# Patient Record
Sex: MALE | Race: ASIAN | NOT HISPANIC OR LATINO | Employment: FULL TIME | URBAN - METROPOLITAN AREA
[De-identification: names, ages, dates, MRNs, and addresses within clinical notes are randomized per-mention and may not be internally consistent; named-entity substitution may affect disease eponyms.]

---

## 2017-01-26 ENCOUNTER — TELEPHONE (OUTPATIENT)
Dept: FAMILY MEDICINE | Facility: CLINIC | Age: 27
End: 2017-01-26

## 2017-01-26 NOTE — TELEPHONE ENCOUNTER
Mother Lucila is calling, groin pain, seen at ED last October, and had phone follow up with Huey. Has symptoms again, can he get another prescription for an antibiotic?  Same area as last time. NKDA. Leaving town tomorrow, and is not able to come in.  Huddled with provider, he is recommending he make an appointment or go to urgent care if scheduling problem. Advised to take an  Anti-inflammatory to help with pain. Mom is having a hard time getting patient to agree to an appointment.   There are openings on schedule for tomorrow if he decides.   Mom will try and get him to urgent care this evening.   Jaymie Herrera, MELVI  Triage Nurse

## 2017-02-21 ENCOUNTER — OFFICE VISIT (OUTPATIENT)
Dept: FAMILY MEDICINE | Facility: CLINIC | Age: 27
End: 2017-02-21
Payer: COMMERCIAL

## 2017-02-21 VITALS
HEIGHT: 68 IN | HEART RATE: 95 BPM | WEIGHT: 292 LBS | TEMPERATURE: 97.9 F | BODY MASS INDEX: 44.25 KG/M2 | OXYGEN SATURATION: 96 % | SYSTOLIC BLOOD PRESSURE: 144 MMHG | DIASTOLIC BLOOD PRESSURE: 94 MMHG

## 2017-02-21 DIAGNOSIS — G47.33 OSA (OBSTRUCTIVE SLEEP APNEA): ICD-10-CM

## 2017-02-21 DIAGNOSIS — S83.206A TEAR OF MENISCUS OF RIGHT KNEE AS CURRENT INJURY, UNSPECIFIED MENISCUS, UNSPECIFIED TEAR TYPE, INITIAL ENCOUNTER: ICD-10-CM

## 2017-02-21 DIAGNOSIS — E66.01 MORBID OBESITY DUE TO EXCESS CALORIES (H): ICD-10-CM

## 2017-02-21 DIAGNOSIS — I10 ESSENTIAL HYPERTENSION WITH GOAL BLOOD PRESSURE LESS THAN 140/90: ICD-10-CM

## 2017-02-21 DIAGNOSIS — S89.91XA RIGHT KNEE INJURY, INITIAL ENCOUNTER: ICD-10-CM

## 2017-02-21 DIAGNOSIS — Z01.818 PREOP GENERAL PHYSICAL EXAM: Primary | ICD-10-CM

## 2017-02-21 LAB — HGB BLD-MCNC: 15.1 G/DL (ref 13.3–17.7)

## 2017-02-21 PROCEDURE — 99214 OFFICE O/P EST MOD 30 MIN: CPT | Performed by: PHYSICIAN ASSISTANT

## 2017-02-21 PROCEDURE — 80048 BASIC METABOLIC PNL TOTAL CA: CPT | Performed by: PHYSICIAN ASSISTANT

## 2017-02-21 PROCEDURE — 85018 HEMOGLOBIN: CPT | Performed by: PHYSICIAN ASSISTANT

## 2017-02-21 PROCEDURE — 36415 COLL VENOUS BLD VENIPUNCTURE: CPT | Performed by: PHYSICIAN ASSISTANT

## 2017-02-21 RX ORDER — LISINOPRIL AND HYDROCHLOROTHIAZIDE 20; 25 MG/1; MG/1
1 TABLET ORAL DAILY
Qty: 90 TABLET | Refills: 0 | Status: SHIPPED | OUTPATIENT
Start: 2017-02-21 | End: 2017-03-09

## 2017-02-21 NOTE — MR AVS SNAPSHOT
After Visit Summary   2/21/2017    Cristiano Casanova    MRN: [id number]           Patient Information     Date Of Birth          1990        Visit Information        Provider Department      2/21/2017 6:00 PM Kiel Georges PA-C [place]        Today's Diagnoses     Preop general physical exam    -  1    Right knee injury, initial encounter        Tear of meniscus of right knee as current injury, unspecified meniscus, unspecified tear type, initial encounter        Essential hypertension with goal blood pressure less than 140/90        MERRICK (obstructive sleep apnea)          Care Instructions      Before Your Surgery      Call your surgeon if there is any change in your health. This includes signs of a cold or flu (such as a sore throat, runny nose, cough, rash or fever).    Do not smoke, drink alcohol or take over the counter medicine (unless your surgeon or primary care doctor tells you to) for the 24 hours before and after surgery.    If you take prescribed drugs: Follow your doctor s orders about which medicines to take and which to stop until after surgery.    Eating and drinking prior to surgery: follow the instructions from your surgeon    Take a shower or bath the night before surgery. Use the soap your surgeon gave you to gently clean your skin. If you do not have soap from your surgeon, use your regular soap. Do not shave or scrub the surgery site.  Wear clean pajamas and have clean sheets on your bed.         Follow-ups after your visit        Your next 10 appointments already scheduled     Mar 01, 2017   Procedure with Abdelrahman Cui MD   [facility] (--)    [address] [facility] [address] [phone]              Who to contact     If you have questions or need follow up information about today's clinic visit or your schedule please contact [facility] directly at [phone].  Normal or non-critical  "lab and imaging results will be communicated to you by [employer], letter or phone within 4 business days after the clinic has received the results. If you do not hear from us within 7 days, please contact the clinic through [employer] or phone. If you have a critical or abnormal lab result, we will notify you by phone as soon as possible.  Submit refill requests through [employer] or call your pharmacy and they will forward the refill request to us. Please allow 3 business days for your refill to be completed.          Additional Information About Your Visit        [employer] Information     [employer] gives you secure access to your electronic health record. If you see a primary care provider, you can also send messages to your care team and make appointments. If you have questions, please call your primary care clinic.  If you do not have a primary care provider, please call [phone] and they will assist you.        Care EveryWhere ID     This is your Care EveryWhere ID. This could be used by other organizations to access your [facility] medical records  [id number]        Your Vitals Were     Pulse Temperature Height Pulse Oximetry BMI (Body Mass Index)       95 97.9  F (36.6  C) (Oral) 5' 8\" (1.727 m) 96% 44.4 kg/m2        Blood Pressure from Last 3 Encounters:   02/21/17 (!) 144/94   12/15/16 134/82   10/15/16 125/69    Weight from Last 3 Encounters:   02/21/17 292 lb (132.5 kg)   12/15/16 284 lb (128.8 kg)   08/16/16 272 lb 11.2 oz (123.7 kg)              We Performed the Following     Basic metabolic panel     Hemoglobin          Today's Medication Changes          These changes are accurate as of: 2/21/17  6:21 PM.  If you have any questions, ask your nurse or doctor.               Start taking these medicines.        Dose/Directions    lisinopril-hydrochlorothiazide 20-25 MG per tablet   Commonly known as:  PRINZIDE/ZESTORETIC   Used for:  Essential hypertension with goal blood pressure less than 140/90, Preop general " physical exam   Replaces:  lisinopril-hydrochlorothiazide 20-12.5 MG per tablet   Started by:  Kiel Georges PA-C        Dose:  1 tablet   Take 1 tablet by mouth daily   Quantity:  90 tablet   Refills:  0         Stop taking these medicines if you haven't already. Please contact your care team if you have questions.     lisinopril-hydrochlorothiazide 20-12.5 MG per tablet   Commonly known as:  PRINZIDE/ZESTORETIC   Replaced by:  lisinopril-hydrochlorothiazide 20-25 MG per tablet   Stopped by:  Kiel Georges PA-C                Where to get your medicines      [facility] [address] [facility] [address] AT [place] [address] & [facility]  [address] [facility] [address]     Phone:  [phone]     lisinopril-hydrochlorothiazide 20-25 MG per tablet                Primary Care Provider Office Phone # Fax #    Kiel Georges PA-C [phone]       [facility] [address] [facility] [address]        Thank you!     Thank you for choosing [facility]  for your care. Our goal is always to provide you with excellent care. Hearing back from our patients is one way we can continue to improve our services. Please take a few minutes to complete the written survey that you may receive in the mail after your visit with us. Thank you!             Your Updated Medication List - Protect others around you: Learn how to safely use, store and throw away your medicines at www.disposemymeds.org.          This list is accurate as of: 2/21/17  6:21 PM.  Always use your most recent med list.                   Brand Name Dispense Instructions for use    lisinopril-hydrochlorothiazide 20-25 MG per tablet    PRINZIDE/ZESTORETIC    90 tablet    Take 1 tablet by mouth daily

## 2017-02-22 LAB
ANION GAP SERPL CALCULATED.3IONS-SCNC: 6 MMOL/L (ref 3–14)
BUN SERPL-MCNC: 11 MG/DL (ref 7–30)
CALCIUM SERPL-MCNC: 9.1 MG/DL (ref 8.5–10.1)
CHLORIDE SERPL-SCNC: 105 MMOL/L (ref 94–109)
CO2 SERPL-SCNC: 29 MMOL/L (ref 20–32)
CREAT SERPL-MCNC: 0.72 MG/DL (ref 0.66–1.25)
GFR SERPL CREATININE-BSD FRML MDRD: ABNORMAL ML/MIN/1.7M2
GLUCOSE SERPL-MCNC: 101 MG/DL (ref 70–99)
POTASSIUM SERPL-SCNC: 4.2 MMOL/L (ref 3.4–5.3)
SODIUM SERPL-SCNC: 140 MMOL/L (ref 133–144)

## 2017-02-22 NOTE — NURSING NOTE
"Chief Complaint   Patient presents with     Pre-Op Exam       Initial /88  Pulse 95  Temp 97.9  F (36.6  C) (Oral)  Ht 5' 8\" (1.727 m)  Wt 292 lb (132.5 kg)  SpO2 96%  BMI 44.4 kg/m2 Estimated body mass index is 44.4 kg/(m^2) as calculated from the following:    Height as of this encounter: 5' 8\" (1.727 m).    Weight as of this encounter: 292 lb (132.5 kg).  Medication Reconciliation: complete   Kd Burks CMA        "

## 2017-02-22 NOTE — PROGRESS NOTES
[facility]  [address] [facility] [address] [phone]  Dept: [phone]    PRE-OP EVALUATION:  Today's date: 2017    Cristiano Casanova (: 1990) presents for pre-operative evaluation assessment as requested by Dr. Cui.  He requires evaluation and anesthesia risk assessment prior to undergoing surgery/procedure for treatment of right knee .  Proposed procedure: Arthroscopy of Right Knee    Date of Surgery/ Procedure: 3/1/17  Time of Surgery/ Procedure: 8:55am  Hospital/Surgical Facility: [facility]    Primary Physician: Kiel Georges  Type of Anesthesia Anticipated: to be determined    Patient has a Health Care Directive or Living Will:  NO    1. NO - Do you have a history of heart attack, stroke, stent, bypass or surgery on an artery in the head, neck, heart or legs?  2. NO - Do you ever have any pain or discomfort in your chest?  3. NO - Do you have a history of  Heart Failure?  4. NO - Are you troubled by shortness of breath when: walking on the level, up a slight hill or at night?  5. NO - Do you currently have a cold, bronchitis or other respiratory infection?  6. NO - Do you have a cough, shortness of breath or wheezing?  7. NO - Do you sometimes get pains in the calves of your legs when you walk?  8. NO - Do you or anyone in your family have previous history of blood clots?  9. NO - Do you or does anyone in your family have a serious bleeding problem such as prolonged bleeding following surgeries or cuts?  10. NO - Have you ever had problems with anemia or been told to take iron pills?  11. NO - Have you had any abnormal blood loss such as black, tarry or bloody stools, or abnormal vaginal bleeding?  12. NO - Have you ever had a blood transfusion?  13. NO - Have you or any of your relatives ever had problems with anesthesia?  14. YES - DO YOU HAVE SLEEP APNEA, EXCESSIVE SNORING OR DAYTIME DROWSINESS? Has a dx of MERRICK, does not have cpap  15. NO -  Do you have any prosthetic heart valves?  16. NO - Do you have prosthetic joints?  17. NO - Is there any chance that you may be pregnant?    Unsure about family history - he was adopted.     HPI:                                                      Brief HPI related to upcoming procedure: Patient was playing soccer and noted increasing pain in the right knee; he had had prior steroid injections and these didn't seem to be helping any longer; more recent MRI showed meniscal tearing      HYPERTENSION - Patient has longstanding history of mod-severe HTN , currently denies any symptoms referable to elevated blood pressure. Specifically denies chest pain, palpitations, dyspnea, orthopnea, PND or peripheral edema. Blood pressure readings have been in normal range or close. Current medication regimen is as listed below. Patient denies any side effects of medication.                                                                                                           SLEEP PROBLEM - Patient has a longstanding history of snoring of moderate severity and a dx of MERRICK. He is not currently using his CPAP                                                                                                                                 MEDICAL HISTORY:                                                      Patient Active Problem List    Diagnosis Date Noted     MERRICK (obstructive sleep apnea) 02/21/2017     Priority: Medium     CARDIOVASCULAR SCREENING; LDL GOAL LESS THAN 160 01/20/2012     Priority: Medium     Hypertension goal BP (blood pressure) < 140/90 09/06/2011     Priority: Medium     Obesity 12/28/2010     Priority: Medium     Pes planus 06/12/2006     Priority: Medium     Problem list name updated by automated process. Provider to review       Plantar fascial fibromatosis 06/12/2006     Priority: Medium     Pain in limb 06/12/2006     Priority: Medium      Past Medical History   Diagnosis Date     Hypertension      Obesity   "    Past Surgical History   Procedure Laterality Date     Arthroscopic reconstruction anterior and posterior cruciate ligament, combined  2009     x2     Eye surgery       x3     Current Outpatient Prescriptions   Medication Sig Dispense Refill     lisinopril-hydrochlorothiazide (PRINZIDE,ZESTORETIC) 20-12.5 MG per tablet Take 1 tablet by mouth daily 90 tablet 1     OTC products: None, except as noted above    Allergies   Allergen Reactions     No Known Drug Allergies       Latex Allergy: NO    Social History   Substance Use Topics     Smoking status: Current Some Day Smoker     Packs/day: 1.00     Types: Cigarettes     Last attempt to quit: 12/21/2011     Smokeless tobacco: Never Used      Comment: now smoking only 1-2/day     Alcohol use 0.0 oz/week     0 Standard drinks or equivalent per week      Comment: 16 drinks/week     History   Drug Use No       REVIEW OF SYSTEMS:                                                    C: NEGATIVE for fever, chills, change in weight  I: NEGATIVE for worrisome rashes, moles or lesions  E: NEGATIVE for vision changes or irritation  E/M: NEGATIVE for ear, mouth and throat problems  R: NEGATIVE for significant cough or SOB  CV: NEGATIVE for chest pain, palpitations or peripheral edema  GI: NEGATIVE for nausea, abdominal pain, heartburn, or change in bowel habits  : NEGATIVE for frequency, dysuria, or hematuria  M: NEGATIVE for significant arthralgias or myalgia  N: NEGATIVE for weakness, dizziness or paresthesias  E: NEGATIVE for temperature intolerance, skin/hair changes  H: NEGATIVE for bleeding problems  P: NEGATIVE for changes in mood or affect    EXAM:                                                    BP (!) 144/94  Pulse 95  Temp 97.9  F (36.6  C) (Oral)  Ht 5' 8\" (1.727 m)  Wt 292 lb (132.5 kg)  SpO2 96%  BMI 44.4 kg/m2    GENERAL APPEARANCE: healthy, alert and no distress     EYES: EOMI, - PERRL     HENT: ear canals and TM's normal and nose and mouth without ulcers or " lesions     NECK: no adenopathy, no asymmetry, masses, or scars and thyroid normal to palpation     RESP: lungs clear to auscultation - no rales, rhonchi or wheezes     CV: regular rates and rhythm, normal S1 S2, no S3 or S4 and no murmur, click or rub -     ABDOMEN:  soft, nontender, no HSM or masses and bowel sounds normal     MS: there is positive meniscal irritation testing along the right knee. DP intact     SKIN: no suspicious lesions or rashes     PSYCH: mentation appears normal. and affect normal/bright    DIAGNOSTICS:                                                    Labs 2/21/17    Last Hemoglobin: 15.1  Last Basic Metabolic Panel:  Lab Results   Component Value Date     02/21/2017      Lab Results   Component Value Date    POTASSIUM 4.2 02/21/2017     Lab Results   Component Value Date    CHLORIDE 105 02/21/2017     Lab Results   Component Value Date    BRINA 9.1 02/21/2017     Lab Results   Component Value Date    CO2 29 02/21/2017     Lab Results   Component Value Date    BUN 11 02/21/2017     Lab Results   Component Value Date    CR 0.72 02/21/2017     Lab Results   Component Value Date     02/21/2017            IMPRESSION:                                                    Reason for surgery/procedure: Right knee athroscopy/clean up  Diagnosis/reason for consult: Pre-op evaluation    The proposed surgical procedure is considered INTERMEDIATE risk.    REVISED CARDIAC RISK INDEX  The patient has the following serious cardiovascular risks for perioperative complications such as (MI, PE, VFib and 3  AV Block):  No serious cardiac risks  INTERPRETATION: 0 risks: Class I (very low risk - 0.4% complication rate)    The patient has the following additional risks for perioperative complications:  No identified additional risks      ICD-10-CM    1. Preop general physical exam Z01.818 lisinopril-hydrochlorothiazide (PRINZIDE/ZESTORETIC) 20-25 MG per tablet     Basic metabolic panel     Hemoglobin    2. Right knee injury, initial encounter S89.91XA    3. Tear of meniscus of right knee as current injury, unspecified meniscus, unspecified tear type, initial encounter S83.206A    4. Essential hypertension with goal blood pressure less than 140/90 I10 lisinopril-hydrochlorothiazide (PRINZIDE/ZESTORETIC) 20-25 MG per tablet     Basic metabolic panel   5. MERRICK (obstructive sleep apnea) G47.33        RECOMMENDATIONS:                                                        Obstructive Sleep Apnea (or suspected sleep apnea)  Patient is clearly advised to use their home CPAP when released from surgery  Hospital staff are advised to monitor for sleep related oxygen desaturations due to dx of MERRICK      ACE Inhibitor or Angiotensin Receptor Blocker (ARB) Use  Ace inhibitor or Angiotensin Receptor Blocker (ARB) and should HOLD this medication for the 24 hours prior to surgery.      --Pt advised to avoid NSAIDS (Motrin, Ibuprofen, Aleve or Naprosyn);  If needed, Tylenol or Acetaminophen are fine to use.  --meds reviewed; may hold lisinopril-hctz on AM of surgery.        --Pain medications, time off from work and FMLA following surgery deferred to surgeon.        APPROVAL GIVEN to proceed with proposed procedure, without further diagnostic evaluation       Signed Electronically by: Kiel Georges PA-C    Copy of this evaluation report is provided to requesting physician.    [facility] Preop Guidelines

## 2017-02-28 NOTE — H&P (VIEW-ONLY)
[facility]  [address] [facility] [address] [phone]  Dept: [phone]    PRE-OP EVALUATION:  Today's date: 2017    Cristiano Casanova (: 1990) presents for pre-operative evaluation assessment as requested by Dr. Cui.  He requires evaluation and anesthesia risk assessment prior to undergoing surgery/procedure for treatment of right knee .  Proposed procedure: Arthroscopy of Right Knee    Date of Surgery/ Procedure: 3/1/17  Time of Surgery/ Procedure: 8:55am  Hospital/Surgical Facility: [facility]    Primary Physician: Kiel Georges  Type of Anesthesia Anticipated: to be determined    Patient has a Health Care Directive or Living Will:  NO    1. NO - Do you have a history of heart attack, stroke, stent, bypass or surgery on an artery in the head, neck, heart or legs?  2. NO - Do you ever have any pain or discomfort in your chest?  3. NO - Do you have a history of  Heart Failure?  4. NO - Are you troubled by shortness of breath when: walking on the level, up a slight hill or at night?  5. NO - Do you currently have a cold, bronchitis or other respiratory infection?  6. NO - Do you have a cough, shortness of breath or wheezing?  7. NO - Do you sometimes get pains in the calves of your legs when you walk?  8. NO - Do you or anyone in your family have previous history of blood clots?  9. NO - Do you or does anyone in your family have a serious bleeding problem such as prolonged bleeding following surgeries or cuts?  10. NO - Have you ever had problems with anemia or been told to take iron pills?  11. NO - Have you had any abnormal blood loss such as black, tarry or bloody stools, or abnormal vaginal bleeding?  12. NO - Have you ever had a blood transfusion?  13. NO - Have you or any of your relatives ever had problems with anesthesia?  14. YES - DO YOU HAVE SLEEP APNEA, EXCESSIVE SNORING OR DAYTIME DROWSINESS? Has a dx of MERRICK, does not have cpap  15. NO -  Do you have any prosthetic heart valves?  16. NO - Do you have prosthetic joints?  17. NO - Is there any chance that you may be pregnant?    Unsure about family history - he was adopted.     HPI:                                                      Brief HPI related to upcoming procedure: Patient was playing soccer and noted increasing pain in the right knee; he had had prior steroid injections and these didn't seem to be helping any longer; more recent MRI showed meniscal tearing      HYPERTENSION - Patient has longstanding history of mod-severe HTN , currently denies any symptoms referable to elevated blood pressure. Specifically denies chest pain, palpitations, dyspnea, orthopnea, PND or peripheral edema. Blood pressure readings have been in normal range or close. Current medication regimen is as listed below. Patient denies any side effects of medication.                                                                                                           SLEEP PROBLEM - Patient has a longstanding history of snoring of moderate severity and a dx of MERRICK. He is not currently using his CPAP                                                                                                                                 MEDICAL HISTORY:                                                      Patient Active Problem List    Diagnosis Date Noted     MERRICK (obstructive sleep apnea) 02/21/2017     Priority: Medium     CARDIOVASCULAR SCREENING; LDL GOAL LESS THAN 160 01/20/2012     Priority: Medium     Hypertension goal BP (blood pressure) < 140/90 09/06/2011     Priority: Medium     Obesity 12/28/2010     Priority: Medium     Pes planus 06/12/2006     Priority: Medium     Problem list name updated by automated process. Provider to review       Plantar fascial fibromatosis 06/12/2006     Priority: Medium     Pain in limb 06/12/2006     Priority: Medium      Past Medical History   Diagnosis Date     Hypertension      Obesity   "    Past Surgical History   Procedure Laterality Date     Arthroscopic reconstruction anterior and posterior cruciate ligament, combined  2009     x2     Eye surgery       x3     Current Outpatient Prescriptions   Medication Sig Dispense Refill     lisinopril-hydrochlorothiazide (PRINZIDE,ZESTORETIC) 20-12.5 MG per tablet Take 1 tablet by mouth daily 90 tablet 1     OTC products: None, except as noted above    Allergies   Allergen Reactions     No Known Drug Allergies       Latex Allergy: NO    Social History   Substance Use Topics     Smoking status: Current Some Day Smoker     Packs/day: 1.00     Types: Cigarettes     Last attempt to quit: 12/21/2011     Smokeless tobacco: Never Used      Comment: now smoking only 1-2/day     Alcohol use 0.0 oz/week     0 Standard drinks or equivalent per week      Comment: 16 drinks/week     History   Drug Use No       REVIEW OF SYSTEMS:                                                    C: NEGATIVE for fever, chills, change in weight  I: NEGATIVE for worrisome rashes, moles or lesions  E: NEGATIVE for vision changes or irritation  E/M: NEGATIVE for ear, mouth and throat problems  R: NEGATIVE for significant cough or SOB  CV: NEGATIVE for chest pain, palpitations or peripheral edema  GI: NEGATIVE for nausea, abdominal pain, heartburn, or change in bowel habits  : NEGATIVE for frequency, dysuria, or hematuria  M: NEGATIVE for significant arthralgias or myalgia  N: NEGATIVE for weakness, dizziness or paresthesias  E: NEGATIVE for temperature intolerance, skin/hair changes  H: NEGATIVE for bleeding problems  P: NEGATIVE for changes in mood or affect    EXAM:                                                    BP (!) 144/94  Pulse 95  Temp 97.9  F (36.6  C) (Oral)  Ht 5' 8\" (1.727 m)  Wt 292 lb (132.5 kg)  SpO2 96%  BMI 44.4 kg/m2    GENERAL APPEARANCE: healthy, alert and no distress     EYES: EOMI, - PERRL     HENT: ear canals and TM's normal and nose and mouth without ulcers or " lesions     NECK: no adenopathy, no asymmetry, masses, or scars and thyroid normal to palpation     RESP: lungs clear to auscultation - no rales, rhonchi or wheezes     CV: regular rates and rhythm, normal S1 S2, no S3 or S4 and no murmur, click or rub -     ABDOMEN:  soft, nontender, no HSM or masses and bowel sounds normal     MS: there is positive meniscal irritation testing along the right knee. DP intact     SKIN: no suspicious lesions or rashes     PSYCH: mentation appears normal. and affect normal/bright    DIAGNOSTICS:                                                    Labs 2/21/17    Last Hemoglobin: 15.1  Last Basic Metabolic Panel:  Lab Results   Component Value Date     02/21/2017      Lab Results   Component Value Date    POTASSIUM 4.2 02/21/2017     Lab Results   Component Value Date    CHLORIDE 105 02/21/2017     Lab Results   Component Value Date    BRINA 9.1 02/21/2017     Lab Results   Component Value Date    CO2 29 02/21/2017     Lab Results   Component Value Date    BUN 11 02/21/2017     Lab Results   Component Value Date    CR 0.72 02/21/2017     Lab Results   Component Value Date     02/21/2017            IMPRESSION:                                                    Reason for surgery/procedure: Right knee athroscopy/clean up  Diagnosis/reason for consult: Pre-op evaluation    The proposed surgical procedure is considered INTERMEDIATE risk.    REVISED CARDIAC RISK INDEX  The patient has the following serious cardiovascular risks for perioperative complications such as (MI, PE, VFib and 3  AV Block):  No serious cardiac risks  INTERPRETATION: 0 risks: Class I (very low risk - 0.4% complication rate)    The patient has the following additional risks for perioperative complications:  No identified additional risks      ICD-10-CM    1. Preop general physical exam Z01.818 lisinopril-hydrochlorothiazide (PRINZIDE/ZESTORETIC) 20-25 MG per tablet     Basic metabolic panel     Hemoglobin    2. Right knee injury, initial encounter S89.91XA    3. Tear of meniscus of right knee as current injury, unspecified meniscus, unspecified tear type, initial encounter S83.206A    4. Essential hypertension with goal blood pressure less than 140/90 I10 lisinopril-hydrochlorothiazide (PRINZIDE/ZESTORETIC) 20-25 MG per tablet     Basic metabolic panel   5. MERRICK (obstructive sleep apnea) G47.33        RECOMMENDATIONS:                                                        Obstructive Sleep Apnea (or suspected sleep apnea)  Patient is clearly advised to use their home CPAP when released from surgery  Hospital staff are advised to monitor for sleep related oxygen desaturations due to dx of MERRICK      ACE Inhibitor or Angiotensin Receptor Blocker (ARB) Use  Ace inhibitor or Angiotensin Receptor Blocker (ARB) and should HOLD this medication for the 24 hours prior to surgery.      --Pt advised to avoid NSAIDS (Motrin, Ibuprofen, Aleve or Naprosyn);  If needed, Tylenol or Acetaminophen are fine to use.  --meds reviewed; may hold lisinopril-hctz on AM of surgery.        --Pain medications, time off from work and FMLA following surgery deferred to surgeon.        APPROVAL GIVEN to proceed with proposed procedure, without further diagnostic evaluation       Signed Electronically by: Kiel Georges PA-C    Copy of this evaluation report is provided to requesting physician.    [facility] Preop Guidelines

## 2017-03-01 ENCOUNTER — HOSPITAL ENCOUNTER (OUTPATIENT)
Facility: CLINIC | Age: 27
Discharge: HOME | End: 2017-03-01
Attending: ORTHOPAEDIC SURGERY | Admitting: ORTHOPAEDIC SURGERY
Payer: COMMERCIAL

## 2017-03-01 ENCOUNTER — ANESTHESIA (OUTPATIENT)
Dept: SURGERY | Facility: CLINIC | Age: 27
End: 2017-03-01
Payer: COMMERCIAL

## 2017-03-01 ENCOUNTER — ANESTHESIA EVENT (OUTPATIENT)
Dept: SURGERY | Facility: CLINIC | Age: 27
End: 2017-03-01
Payer: COMMERCIAL

## 2017-03-01 VITALS
SYSTOLIC BLOOD PRESSURE: 138 MMHG | BODY MASS INDEX: 43.95 KG/M2 | RESPIRATION RATE: 16 BRPM | HEART RATE: 101 BPM | OXYGEN SATURATION: 95 % | DIASTOLIC BLOOD PRESSURE: 78 MMHG | TEMPERATURE: 98 F | WEIGHT: 290 LBS | HEIGHT: 68 IN

## 2017-03-01 DIAGNOSIS — S83.209A MENISCUS TEAR: Primary | ICD-10-CM

## 2017-03-01 LAB — INTERPRETATION ECG - MUSE: NORMAL

## 2017-03-01 PROCEDURE — 37000008 ZZH ANESTHESIA TECHNICAL FEE, 1ST 30 MIN: Performed by: ORTHOPAEDIC SURGERY

## 2017-03-01 PROCEDURE — 25000125 ZZHC RX 250: Performed by: ORTHOPAEDIC SURGERY

## 2017-03-01 PROCEDURE — 71000012 ZZH RECOVERY PHASE 1 LEVEL 1 FIRST HR: Performed by: ORTHOPAEDIC SURGERY

## 2017-03-01 PROCEDURE — 25000566 ZZH SEVOFLURANE, EA 15 MIN: Performed by: ORTHOPAEDIC SURGERY

## 2017-03-01 PROCEDURE — 25000128 H RX IP 250 OP 636: Performed by: NURSE ANESTHETIST, CERTIFIED REGISTERED

## 2017-03-01 PROCEDURE — 37000009 ZZH ANESTHESIA TECHNICAL FEE, EACH ADDTL 15 MIN: Performed by: ORTHOPAEDIC SURGERY

## 2017-03-01 PROCEDURE — 93010 ELECTROCARDIOGRAM REPORT: CPT | Performed by: INTERNAL MEDICINE

## 2017-03-01 PROCEDURE — 25000125 ZZHC RX 250: Performed by: NURSE ANESTHETIST, CERTIFIED REGISTERED

## 2017-03-01 PROCEDURE — 25800025 ZZH RX 258: Performed by: ORTHOPAEDIC SURGERY

## 2017-03-01 PROCEDURE — S0020 INJECTION, BUPIVICAINE HYDRO: HCPCS | Performed by: ORTHOPAEDIC SURGERY

## 2017-03-01 PROCEDURE — 40000306 ZZH STATISTIC PRE PROC ASSESS II: Performed by: ORTHOPAEDIC SURGERY

## 2017-03-01 PROCEDURE — 36000058 ZZH SURGERY LEVEL 3 EA 15 ADDTL MIN: Performed by: ORTHOPAEDIC SURGERY

## 2017-03-01 PROCEDURE — 25000132 ZZH RX MED GY IP 250 OP 250 PS 637: Performed by: PHYSICIAN ASSISTANT

## 2017-03-01 PROCEDURE — 25800025 ZZH RX 258: Performed by: NURSE ANESTHETIST, CERTIFIED REGISTERED

## 2017-03-01 PROCEDURE — 27210794 ZZH OR GENERAL SUPPLY STERILE: Performed by: ORTHOPAEDIC SURGERY

## 2017-03-01 PROCEDURE — 36000056 ZZH SURGERY LEVEL 3 1ST 30 MIN: Performed by: ORTHOPAEDIC SURGERY

## 2017-03-01 PROCEDURE — 25000132 ZZH RX MED GY IP 250 OP 250 PS 637: Performed by: ORTHOPAEDIC SURGERY

## 2017-03-01 PROCEDURE — 71000027 ZZH RECOVERY PHASE 2 EACH 15 MINS: Performed by: ORTHOPAEDIC SURGERY

## 2017-03-01 PROCEDURE — 25000128 H RX IP 250 OP 636: Performed by: PHYSICIAN ASSISTANT

## 2017-03-01 RX ORDER — SODIUM CHLORIDE, SODIUM LACTATE, POTASSIUM CHLORIDE, CALCIUM CHLORIDE 600; 310; 30; 20 MG/100ML; MG/100ML; MG/100ML; MG/100ML
INJECTION, SOLUTION INTRAVENOUS CONTINUOUS
Status: DISCONTINUED | OUTPATIENT
Start: 2017-03-01 | End: 2017-03-01 | Stop reason: HOSPADM

## 2017-03-01 RX ORDER — CEFAZOLIN SODIUM 1 G/3ML
1 INJECTION, POWDER, FOR SOLUTION INTRAMUSCULAR; INTRAVENOUS SEE ADMIN INSTRUCTIONS
Status: DISCONTINUED | OUTPATIENT
Start: 2017-03-01 | End: 2017-03-01 | Stop reason: HOSPADM

## 2017-03-01 RX ORDER — ASPIRIN 325 MG
325 TABLET ORAL 2 TIMES DAILY
Qty: 20 TABLET | Refills: 0 | Status: SHIPPED | OUTPATIENT
Start: 2017-03-01 | End: 2017-03-11

## 2017-03-01 RX ORDER — HYDROCODONE BITARTRATE AND ACETAMINOPHEN 5; 325 MG/1; MG/1
1-2 TABLET ORAL EVERY 4 HOURS PRN
Status: DISCONTINUED | OUTPATIENT
Start: 2017-03-01 | End: 2017-03-01 | Stop reason: HOSPADM

## 2017-03-01 RX ORDER — IBUPROFEN 600 MG/1
600 TABLET, FILM COATED ORAL
Status: DISCONTINUED | OUTPATIENT
Start: 2017-03-01 | End: 2017-03-01 | Stop reason: HOSPADM

## 2017-03-01 RX ORDER — FENTANYL CITRATE 50 UG/ML
INJECTION, SOLUTION INTRAMUSCULAR; INTRAVENOUS PRN
Status: DISCONTINUED | OUTPATIENT
Start: 2017-03-01 | End: 2017-03-01

## 2017-03-01 RX ORDER — CELECOXIB 200 MG/1
400 CAPSULE ORAL ONCE
Status: COMPLETED | OUTPATIENT
Start: 2017-03-01 | End: 2017-03-01

## 2017-03-01 RX ORDER — CEFAZOLIN SODIUM 1 G/50ML
3 SOLUTION INTRAVENOUS
Status: COMPLETED | OUTPATIENT
Start: 2017-03-01 | End: 2017-03-01

## 2017-03-01 RX ORDER — ONDANSETRON 2 MG/ML
4 INJECTION INTRAMUSCULAR; INTRAVENOUS EVERY 30 MIN PRN
Status: DISCONTINUED | OUTPATIENT
Start: 2017-03-01 | End: 2017-03-01 | Stop reason: HOSPADM

## 2017-03-01 RX ORDER — PROPOFOL 10 MG/ML
INJECTION, EMULSION INTRAVENOUS PRN
Status: DISCONTINUED | OUTPATIENT
Start: 2017-03-01 | End: 2017-03-01

## 2017-03-01 RX ORDER — HYDROCODONE BITARTRATE AND ACETAMINOPHEN 5; 325 MG/1; MG/1
1-2 TABLET ORAL
Status: COMPLETED | OUTPATIENT
Start: 2017-03-01 | End: 2017-03-01

## 2017-03-01 RX ORDER — HYDROCODONE BITARTRATE AND ACETAMINOPHEN 5; 325 MG/1; MG/1
1-2 TABLET ORAL EVERY 4 HOURS PRN
Qty: 30 TABLET | Refills: 0 | Status: SHIPPED | OUTPATIENT
Start: 2017-03-01 | End: 2017-03-09

## 2017-03-01 RX ORDER — NALOXONE HYDROCHLORIDE 0.4 MG/ML
.1-.4 INJECTION, SOLUTION INTRAMUSCULAR; INTRAVENOUS; SUBCUTANEOUS
Status: DISCONTINUED | OUTPATIENT
Start: 2017-03-01 | End: 2017-03-01 | Stop reason: HOSPADM

## 2017-03-01 RX ORDER — FENTANYL CITRATE 50 UG/ML
25-50 INJECTION, SOLUTION INTRAMUSCULAR; INTRAVENOUS
Status: DISCONTINUED | OUTPATIENT
Start: 2017-03-01 | End: 2017-03-01 | Stop reason: HOSPADM

## 2017-03-01 RX ORDER — SODIUM CHLORIDE, SODIUM LACTATE, POTASSIUM CHLORIDE, CALCIUM CHLORIDE 600; 310; 30; 20 MG/100ML; MG/100ML; MG/100ML; MG/100ML
INJECTION, SOLUTION INTRAVENOUS CONTINUOUS PRN
Status: DISCONTINUED | OUTPATIENT
Start: 2017-03-01 | End: 2017-03-01

## 2017-03-01 RX ORDER — DEXAMETHASONE SODIUM PHOSPHATE 4 MG/ML
INJECTION, SOLUTION INTRA-ARTICULAR; INTRALESIONAL; INTRAMUSCULAR; INTRAVENOUS; SOFT TISSUE PRN
Status: DISCONTINUED | OUTPATIENT
Start: 2017-03-01 | End: 2017-03-01

## 2017-03-01 RX ORDER — LIDOCAINE 40 MG/G
CREAM TOPICAL
Status: DISCONTINUED | OUTPATIENT
Start: 2017-03-01 | End: 2017-03-01 | Stop reason: HOSPADM

## 2017-03-01 RX ORDER — LIDOCAINE HYDROCHLORIDE 10 MG/ML
INJECTION, SOLUTION INFILTRATION; PERINEURAL PRN
Status: DISCONTINUED | OUTPATIENT
Start: 2017-03-01 | End: 2017-03-01

## 2017-03-01 RX ORDER — ONDANSETRON 2 MG/ML
INJECTION INTRAMUSCULAR; INTRAVENOUS PRN
Status: DISCONTINUED | OUTPATIENT
Start: 2017-03-01 | End: 2017-03-01

## 2017-03-01 RX ORDER — MEPERIDINE HYDROCHLORIDE 25 MG/ML
12.5 INJECTION INTRAMUSCULAR; INTRAVENOUS; SUBCUTANEOUS
Status: DISCONTINUED | OUTPATIENT
Start: 2017-03-01 | End: 2017-03-01 | Stop reason: HOSPADM

## 2017-03-01 RX ORDER — ONDANSETRON 4 MG/1
4 TABLET, ORALLY DISINTEGRATING ORAL EVERY 30 MIN PRN
Status: DISCONTINUED | OUTPATIENT
Start: 2017-03-01 | End: 2017-03-01 | Stop reason: HOSPADM

## 2017-03-01 RX ORDER — ACETAMINOPHEN 325 MG/1
975 TABLET ORAL ONCE
Status: COMPLETED | OUTPATIENT
Start: 2017-03-01 | End: 2017-03-01

## 2017-03-01 RX ORDER — GLYCOPYRROLATE 0.2 MG/ML
INJECTION, SOLUTION INTRAMUSCULAR; INTRAVENOUS PRN
Status: DISCONTINUED | OUTPATIENT
Start: 2017-03-01 | End: 2017-03-01

## 2017-03-01 RX ADMIN — SODIUM CHLORIDE, POTASSIUM CHLORIDE, SODIUM LACTATE AND CALCIUM CHLORIDE: 600; 310; 30; 20 INJECTION, SOLUTION INTRAVENOUS at 06:41

## 2017-03-01 RX ADMIN — HYDROCODONE BITARTRATE AND ACETAMINOPHEN 1 TABLET: 5; 325 TABLET ORAL at 08:51

## 2017-03-01 RX ADMIN — LIDOCAINE HYDROCHLORIDE 50 MG: 10 INJECTION, SOLUTION INFILTRATION; PERINEURAL at 07:32

## 2017-03-01 RX ADMIN — GLYCOPYRROLATE 0.2 MG: 0.2 INJECTION, SOLUTION INTRAMUSCULAR; INTRAVENOUS at 07:32

## 2017-03-01 RX ADMIN — MIDAZOLAM HYDROCHLORIDE 2 MG: 1 INJECTION, SOLUTION INTRAMUSCULAR; INTRAVENOUS at 07:26

## 2017-03-01 RX ADMIN — CELECOXIB 400 MG: 200 CAPSULE ORAL at 05:53

## 2017-03-01 RX ADMIN — HYDROMORPHONE HYDROCHLORIDE 0.5 MG: 1 INJECTION, SOLUTION INTRAMUSCULAR; INTRAVENOUS; SUBCUTANEOUS at 07:53

## 2017-03-01 RX ADMIN — FENTANYL CITRATE 150 MCG: 50 INJECTION, SOLUTION INTRAMUSCULAR; INTRAVENOUS at 07:32

## 2017-03-01 RX ADMIN — PROPOFOL 200 MG: 10 INJECTION, EMULSION INTRAVENOUS at 07:32

## 2017-03-01 RX ADMIN — SODIUM CHLORIDE, POTASSIUM CHLORIDE, SODIUM LACTATE AND CALCIUM CHLORIDE: 600; 310; 30; 20 INJECTION, SOLUTION INTRAVENOUS at 08:04

## 2017-03-01 RX ADMIN — ONDANSETRON 4 MG: 2 INJECTION INTRAMUSCULAR; INTRAVENOUS at 08:07

## 2017-03-01 RX ADMIN — HYDROCODONE BITARTRATE AND ACETAMINOPHEN 1 TABLET: 5; 325 TABLET ORAL at 09:46

## 2017-03-01 RX ADMIN — HYDROMORPHONE HYDROCHLORIDE 0.5 MG: 1 INJECTION, SOLUTION INTRAMUSCULAR; INTRAVENOUS; SUBCUTANEOUS at 07:46

## 2017-03-01 RX ADMIN — DEXAMETHASONE SODIUM PHOSPHATE 8 MG: 4 INJECTION, SOLUTION INTRAMUSCULAR; INTRAVENOUS at 07:32

## 2017-03-01 RX ADMIN — ACETAMINOPHEN 975 MG: 325 TABLET, FILM COATED ORAL at 05:53

## 2017-03-01 RX ADMIN — Medication 3 G: at 07:35

## 2017-03-01 ASSESSMENT — ENCOUNTER SYMPTOMS: DYSRHYTHMIAS: 0

## 2017-03-01 NOTE — DISCHARGE INSTRUCTIONS
KNEE ARTHROSCOPY DISCHARGE INSTRUCTIONS  [facility]THOP[facility]  ISHAN YOUNG, ISHAN ART & OPernell VIDES'LUIS  [phone]    ACTIVITY  KEEP YOUR LEG ELEVATED WITH A PILLOW UNDER YOUR CALF, NOT UNDER THE KNEE.  YOU SHOULD ATTEMPT TO KEEP YOUR KNEE ABOVE THE LEVEL OF YOUR HEART AND YOUR ANKLE ABOVE THE LEVEL OF YOUR KNEE FOR THE FIRST 2-3 DAYS.  THIS IS THE BEST POSITION TO REDUCE SWELLING.  USE YOUR CRUTCHES IF NECESSARY; GENERALLY YOU CAN PLACE AS MUCH WEIGHT ON YOUR LEG AS PAIN AND SWELLING PERMIT.  IF YOU HAVE INCREASING PAIN OR SWELLING, YOU SHOULD NOT BE USING THE LEG AS MUCH.  ONCE YOU WALK WITHOUT PAIN OR A LIMP; THEN CRUTCHES CAN BE GRADUALLY DISCONTINUED IF YOU ARE USING THEM.    DRESSING  YOU MAY REMOVE THE BANDAGE AND GAUZE FROM YOUR KNEE TWO DAYS AFTER SURGERY.  IF YOU HAVE STERI-STRIPS, LEAVE THEM ON AT THIS TIME.  APPLY BAND-AIDS TO THE WOUNDS.  THE BANDAGES YOU REMOVE MAY BE WET TO THE TOUCH.  THIS IS NORMAL AS THE KNEE IS FILLED WITH WATER DURING THE SURGERY AND IT LEAKS OUT 24-36 HOURS AFTER SURGERY.    KEEP YOUR BANDAGES AND WOUNDS DRY.    IIF THE WOUNDS DO GET WET, REMOVE THE BAND-AIDS, PAT DRY AND REAPPLY FRESH BAND-AIDS.  CHANGE YOUR BAND-AIDS DAILY.    YOU MAY SHOWER WITH THE WOUNDS EXPOSED TWO DAYS AFTER SURGERY.  STILL, HOWEVER, THE WOUNDS ARE NOT TO BE SOAKED (NO TUBS, HOT TUBS, JACUZZIS OR SWIMMING POOLS).  ADDITIONALLY, YOU SHOULD STILL AVOID USE OF A SAUNA OR HEATING PAD.  THIS WILL ONLY PROVOKE SWELLING.    ICE PACKS  YOUR E-Z WRAP WILL BE PLACED ON YOUR KNEE IN THE POST ANESTHESIA RECOVERY ROOM AFTER SURGERY.    WHEN YOU GET HOME, KEEP THE CUFF ON FOR THE FIRST 24 HOURS AND KEEP IT COLD.  ALTERNATIVELY, IF YOU HAVE NOT BEEN PROVIDED WITH AN E-Z WRAP, PLACE ICE PACKS ON YOUR KNEE FOR 20-30 MINUTES 4-5 TIMES A DAY.  USE ICE PACKS FOR 4-5 DAYS.    PAIN CONTROL  TAKE THE PAIN MEDICATION AND/OR ANTI-INFLAMMATORY MEDICATION AS PRESCRIBED.  DON'T LET YOUR PAIN BECOME SEVERE.    OFFICE  RETURN  PLEASE CALL THE OFFICE IN THE FIRST DAY OR TWO AFTER SURGERY TO   SCHEDULE A POST-OPERATIVE VISIT.  YOUR APPOINTMENT SHOULD BE 7-10 DAYS AFTER SURGERY.    IF AT ANY TIME THERE ARE SIGNS OF INFECTION:  INCREASED SWELLING, REDNESS, DRAINAGE FROM THE INCISIONS, WARMTH, FEVER, CHILLS OR SEVERE PAIN UNRELIEVED BY PRESCRIPTION MEDICATIONS OR IF YOU HAVE ANY QUESTIONS OR CONCERNS, CONTACT US AT THE OFFICE: [phone].  PLEASE USE THIS NUMBER FOR EMERGENCY CALLS AND DO NOT CALL THE HOSPITAL/SURGERY CENTER.  THERE IS AN ANSWERING SERVICE AVAILABLE TO ASSIST YOU AFTER HOURS.      GENERAL ANESTHESIA OR SEDATION ADULT DISCHARGE INSTRUCTIONS   SPECIAL PRECAUTIONS FOR 24 HOURS AFTER SURGERY    IT IS NOT UNUSUAL TO FEEL LIGHT-HEADED OR FAINT, UP TO 24 HOURS AFTER SURGERY OR WHILE TAKING PAIN MEDICATION.  IF YOU HAVE THESE SYMPTOMS; SIT FOR A FEW MINUTES BEFORE STANDING AND HAVE SOMEONE ASSIST YOU WHEN YOU GET UP TO WALK OR USE THE BATHROOM.    YOU SHOULD REST AND RELAX FOR THE NEXT 24 HOURS AND YOU MUST MAKE ARRANGEMENTS TO HAVE SOMEONE STAY WITH YOU FOR AT LEAST 24 HOURS AFTER YOUR DISCHARGE.  AVOID HAZARDOUS AND STRENUOUS ACTIVITIES.  DO NOT MAKE IMPORTANT DECISIONS FOR 24 HOURS.    DO NOT DRIVE ANY VEHICLE OR OPERATE MECHANICAL EQUIPMENT FOR 24 HOURS FOLLOWING THE END OF YOUR SURGERY.  EVEN THOUGH YOU MAY FEEL NORMAL, YOUR REACTIONS MAY BE AFFECTED BY THE MEDICATION YOU HAVE RECEIVED.    DO NOT DRINK ALCOHOLIC BEVERAGES FOR 24 HOURS FOLLOWING YOUR SURGERY.    DRINK CLEAR LIQUIDS (APPLE JUICE, GINGER ALE, 7-UP, BROTH, ETC.).  PROGRESS TO YOUR REGULAR DIET AS YOU FEEL ABLE.    YOU MAY HAVE A DRY MOUTH, A SORE THROAT, MUSCLES ACHES OR TROUBLE SLEEPING.  THESE SHOULD GO AWAY AFTER 24 HOURS.    CALL YOUR DOCTOR FOR ANY OF THE FOLLOWING:  SIGNS OF INFECTION (FEVER, GROWING TENDERNESS AT THE SURGERY SITE, A LARGE AMOUNT OF DRAINAGE OR BLEEDING, SEVERE PAIN, FOUL-SMELLING DRAINAGE, REDNESS OR SWELLING.    IT HAS BEEN OVER 8  TO 10 HOURS SINCE SURGERY AND YOU ARE STILL NOT ABLE TO URINATE (PASS WATER).     You received 1 Norco at 8:50 AM

## 2017-03-01 NOTE — ANESTHESIA POSTPROCEDURE EVALUATION
Patient: Cristiano Casanova    Procedure(s):  Right knee medial lateral meniscal debridement and patellar chondroplasty     - Wound Class: I-Clean    Diagnosis:Right knee meniscus tear  Diagnosis Additional Information: Meniscus Tear    Anesthesia Type:  General, LMA    Note:  Anesthesia Post Evaluation    Patient location during evaluation: Phase 2  Patient participation: Able to fully participate in evaluation  Level of consciousness: awake  Pain management: adequate  Airway patency: patent  Cardiovascular status: acceptable  Respiratory status: acceptable  Hydration status: euvolemic  PONV: controlled     Anesthetic complications: None          Last vitals:  Vitals:    03/01/17 0930 03/01/17 0946 03/01/17 1008   BP: 140/73  138/78   Pulse:      Resp: 16 16 16   Temp:      SpO2: 96% 96% 95%         Electronically Signed By: Jin Morgan MD  March 1, 2017  10:45 AM

## 2017-03-01 NOTE — IP AVS SNAPSHOT
[facility] PreOP/PostOP    [address]    [facility] [address]    Phone:  [phone]    Fax:  [phone]                                       After Visit Summary   3/1/2017    Cristiano Casanova    MRN: [id number]           After Visit Summary Signature Page     I have received my discharge instructions, and my questions have been answered. I have discussed any challenges I see with this plan with the nurse or doctor.    ..........................................................................................................................................  Patient/Patient Representative Signature      ..........................................................................................................................................  Patient Representative Print Name and Relationship to Patient    ..................................................               ................................................  Date                                            Time    ..........................................................................................................................................  Reviewed by Signature/Title    ...................................................              ..............................................  Date                                                            Time

## 2017-03-01 NOTE — ANESTHESIA CARE TRANSFER NOTE
Patient: Cristiano Casanova    Procedure(s):  Right knee medial lateral meniscal debridement and patellar chondroplasty     - Wound Class: I-Clean    Diagnosis: Right knee meniscus tear  Diagnosis Additional Information: No value filed.    Anesthesia Type:   General, LMA     Note:  Airway :LMA  Patient transferred to:PACU  Comments: Pt tx to PACU, LMA in place, spon respVSS, pt comfortable. Report given to  PACU RN.      Vitals: (Last set prior to Anesthesia Care Transfer)    CRNA VITALS  3/1/2017 0746 - 3/1/2017 0821      3/1/2017             NIBP: (!)  90/35    NIBP Mean: 44                Electronically Signed By: MOMO Goss CRNA  March 1, 2017  8:21 AM

## 2017-03-01 NOTE — OP NOTE
DATE OF SERVICE:  3/1/2017.      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      POSTOPERATIVE DIAGNOSIS:  Same with ACL deficiency.      PROCEDURE:  Right knee diagnostic arthroscopy, medial meniscal and lateral meniscal debridement, patellar chondroplasty.      SURGEON:  Abdelrahman Cui MD.      FIRST ASSISTANT:  Maria Elena Cazares PA-C.      INDICATIONS FOR SURGERY:  Mr. Cristiano Casanova is a very pleasant 26-year-old gentleman who has had 2 ACL reconstructions of his right knee.  He has had increasing pain lately with mechanical symptoms and we discussed treatment options.  He has really failed conservative management with oral analgesics, activity modifications and injections.  He understands he does have some underlying degenerative changes in the knee and that these would likely be minimally addressed at all during the arthroscopy.   He understands the risks, benefits and alternatives of surgery and wishes to proceed with surgery.      NARRATIVE EVENTS:  After thorough preoperative evaluation, proper identification of patient's extremity to be operated, Mr. Casanova was taken to the operating room where he underwent a general anesthetic, placed supine on the operating table and his right leg was prepped and draped in the usual sterile manner.  After appropriate surgical pause to confirm the patient's extremity to be operated on and 30 minutes after the patient received 2 grams of Ancef, his right knee was exsanguinated and tourniquet was raised to 300 mmHg on the right upper thigh.  We approached his right knee through a median infra-parapatellar portal, scope was inserted and a lateral infra-parapatellar portal was then made under direct vision.  We performed a diagnostic arthroscopy, looked at the intercondylar notch, the ACL was probed; here it was deficient at its ACL footprint into the tibia with stranding and some tearing and it was fairly mobile.  I do not  think it is at all competent at this period of time.  Given the marked degenerative changes we elected not to proceed with any further treatment of the ACL.  We looked at the PCL which was well preserved.  We looked at the patellofemoral articulation, this was well preserved on the femoral side with only some early grade I changes and softening but on particularly in the medial facet of the patella he had significant degenerative changes.  We performed a chondroplasty smoothing the loose and frayed cartilage fragments throughout.  We looked at the suprapatellar pouch, these showed no evidence of synovitis or loose body in the lateral compartment, the lateral compartment showed fairly well preserved cartilage surfaces with some chondral softening, particularly on the posterior aspect of his tibial plateau and then he had some degenerative tearing of the body and extended into the posterior horn of the lateral meniscus.  This was debrided using the shaver back to smooth and stable margins.  We looked at the medial compartment; here he had postoperative changes of his previous medial meniscal debridement, he had some tearing of the body of the medial meniscus.  This was debrided using jean marie and biters back to a smooth and stable margin.  We looked then at the medial compartment as far as the cartilage surfaces goes he has some more significant degenerative changes both on the distal aspect of the medial femoral condyle and on the posterior aspect of the tibial plateau, these were some grade II and grade III and early grade IV changes.  We then removed the scopes from the knee.  The incisions were closed with 3-0 nylon sutures and the knee was injected with bupivacaine.  He was placed in a well-padded postoperative dressing and taken to the recovery room in stable condition.  He tolerated the procedure without difficulty.         YULIANA ART MD             D: 03/01/2017 08:52   T: 03/01/2017 17:05   MT:  EM#[id number]      Name:     LISA GANT   MRN:      [id number]        Account:        [id number]   :      1990           Procedure Date: 2017      Document: [id number]

## 2017-03-01 NOTE — ANESTHESIA PREPROCEDURE EVALUATION
Anesthesia Evaluation     .        ROS/MED HX    ENT/Pulmonary:     (+)sleep apnea, , . .    Neurologic:      (-) TIA, Other neuro hx, Delerium and Dementia   Cardiovascular:     (+) Dyslipidemia, hypertension----. : . CHF . . :. .      (-) CAD, arrhythmias and valvular problems/murmurs   METS/Exercise Tolerance:     Hematologic:        (-) anemia   Musculoskeletal:   (+) arthritis, , , -       GI/Hepatic:        (-) GERD, hiatal hernia and hepatitis   Renal/Genitourinary:      (-) renal disease   Endo:     (+) Obesity, .   (-) Type I DM, Type II DM and thyroid disease   Psychiatric:        (-) psychiatric history   Infectious Disease:  - neg infectious disease ROS       Malignancy:         Other:    - neg other ROS           Physical Exam      Airway   Mallampati: II  TM distance: >3 FB  Neck ROM: full    Dental     Cardiovascular   Rhythm and rate: regular and normal  (-) no murmur    Pulmonary    breath sounds clear to auscultation    Other findings: Lab Test        02/21/17 09/06/11 02/24/09                       1822          1411                            WBC           --          6.0          7.0           HGB          15.1         14.1         15.4          MCV           --          81           84            PLT           --          205          206            Lab Test        02/21/17     08/16/16     08/04/15                       1822          1556          1437          NA           140          140          141           POTASSIUM    4.2          3.6          3.7           CHLORIDE     105          108          105           CO2          29           28           27            BUN          11           12           10            CR           0.72         0.76         0.72          ANIONGAP     6            4            9             BRINA          9.1          8.8          9.0           GLC          101*         89           81                          Anesthesia Plan      History & Physical  Review  History and physical reviewed and following examination; no interval change.    ASA Status:  3 .    NPO Status:  > 8 hours    Plan for General and LMA with Propofol induction.   PONV prophylaxis:  Ondansetron (or other 5HT-3) and Dexamethasone or Solumedrol       Postoperative Care  Postoperative pain management:  IV analgesics and Oral pain medications.      Consents  Anesthetic plan, risks, benefits and alternatives discussed with:  Patient..                          .

## 2017-03-01 NOTE — IP AVS SNAPSHOT
MRN:[id number]                      After Visit Summary   3/1/2017    Cristiano Casanova    MRN: [id number]           Thank you!     Thank you for choosing [facility] for your care. Our goal is always to provide you with excellent care. Hearing back from our patients is one way we can continue to improve our services. Please take a few minutes to complete the written survey that you may receive in the mail after you visit. If you would like to speak to someone directly about your visit please contact Patient Relations at [phone]. Thank you!          Patient Information     Date Of Birth          1990        About your hospital stay     You were admitted on:  March 1, 2017 You last received care in the:  [facility] PreOP/PostOP    You were discharged on:  March 1, 2017       Who to Call     For medical emergencies, please call 911.  For non-urgent questions about your medical care, please call your primary care provider or clinic, [phone]  For questions related to your surgery, please call your surgery clinic        Attending Provider     Provider Abdelrahman Mott MD Orthopaedic Surgery       Primary Care Provider Office Phone # Fax #    Kiel Georges PA-C [phone]       [facility] [address] [facility] [address]        After Care Instructions     Discharge Instructions       Review outpatient procedure discharge instructions with patient as directed by Provider            Remove dressing - at 48 hours           Return to clinic       Return to clinic in 2 weeks            Weight bearing - As tolerated                 Further instructions from your care team       KNEE ARTHROSCOPY DISCHARGE INSTRUCTIONS  [facility] ORTHOPEDICS  ISHAN YOUNG J. NEMANICH & OPernell BENNETT  [phone]    ACTIVITY  KEEP YOUR LEG ELEVATED WITH A PILLOW UNDER YOUR CALF, NOT UNDER THE KNEE.  YOU SHOULD ATTEMPT TO  KEEP YOUR KNEE ABOVE THE LEVEL OF YOUR HEART AND YOUR ANKLE ABOVE THE LEVEL OF YOUR KNEE FOR THE FIRST 2-3 DAYS.  THIS IS THE BEST POSITION TO REDUCE SWELLING.  USE YOUR CRUTCHES IF NECESSARY; GENERALLY YOU CAN PLACE AS MUCH WEIGHT ON YOUR LEG AS PAIN AND SWELLING PERMIT.  IF YOU HAVE INCREASING PAIN OR SWELLING, YOU SHOULD NOT BE USING THE LEG AS MUCH.  ONCE YOU WALK WITHOUT PAIN OR A LIMP; THEN CRUTCHES CAN BE GRADUALLY DISCONTINUED IF YOU ARE USING THEM.    DRESSING  YOU MAY REMOVE THE BANDAGE AND GAUZE FROM YOUR KNEE TWO DAYS AFTER SURGERY.  IF YOU HAVE STERI-STRIPS, LEAVE THEM ON AT THIS TIME.  APPLY BAND-AIDS TO THE WOUNDS.  THE BANDAGES YOU REMOVE MAY BE WET TO THE TOUCH.  THIS IS NORMAL AS THE KNEE IS FILLED WITH WATER DURING THE SURGERY AND IT LEAKS OUT 24-36 HOURS AFTER SURGERY.    KEEP YOUR BANDAGES AND WOUNDS DRY.    IIF THE WOUNDS DO GET WET, REMOVE THE BAND-AIDS, PAT DRY AND REAPPLY FRESH BAND-AIDS.  CHANGE YOUR BAND-AIDS DAILY.    YOU MAY SHOWER WITH THE WOUNDS EXPOSED TWO DAYS AFTER SURGERY.  STILL, HOWEVER, THE WOUNDS ARE NOT TO BE SOAKED (NO TUBS, HOT TUBS, JACUZZIS OR SWIMMING POOLS).  ADDITIONALLY, YOU SHOULD STILL AVOID USE OF A SAUNA OR HEATING PAD.  THIS WILL ONLY PROVOKE SWELLING.    ICE PACKS  YOUR E-Z WRAP WILL BE PLACED ON YOUR KNEE IN THE POST ANESTHESIA RECOVERY ROOM AFTER SURGERY.    WHEN YOU GET HOME, KEEP THE CUFF ON FOR THE FIRST 24 HOURS AND KEEP IT COLD.  ALTERNATIVELY, IF YOU HAVE NOT BEEN PROVIDED WITH AN E-Z WRAP, PLACE ICE PACKS ON YOUR KNEE FOR 20-30 MINUTES 4-5 TIMES A DAY.  USE ICE PACKS FOR 4-5 DAYS.    PAIN CONTROL  TAKE THE PAIN MEDICATION AND/OR ANTI-INFLAMMATORY MEDICATION AS PRESCRIBED.  DON'T LET YOUR PAIN BECOME SEVERE.    OFFICE RETURN  PLEASE CALL THE OFFICE IN THE FIRST DAY OR TWO AFTER SURGERY TO   SCHEDULE A POST-OPERATIVE VISIT.  YOUR APPOINTMENT SHOULD BE 7-10 DAYS AFTER SURGERY.    IF AT ANY TIME THERE ARE SIGNS OF INFECTION:  INCREASED SWELLING, REDNESS,  DRAINAGE FROM THE INCISIONS, WARMTH, FEVER, CHILLS OR SEVERE PAIN UNRELIEVED BY PRESCRIPTION MEDICATIONS OR IF YOU HAVE ANY QUESTIONS OR CONCERNS, CONTACT US AT THE OFFICE: [phone].  PLEASE USE THIS NUMBER FOR EMERGENCY CALLS AND DO NOT CALL THE HOSPITAL/SURGERY CENTER.  THERE IS AN ANSWERING SERVICE AVAILABLE TO ASSIST YOU AFTER HOURS.      GENERAL ANESTHESIA OR SEDATION ADULT DISCHARGE INSTRUCTIONS   SPECIAL PRECAUTIONS FOR 24 HOURS AFTER SURGERY    IT IS NOT UNUSUAL TO FEEL LIGHT-HEADED OR FAINT, UP TO 24 HOURS AFTER SURGERY OR WHILE TAKING PAIN MEDICATION.  IF YOU HAVE THESE SYMPTOMS; SIT FOR A FEW MINUTES BEFORE STANDING AND HAVE SOMEONE ASSIST YOU WHEN YOU GET UP TO WALK OR USE THE BATHROOM.    YOU SHOULD REST AND RELAX FOR THE NEXT 24 HOURS AND YOU MUST MAKE ARRANGEMENTS TO HAVE SOMEONE STAY WITH YOU FOR AT LEAST 24 HOURS AFTER YOUR DISCHARGE.  AVOID HAZARDOUS AND STRENUOUS ACTIVITIES.  DO NOT MAKE IMPORTANT DECISIONS FOR 24 HOURS.    DO NOT DRIVE ANY VEHICLE OR OPERATE MECHANICAL EQUIPMENT FOR 24 HOURS FOLLOWING THE END OF YOUR SURGERY.  EVEN THOUGH YOU MAY FEEL NORMAL, YOUR REACTIONS MAY BE AFFECTED BY THE MEDICATION YOU HAVE RECEIVED.    DO NOT DRINK ALCOHOLIC BEVERAGES FOR 24 HOURS FOLLOWING YOUR SURGERY.    DRINK CLEAR LIQUIDS (APPLE JUICE, GINGER ALE, 7-UP, BROTH, ETC.).  PROGRESS TO YOUR REGULAR DIET AS YOU FEEL ABLE.    YOU MAY HAVE A DRY MOUTH, A SORE THROAT, MUSCLES ACHES OR TROUBLE SLEEPING.  THESE SHOULD GO AWAY AFTER 24 HOURS.    CALL YOUR DOCTOR FOR ANY OF THE FOLLOWING:  SIGNS OF INFECTION (FEVER, GROWING TENDERNESS AT THE SURGERY SITE, A LARGE AMOUNT OF DRAINAGE OR BLEEDING, SEVERE PAIN, FOUL-SMELLING DRAINAGE, REDNESS OR SWELLING.    IT HAS BEEN OVER 8 TO 10 HOURS SINCE SURGERY AND YOU ARE STILL NOT ABLE TO URINATE (PASS WATER).     You received 1 Norco at 8:50 AM    Pending Results     No orders found from 2/27/2017 to 3/2/2017.            Admission Information     Date & Time Provider  "Department Dept. Phone    3/1/2017 Abdelrahman Cui MD [facility] PreOP/PostOP [phone]      Your Vitals Were     Blood Pressure Pulse Temperature Respirations Height Weight    155/58 101 98  F (36.7  C) 16 1.727 m (5' 8\") 131.5 kg (290 lb)    Pulse Oximetry BMI (Body Mass Index)                99% 44.09 kg/m2          [employer] Information     [employer] gives you secure access to your electronic health record. If you see a primary care provider, you can also send messages to your care team and make appointments. If you have questions, please call your primary care clinic.  If you do not have a primary care provider, please call [phone] and they will assist you.        Care EveryWhere ID     This is your Care EveryWhere ID. This could be used by other organizations to access your [place] medical records  [id number]           Review of your medicines      UNREVIEWED medicines. Ask your doctor about these medicines        Dose / Directions    lisinopril-hydrochlorothiazide 20-25 MG per tablet   Commonly known as:  PRINZIDE/ZESTORETIC   Used for:  Essential hypertension with goal blood pressure less than 140/90, Preop general physical exam        Dose:  1 tablet   Take 1 tablet by mouth daily   Quantity:  90 tablet   Refills:  0         START taking        Dose / Directions    aspirin 325 MG tablet   Used for:  Meniscus tear        Dose:  325 mg   Take 1 tablet (325 mg) by mouth 2 times daily for 10 days   Quantity:  20 tablet   Refills:  0       HYDROcodone-acetaminophen 5-325 MG per tablet   Commonly known as:  NORCO   Used for:  Meniscus tear        Dose:  1-2 tablet   Take 1-2 tablets by mouth every 4 hours as needed for other (Moderate to Severe Pain)   Quantity:  30 tablet   Refills:  0            Where to get your medicines      Some of these will need a paper prescription and others can be bought over the counter. Ask your nurse if you have questions.     Bring a paper prescription for " each of these medications     aspirin 325 MG tablet    HYDROcodone-acetaminophen 5-325 MG per tablet                Protect others around you: Learn how to safely use, store and throw away your medicines at www.disposemymeds.org.             Medication List: This is a list of all your medications and when to take them. Check marks below indicate your daily home schedule. Keep this list as a reference.      Medications           Morning Afternoon Evening Bedtime As Needed    aspirin 325 MG tablet   Take 1 tablet (325 mg) by mouth 2 times daily for 10 days                                HYDROcodone-acetaminophen 5-325 MG per tablet   Commonly known as:  NORCO   Take 1-2 tablets by mouth every 4 hours as needed for other (Moderate to Severe Pain)   Last time this was given:  1 tablet on 3/1/2017  8:51 AM                                lisinopril-hydrochlorothiazide 20-25 MG per tablet   Commonly known as:  PRINZIDE/ZESTORETIC   Take 1 tablet by mouth daily

## 2017-03-09 ENCOUNTER — OFFICE VISIT (OUTPATIENT)
Dept: FAMILY MEDICINE | Facility: CLINIC | Age: 27
End: 2017-03-09
Payer: COMMERCIAL

## 2017-03-09 VITALS
BODY MASS INDEX: 42.72 KG/M2 | RESPIRATION RATE: 20 BRPM | HEIGHT: 68 IN | WEIGHT: 281.9 LBS | TEMPERATURE: 98.5 F | HEART RATE: 80 BPM | SYSTOLIC BLOOD PRESSURE: 140 MMHG | DIASTOLIC BLOOD PRESSURE: 100 MMHG

## 2017-03-09 DIAGNOSIS — R10.30 GROIN PAIN: Primary | ICD-10-CM

## 2017-03-09 DIAGNOSIS — I10 ESSENTIAL HYPERTENSION WITH GOAL BLOOD PRESSURE LESS THAN 140/90: ICD-10-CM

## 2017-03-09 DIAGNOSIS — N50.812 TESTICULAR PAIN, LEFT: Primary | ICD-10-CM

## 2017-03-09 LAB
ALBUMIN UR-MCNC: NEGATIVE MG/DL
APPEARANCE UR: CLEAR
BILIRUB UR QL STRIP: NEGATIVE
COLOR UR AUTO: YELLOW
GLUCOSE UR STRIP-MCNC: NEGATIVE MG/DL
HGB UR QL STRIP: NEGATIVE
KETONES UR STRIP-MCNC: NEGATIVE MG/DL
LEUKOCYTE ESTERASE UR QL STRIP: NEGATIVE
NITRATE UR QL: NEGATIVE
PH UR STRIP: 5 PH (ref 5–7)
SP GR UR STRIP: 1.02 (ref 1–1.03)
URN SPEC COLLECT METH UR: NORMAL
UROBILINOGEN UR STRIP-ACNC: 0.2 EU/DL (ref 0.2–1)

## 2017-03-09 PROCEDURE — 81003 URINALYSIS AUTO W/O SCOPE: CPT | Performed by: PHYSICIAN ASSISTANT

## 2017-03-09 PROCEDURE — 99213 OFFICE O/P EST LOW 20 MIN: CPT | Performed by: PHYSICIAN ASSISTANT

## 2017-03-09 RX ORDER — LISINOPRIL AND HYDROCHLOROTHIAZIDE 20; 25 MG/1; MG/1
2 TABLET ORAL DAILY
Qty: 90 TABLET | Refills: 0 | COMMUNITY
Start: 2017-03-09 | End: 2017-12-08

## 2017-03-09 NOTE — PROGRESS NOTES
"  SUBJECTIVE:                                                    Cristiano Casanova is a 26 year old male who presents to clinic today for the following health issues:      Groin      Duration: 2 days    Description (location/character/radiation): Lt testicle pain    Intensity:  Severe. Feels if though testicle are being squeezed    Accompanying signs and symptoms: hurts to walk and lying down    History (similar episodes/previous evaluation): treated in past with abx for same Sx    Precipitating or alleviating factors: None    Therapies tried and outcome: warm shower is effective     -Patient presents with new onset testicular pain x 2 days  -He has had this as severe once previously, U/S was with small hydrocele otherwise negative  -this improved with a course of abx (UA and sti tseting negative)  -he has had recurrent similar symptoms on and off since  -\"feels like someone is squeezing the testicle\" and when he lays on his stomach it feels \"squeezed and actually ripped away\"  -there is no stomach pain; no vomiting  -there is no painful or change in BM  -no change to urination  -denies any injury hx  -there has been no sexual activity at all  -no fevers or chills  -if immobile, pain can be quite subdued, increased with walking    Problem list and histories reviewed & adjusted, as indicated.  Additional history: as documented    Patient Active Problem List   Diagnosis     Pes planus     Plantar fascial fibromatosis     Pain in limb     Obesity     Hypertension goal BP (blood pressure) < 140/90     CARDIOVASCULAR SCREENING; LDL GOAL LESS THAN 160     MERRICK (obstructive sleep apnea)     Past Surgical History   Procedure Laterality Date     Arthroscopic reconstruction anterior and posterior cruciate ligament, combined  2009     x2     Eye surgery       x3     Arthroscopy knee Right 3/1/2017     Procedure: ARTHROSCOPY KNEE;  Surgeon: Abdelrahman Cui MD;  Location:  OR       Social History   Substance Use Topics     " "Smoking status: Current Some Day Smoker     Packs/day: 1.00     Types: Cigarettes     Last attempt to quit: 12/21/2011     Smokeless tobacco: Never Used      Comment: now smoking only 1-2/day     Alcohol use 0.0 oz/week     0 Standard drinks or equivalent per week      Comment: 16 drinks/week     Family History   Problem Relation Age of Onset     Adopted: Yes     DIABETES No family hx of      Hypertension No family hx of      Hyperlipidemia No family hx of            Reviewed and updated as needed this visit by clinical staff  Tobacco  Allergies  Med Hx  Surg Hx  Fam Hx  Soc Hx      Reviewed and updated as needed this visit by Provider         ROS:  Constitutional, HEENT, cardiovascular, pulmonary, gi and gu systems are negative, except as otherwise noted.    OBJECTIVE:                                                    BP (!) 140/100 (BP Location: Right arm, Patient Position: Chair, Cuff Size: Adult Large)  Pulse 80  Temp 98.5  F (36.9  C) (Oral)  Resp 20  Ht 5' 8.25\" (1.734 m)  Wt 281 lb 14.4 oz (127.9 kg)  BMI 42.55 kg/m2  Body mass index is 42.55 kg/(m^2).  GENERAL: healthy, alert and no distress  ABDOMEN: obese, soft, nontender, no hepatosplenomegaly, no masses and bowel sounds normal   (male): right testicle non tender; left testicle mildly elevated into surrounding scrotal tissue, not overly tender, no warmth or erythema, no nodularity, no evidence for hernia    Diagnostic Test Results:  Results for orders placed or performed in visit on 03/09/17 (from the past 24 hour(s))   *UA reflex to Microscopic and Culture ([facility] and [facility] (except [place] and [place])   Result Value Ref Range    Color Urine Yellow     Appearance Urine Clear     Glucose Urine Negative NEG mg/dL    Bilirubin Urine Negative NEG    Ketones Urine Negative NEG mg/dL    Specific Gravity Urine 1.025 1.003 - 1.035    Blood Urine Negative NEG    pH Urine 5.0 5.0 - 7.0 pH    Protein Albumin Urine Negative NEG mg/dL " "   Urobilinogen Urine 0.2 0.2 - 1.0 EU/dL    Nitrite Urine Negative NEG    Leukocyte Esterase Urine Negative NEG    Source Midstream Urine         ASSESSMENT/PLAN:                                                    1. Testicular pain, left  Continued irritation off and on over the past few months. UA is negative. He is not sexually active. He notes that on occasion he notes his left testicle \"will be higher\" though not always. It is riding slightly high today but I have lower suspicion for torsion today given the chronicity of his symptoms and hx of this off and on over the past few weeks. Not an obvious epididymal swelling. No evidence for gross hydrocele. Supportive cares encouraged today (nsaids, ice, elevation) but would like him to get in with urology soon given the recurrence.   - *UA reflex to Microscopic and Culture ([facility], [place] and [facility] (except [place] and [place])  - UROLOGY ADULT REFERRAL    2. Essential hypertension with goal blood pressure less than 140/90  Continues with poor control Will begin lisinopril-hctz 40-50. Follow up for recheck at pharmacy within one week.   - lisinopril-hydrochlorothiazide (PRINZIDE/ZESTORETIC) 20-25 MG per tablet; Take 2 tablets by mouth daily  Dispense: 90 tablet; Refill: 0    Kiel Georges PA-C  [facility] [place]    "

## 2017-03-09 NOTE — MR AVS SNAPSHOT
After Visit Summary   3/9/2017    Cristiano Casanova    MRN: [id number]           Patient Information     Date Of Birth          1990        Visit Information        Provider Department      3/9/2017 10:20 AM Kiel Georges PA-C [facility]        Today's Diagnoses     Testicular pain, left    -  1    Essential hypertension with goal blood pressure less than 140/90        Preop general physical exam           Follow-ups after your visit        Additional Services     UROLOGY ADULT REFERRAL       Your provider has referred you to: [facility]LL[facility]: [facility] [place] [phone]   [email or web address]    Please be aware that coverage of these services is subject to the terms and limitations of your health insurance plan.  Call member services at your health plan with any benefit or coverage questions.      Please bring the following with you to your appointment:    (1) Any X-Rays, CTs or MRIs which have been performed.  Contact the facility where they were done to arrange for  prior to your scheduled appointment.    (2) List of current medications  (3) This referral request   (4) Any documents/labs given to you for this referral                  Who to contact     If you have questions or need follow up information about today's clinic visit or your schedule please contact [facility] directly at [phone].  Normal or non-critical lab and imaging results will be communicated to you by MyChart, letter or phone within 4 business days after the clinic has received the results. If you do not hear from us within 7 days, please contact the clinic through MyChart or phone. If you have a critical or abnormal lab result, we will notify you by phone as soon as possible.  Submit refill requests through [employer] or call your pharmacy and they will forward the refill request to us. Please allow 3 business days for your refill to be completed.     "      Additional Information About Your Visit        MyChart Information     [employer] gives you secure access to your electronic health record. If you see a primary care provider, you can also send messages to your care team and make appointments. If you have questions, please call your primary care clinic.  If you do not have a primary care provider, please call [phone] and they will assist you.        Care EveryWhere ID     This is your Care EveryWhere ID. This could be used by other organizations to access your [place] medical records  [id number]        Your Vitals Were     Pulse Temperature Respirations Height BMI (Body Mass Index)       80 98.5  F (36.9  C) (Oral) 20 5' 8.25\" (1.734 m) 42.55 kg/m2        Blood Pressure from Last 3 Encounters:   03/09/17 (!) 140/100   03/01/17 138/78   02/21/17 (!) 144/94    Weight from Last 3 Encounters:   03/09/17 281 lb 14.4 oz (127.9 kg)   03/01/17 290 lb (131.5 kg)   02/21/17 292 lb (132.5 kg)              We Performed the Following     *UA reflex to Microscopic and Culture ([facility], [place] and [place] [facility] (except [place] and [place])     UROLOGY ADULT REFERRAL          Today's Medication Changes          These changes are accurate as of: 3/9/17 10:49 AM.  If you have any questions, ask your nurse or doctor.               These medicines have changed or have updated prescriptions.        Dose/Directions    lisinopril-hydrochlorothiazide 20-25 MG per tablet   Commonly known as:  PRINZIDE/ZESTORETIC   This may have changed:  how much to take   Used for:  Essential hypertension with goal blood pressure less than 140/90, Preop general physical exam   Changed by:  Kiel Georges PA-C        Dose:  2 tablet   Take 2 tablets by mouth daily   Quantity:  90 tablet   Refills:  0         Stop taking these medicines if you haven't already. Please contact your care team if you have questions.     HYDROcodone-acetaminophen 5-325 MG per tablet   Commonly known as: "  NORBERTO   Stopped by:  Kiel Georges PA-C                    Primary Care Provider Office Phone # Fax #    Kiel Georges PA-C [phone]       [facility] [address]  [facility] [address]        Thank you!     Thank you for choosing [facility]  for your care. Our goal is always to provide you with excellent care. Hearing back from our patients is one way we can continue to improve our services. Please take a few minutes to complete the written survey that you may receive in the mail after your visit with us. Thank you!             Your Updated Medication List - Protect others around you: Learn how to safely use, store and throw away your medicines at www.disposemymeds.org.          This list is accurate as of: 3/9/17 10:49 AM.  Always use your most recent med list.                   Brand Name Dispense Instructions for use    aspirin 325 MG tablet     20 tablet    Take 1 tablet (325 mg) by mouth 2 times daily for 10 days       lisinopril-hydrochlorothiazide 20-25 MG per tablet    PRINZIDE/ZESTORETIC    90 tablet    Take 2 tablets by mouth daily

## 2017-03-09 NOTE — NURSING NOTE
"Chief Complaint   Patient presents with     Groin Pain       Initial BP (!) 140/100 (BP Location: Right arm, Patient Position: Chair, Cuff Size: Adult Large)  Pulse 80  Temp 98.5  F (36.9  C) (Oral)  Resp 20  Ht 5' 8.25\" (1.734 m)  Wt 281 lb 14.4 oz (127.9 kg)  BMI 42.55 kg/m2 Estimated body mass index is 42.55 kg/(m^2) as calculated from the following:    Height as of this encounter: 5' 8.25\" (1.734 m).    Weight as of this encounter: 281 lb 14.4 oz (127.9 kg).  Medication Reconciliation: complete   Arabella GIRON, BERHANE,AAMA      "

## 2017-03-10 ENCOUNTER — OFFICE VISIT (OUTPATIENT)
Dept: UROLOGY | Facility: CLINIC | Age: 27
End: 2017-03-10
Payer: COMMERCIAL

## 2017-03-10 VITALS
HEART RATE: 88 BPM | DIASTOLIC BLOOD PRESSURE: 98 MMHG | HEIGHT: 68 IN | WEIGHT: 280 LBS | SYSTOLIC BLOOD PRESSURE: 138 MMHG | BODY MASS INDEX: 42.44 KG/M2

## 2017-03-10 DIAGNOSIS — N45.1 EPIDIDYMITIS, LEFT: Primary | ICD-10-CM

## 2017-03-10 DIAGNOSIS — R82.90 ABNORMAL FINDING ON URINALYSIS: ICD-10-CM

## 2017-03-10 DIAGNOSIS — N50.82 SCROTAL PAIN: ICD-10-CM

## 2017-03-10 LAB
ALBUMIN UR-MCNC: ABNORMAL MG/DL
APPEARANCE UR: CLEAR
BILIRUB UR QL STRIP: NEGATIVE
COLOR UR AUTO: YELLOW
GLUCOSE UR STRIP-MCNC: NEGATIVE MG/DL
HGB UR QL STRIP: ABNORMAL
KETONES UR STRIP-MCNC: NEGATIVE MG/DL
LEUKOCYTE ESTERASE UR QL STRIP: NEGATIVE
MUCOUS THREADS #/AREA URNS LPF: PRESENT /LPF
NITRATE UR QL: NEGATIVE
PH UR STRIP: 5.5 PH (ref 5–7)
RBC #/AREA URNS AUTO: 0 /HPF (ref 0–2)
SP GR UR STRIP: >1.03 (ref 1–1.03)
URN SPEC COLLECT METH UR: ABNORMAL
UROBILINOGEN UR STRIP-ACNC: 1 EU/DL (ref 0.2–1)
WBC #/AREA URNS AUTO: 0 /HPF (ref 0–2)

## 2017-03-10 PROCEDURE — 81001 URINALYSIS AUTO W/SCOPE: CPT | Performed by: PHYSICIAN ASSISTANT

## 2017-03-10 PROCEDURE — 99203 OFFICE O/P NEW LOW 30 MIN: CPT | Performed by: PHYSICIAN ASSISTANT

## 2017-03-10 RX ORDER — LEVOFLOXACIN 500 MG/1
500 TABLET, FILM COATED ORAL DAILY
Qty: 10 TABLET | Refills: 0 | Status: SHIPPED | OUTPATIENT
Start: 2017-03-10 | End: 2017-12-08

## 2017-03-10 RX ORDER — LISINOPRIL 10 MG/1
10 TABLET ORAL
COMMUNITY
Start: 2010-06-17 | End: 2017-12-08

## 2017-03-10 RX ORDER — OMEGA-3 FATTY ACIDS/FISH OIL 300-1000MG
CAPSULE ORAL EVERY 4 HOURS PRN
COMMUNITY
Start: 2009-02-24 | End: 2022-03-11

## 2017-03-10 ASSESSMENT — PAIN SCALES - GENERAL: PAINLEVEL: SEVERE PAIN (7)

## 2017-03-10 NOTE — MR AVS SNAPSHOT
"              After Visit Summary   3/10/2017    Cristiano Casanova    MRN: [id number]           Patient Information     Date Of Birth          1990        Visit Information        Provider Department      3/10/2017 4:00 PM Laly Fish PA-C [facility]        Today's Diagnoses     Epididymitis, left    -  1    Scrotal pain        Abnormal finding on urinalysis           Follow-ups after your visit        Who to contact     If you have questions or need follow up information about today's clinic visit or your schedule please contact [facility] directly at [phone].  Normal or non-critical lab and imaging results will be communicated to you by [employer], letter or phone within 4 business days after the clinic has received the results. If you do not hear from us within 7 days, please contact the clinic through M[employer] Koreat or phone. If you have a critical or abnormal lab result, we will notify you by phone as soon as possible.  Submit refill requests through [employer] or call your pharmacy and they will forward the refill request to us. Please allow 3 business days for your refill to be completed.          Additional Information About Your Visit        MyChart Information     [employer] gives you secure access to your electronic health record. If you see a primary care provider, you can also send messages to your care team and make appointments. If you have questions, please call your primary care clinic.  If you do not have a primary care provider, please call [phone] and they will assist you.        Care EveryWhere ID     This is your Care EveryWhere ID. This could be used by other organizations to access your [place] medical records  [id number]        Your Vitals Were     Pulse Height BMI (Body Mass Index)             88 1.727 m (5' 8\") 42.57 kg/m2          Blood Pressure from Last 3 Encounters:   03/10/17 (!) 138/98   03/09/17 " (!) 140/100   03/01/17 138/78    Weight from Last 3 Encounters:   03/10/17 127 kg (280 lb)   03/09/17 127.9 kg (281 lb 14.4 oz)   03/01/17 131.5 kg (290 lb)              We Performed the Following     UA without Microscopic [[id number]]     Urine Micro Urologic Phys [[id number]]          Today's Medication Changes          These changes are accurate as of: 3/10/17 11:59 PM.  If you have any questions, ask your nurse or doctor.               Start taking these medicines.        Dose/Directions    levofloxacin 500 MG tablet   Commonly known as:  LEVAQUIN   Used for:  Epididymitis, left   Started by:  Laly Fish PA-C        Dose:  500 mg   Take 1 tablet (500 mg) by mouth daily   Quantity:  10 tablet   Refills:  0            Where to get your medicines      These medications were sent to [facility] [address] [place] [id number]    Hours:  24-hours Phone:  [phone]     levofloxacin 500 MG tablet                Primary Care Provider Office Phone # Fax #    Kiel Georges PA-C [phone]       [facility] [address] [facility] [address]        Thank you!     Thank you for choosing [facility] [place]  for your care. Our goal is always to provide you with excellent care. Hearing back from our patients is one way we can continue to improve our services. Please take a few minutes to complete the written survey that you may receive in the mail after your visit with us. Thank you!             Your Updated Medication List - Protect others around you: Learn how to safely use, store and throw away your medicines at www.disposemymeds.org.          This list is accurate as of: 3/10/17 11:59 PM.  Always use your most recent med list.                   Brand Name Dispense Instructions for use    aspirin 325 MG tablet     20 tablet    Take 1 tablet (325  mg) by mouth 2 times daily for 10 days       ibuprofen 200 MG capsule          levofloxacin 500 MG tablet    LEVAQUIN    10 tablet    Take 1 tablet (500 mg) by mouth daily       lisinopril 10 MG tablet    PRINIVIL/ZESTRIL     Take 10 mg by mouth       lisinopril-hydrochlorothiazide 20-25 MG per tablet    PRINZIDE/ZESTORETIC    90 tablet    Take 2 tablets by mouth daily

## 2017-03-10 NOTE — LETTER
"3/10/2017       RE: Cristiano Casanova  [address] [place], [place] [address]     Dear Colleague,    Thank you for referring your patient, Cristiano Casanova, to the [facility] [place] at [facility]. Please see a copy of my visit note below.    CC: testicular pain.     HPI: It is a pleasure to see . Cristiano Casanova, a very pleasant 26 year old male seen in the urology clinic today in consultation from Dr. Georges for evaluation of the above. The patient reports intermittent symptoms of left-sided testicular pain, occurring over the past several months. Pain is localized to the left testicle - states it feels like the testicle is \"being squeezed.\" He has been treated in the past with antibiotics for similar symptoms. Wears boxers for underwear. No concern for STD's. Denies urinary symptoms such as dysuria, frequency, urgency, gross hematuria, pyuria, penile discharge. Had scrotal ultrasound in 10/2016 which revealed a small right hydrocele but was o/w benign.    Past Medical History   Diagnosis Date     Hypertension      Obesity      Sleep apnea      Past Surgical History   Procedure Laterality Date     Arthroscopic reconstruction anterior and posterior cruciate ligament, combined  2009     x2     Eye surgery       x3     Arthroscopy knee Right 3/1/2017     Procedure: ARTHROSCOPY KNEE;  Surgeon: Abdelrahman Cui MD;  Location:  OR     No known drug allergies  Current Outpatient Prescriptions   Medication     ibuprofen 200 MG capsule     lisinopril (PRINIVIL/ZESTRIL) 10 MG tablet     levofloxacin (LEVAQUIN) 500 MG tablet     lisinopril-hydrochlorothiazide (PRINZIDE/ZESTORETIC) 20-25 MG per tablet     aspirin 325 MG tablet     No current facility-administered medications for this visit.      Family History: There is no h/o  malignancy.  There is no h/o urolithiasis.     Social History: The patient does smoke cigarettes, minimal EtOH and no illicit drug " "use.    ROS:  A comprehensive 10 point review of systems was obtained is was negative except for that outlined above in the HPI.    PHYSICAL EXAM:  Vitals:    03/10/17 1603   BP: (!) 138/98   BP Location: Left arm   Patient Position: Dangled   Cuff Size: Adult Large   Pulse: 88   Weight: 127 kg (280 lb)   Height: 1.727 m (5' 8\")     GENERAL: Well developed, well nourished, male in no acute distress.  HEENT: Atraumatic, normocephalic.  RESP: No evidence of respiratory distress.  ABDOMEN:  Soft, nontender, no HSM or palpable masses.  No hernias.   :      Circumcised penis, no penile plaques or lesions.      Orthotopic location of the urethral meatus.     Scrotum normal.     Testicles of normal firmness and consistency, no masses.     Epididymes bilaterally without masses. Left epidiymis with moderate TTP and fullness.      Vas and cord normal bilaterally.  JULIETTE: Deferred.  PSYCH: Alert and oriented x 3, mood and affect normal.  Patient pleasant and agreeable during interview and exam today.     UA today demonstrates trace blood, trace protein, 0-2 RBC, 0-2 WBC, mucous.    Imaging:   US TESTICULAR AND SCROTAL, 10/15/201  Impression:   1. Small hydrocele on the right. Otherwise normal testicular  Ultrasound.    ASSESSMENT/PLAN:  Mr. Cristiano Casanova is a pleasant 26 year old male with intermittent left-sided testicular pain. Exam today consistent with left-sided epididymitis. Patient wears loose fitting boxers - cannot rule out intermittent testicular torsion as source for testicular pain, although exam today not c/w testicular torsion. Plan for the following:  -Treat with Levofloxacin 500 mg qday x 10 days for acute epididymitis.   - Encouraged good scrotal support with tighter-fitting underwear, NSAIDs PRN, avoidance of aggravating activities.    Follow up if symptoms persist or worsen. Warning signs and ER precautions discussed.     Thank you for the opportunity to participate in the care of this very pleasant patient. " I will keep you updated on his progress.    Laly Fish PA-C  Department of Urology

## 2017-03-13 NOTE — PROGRESS NOTES
"CC: testicular pain.     HPI: It is a pleasure to see . Cristiano Casanova, a very pleasant 26 year old male seen in the urology clinic today in consultation from Dr. Georges for evaluation of the above. The patient reports intermittent symptoms of left-sided testicular pain, occurring over the past several months. Pain is localized to the left testicle - states it feels like the testicle is \"being squeezed.\" He has been treated in the past with antibiotics for similar symptoms. Wears boxers for underwear. No concern for STD's. Denies urinary symptoms such as dysuria, frequency, urgency, gross hematuria, pyuria, penile discharge. Had scrotal ultrasound in 10/2016 which revealed a small right hydrocele but was o/w benign.    Past Medical History   Diagnosis Date     Hypertension      Obesity      Sleep apnea      Past Surgical History   Procedure Laterality Date     Arthroscopic reconstruction anterior and posterior cruciate ligament, combined  2009     x2     Eye surgery       x3     Arthroscopy knee Right 3/1/2017     Procedure: ARTHROSCOPY KNEE;  Surgeon: Abdelrahman Cui MD;  Location:  OR     No known drug allergies  Current Outpatient Prescriptions   Medication     ibuprofen 200 MG capsule     lisinopril (PRINIVIL/ZESTRIL) 10 MG tablet     levofloxacin (LEVAQUIN) 500 MG tablet     lisinopril-hydrochlorothiazide (PRINZIDE/ZESTORETIC) 20-25 MG per tablet     aspirin 325 MG tablet     No current facility-administered medications for this visit.      Family History: There is no h/o  malignancy.  There is no h/o urolithiasis.     Social History: The patient does smoke cigarettes, minimal EtOH and no illicit drug use.    ROS:  A comprehensive 10 point review of systems was obtained is was negative except for that outlined above in the HPI.    PHYSICAL EXAM:  Vitals:    03/10/17 1603   BP: (!) 138/98   BP Location: Left arm   Patient Position: Dangled   Cuff Size: Adult Large   Pulse: 88   Weight: 127 kg (280 " "lb)   Height: 1.727 m (5' 8\")     GENERAL: Well developed, well nourished, male in no acute distress.  HEENT: Atraumatic, normocephalic.  RESP: No evidence of respiratory distress.  ABDOMEN:  Soft, nontender, no HSM or palpable masses.  No hernias.   :      Circumcised penis, no penile plaques or lesions.      Orthotopic location of the urethral meatus.     Scrotum normal.     Testicles of normal firmness and consistency, no masses.     Epididymes bilaterally without masses. Left epidiymis with moderate TTP and fullness.      Vas and cord normal bilaterally.  JULIETTE: Deferred.  PSYCH: Alert and oriented x 3, mood and affect normal.  Patient pleasant and agreeable during interview and exam today.     UA today demonstrates trace blood, trace protein, 0-2 RBC, 0-2 WBC, mucous.    Imaging:   US TESTICULAR AND SCROTAL, 10/15/201  Impression:   1. Small hydrocele on the right. Otherwise normal testicular  Ultrasound.    ASSESSMENT/PLAN:  Mr. Cristiano Casanova is a pleasant 26 year old male with intermittent left-sided testicular pain. Exam today consistent with left-sided epididymitis. Patient wears loose fitting boxers - cannot rule out intermittent testicular torsion as source for testicular pain, although exam today not c/w testicular torsion. Plan for the following:  -Treat with Levofloxacin 500 mg qday x 10 days for acute epididymitis.   - Encouraged good scrotal support with tighter-fitting underwear, NSAIDs PRN, avoidance of aggravating activities.    Follow up if symptoms persist or worsen. Warning signs and ER precautions discussed.     Thank you for the opportunity to participate in the care of this very pleasant patient. I will keep you updated on his progress.    Laly Fish PA-C  Department of Urology    "

## 2017-03-17 ENCOUNTER — THERAPY VISIT (OUTPATIENT)
Dept: PHYSICAL THERAPY | Facility: CLINIC | Age: 27
End: 2017-03-17
Payer: COMMERCIAL

## 2017-03-17 DIAGNOSIS — Z47.89 AFTERCARE FOLLOWING SURGERY OF THE MUSCULOSKELETAL SYSTEM: ICD-10-CM

## 2017-03-17 DIAGNOSIS — M25.561 ACUTE PAIN OF RIGHT KNEE: Primary | ICD-10-CM

## 2017-03-17 PROCEDURE — 97161 PT EVAL LOW COMPLEX 20 MIN: CPT | Mod: GP | Performed by: PHYSICAL THERAPIST

## 2017-03-17 PROCEDURE — 97110 THERAPEUTIC EXERCISES: CPT | Mod: GP | Performed by: PHYSICAL THERAPIST

## 2017-03-17 PROCEDURE — 97530 THERAPEUTIC ACTIVITIES: CPT | Mod: GP | Performed by: PHYSICAL THERAPIST

## 2017-03-17 ASSESSMENT — ACTIVITIES OF DAILY LIVING (ADL)
SWELLING: I HAVE THE SYMPTOM BUT IT DOES NOT AFFECT MY ACTIVITY
WEAKNESS: THE SYMPTOM AFFECTS MY ACTIVITY SLIGHTLY
KNEE_ACTIVITY_OF_DAILY_LIVING_SUM: 51
GO UP STAIRS: ACTIVITY IS SOMEWHAT DIFFICULT
LIMPING: THE SYMPTOM AFFECTS MY ACTIVITY SLIGHTLY
GO DOWN STAIRS: ACTIVITY IS MINIMALLY DIFFICULT
WALK: ACTIVITY IS MINIMALLY DIFFICULT
KNEEL ON THE FRONT OF YOUR KNEE: ACTIVITY IS SOMEWHAT DIFFICULT
GIVING WAY, BUCKLING OR SHIFTING OF KNEE: THE SYMPTOM AFFECTS MY ACTIVITY SLIGHTLY
SIT WITH YOUR KNEE BENT: ACTIVITY IS NOT DIFFICULT
HOW_WOULD_YOU_RATE_THE_CURRENT_FUNCTION_OF_YOUR_KNEE_DURING_YOUR_USUAL_DAILY_ACTIVITIES_ON_A_SCALE_FROM_0_TO_100_WITH_100_BEING_YOUR_LEVEL_OF_KNEE_FUNCTION_PRIOR_TO_YOUR_INJURY_AND_0_BEING_THE_INABILITY_TO_PERFORM_ANY_OF_YOUR_USUAL_DAILY_ACTIVITIES?: 75
RAW_SCORE: 51
KNEE_ACTIVITY_OF_DAILY_LIVING_SCORE: 72.86
PAIN: THE SYMPTOM AFFECTS MY ACTIVITY SLIGHTLY
STIFFNESS: THE SYMPTOM AFFECTS MY ACTIVITY SLIGHTLY
SQUAT: ACTIVITY IS SOMEWHAT DIFFICULT
RISE FROM A CHAIR: ACTIVITY IS NOT DIFFICULT
STAND: ACTIVITY IS NOT DIFFICULT
AS_A_RESULT_OF_YOUR_KNEE_INJURY,_HOW_WOULD_YOU_RATE_YOUR_CURRENT_LEVEL_OF_DAILY_ACTIVITY?: NEARLY NORMAL
HOW_WOULD_YOU_RATE_THE_OVERALL_FUNCTION_OF_YOUR_KNEE_DURING_YOUR_USUAL_DAILY_ACTIVITIES?: NEARLY NORMAL

## 2017-03-17 NOTE — LETTER
[facility] PH[facility]ICAL [facility]ERAP[facility]  [address]  [facility] [address]  [phone]    2017    Re: Cristiano Casanova   :   1990  MRN:  [id number]   REFERRING PHYSICIAN:   Maria Elena Cazares    [facility] PH[facility]ICAL [facility]AP[facility]  Date of Initial Evaluation:  3/17/17  Visits:  Rxs Used: 1  Reason for Referral:  Acute pain of right knee; Aftercare following surgery of the musculoskeletal system    EVALUATION SUMMARY    Subjective:    Cristiano Casanova is a 26 year old male with a right knee condition.  Condition occurred with:  Repetition/overuse.  Condition occurred: for unknown reasons.  This is a new condition  Pt c/o R knee pain S/P R knee medial and lateral menisectomy and chondroplasty 3/1/17. Denies specific injury, but relates being very phsyically active. PMH: S/P R ACL  and .  To return work, light duty, next week (consturction).  Patient reports pain:  Anterior, medial and lateral.  Pain is described as shooting, stabbing and aching  and reported as 3/10.  Associated symptoms:  Loss of motion/stiffness and catching. Pain is the same all the time.  Symptoms are exacerbated by walking, ascending stairs, descending stairs, kneeling and bending/squatting and relieved by rest, ice and analgesics.  Since onset symptoms are gradually improving.  General health as reported by patient is good.  Pertinent medical history includes:  Overweight, high blood pressure and smoking.  Other surgeries include:  Orthopedic surgery (3 R knee ).  Current medications:  Cardiac medication.  Current occupation is Construction.  Primary job tasks include:  Prolonged standing, lifting, operating a machine, repetitive tasks, driving and other (operating maching, push/pull).               Objective:    Gait:  Gait Type:  Antalgic.  Deviations:  Knee:  Knee extension decr R       Knee Evaluation:  ROM:  Strength wnl knee: SLR R with min ext  lag.  AROM  Extension:  Left: 0    Right:  10  Flexion: Left: 138    Right: 120  PROM  Extension: Left: 0    Right:  3  Flexion: Left: 145    Right:  128  Strength:   Extension:  Left:/5  Strong/pain free  Pain:      Right: 4+ and 4/5    Pain:+  Flexion:  Left:/5  Strong/pain free  Pain:      Right: 4+/5    Pain:+/-    Quad Set Left: WNL    Pain:   Quad Set Right:  Fair and delayed    Pain: +/-  Re: Cristiano Casanova   :   1990          Palpation:    Right knee tenderness present at:  Medial Joint Line and Lateral Joint Line  Edema:  Edema of the knee: min swelling R knee jt line.  Functional Testing:    Quad:    Single Leg Squat:  Left:       Right:      Unable   Bilateral Leg Squat:   Mild loss of control    Proprioception:   Stork Balance Test:  Left:  30sec   Right:  20-25 sec increased mm activity  % of Uninvolved:     Assessment/Plan:      Patient is a 26 year old male with right side knee complaints.    Patient has the following significant findings with corresponding treatment plan.                Diagnosis 1:  S/P R knee scope  Pain -  hot/cold therapy and home program  Decreased ROM/flexibility - manual therapy and therapeutic exercise  Decreased strength - therapeutic exercise and therapeutic activities  Decreased proprioception - neuro re-education and therapeutic activities  Impaired gait - gait training  Impaired muscle performance - neuro re-education  Decreased function - therapeutic activities    Therapy Evaluation Codes:   1) History comprised of:   Personal factors that impact the plan of care:      None.    Comorbidity factors that impact the plan of care are:      High blood pressure, Overweight and Smoking.     Medications impacting care: Cardiac.  2) Examination of Body Systems comprised of:   Body structures and functions that impact the plan of care:      Knee.   Activity limitations that impact the plan of care are:      Jumping, Running, Squatting/kneeling, Stairs and  Walking.  3) Clinical presentation characteristics are:   Stable/Uncomplicated.  4) Decision-Making    Low complexity using standardized patient assessment instrument and/or measureable assessment of functional outcome.  Cumulative Therapy Evaluation is: Low complexity.    Previous and current functional limitations:  (See Goal Flow Sheet for this information)    Short term and Long term goals: (See Goal Flow Sheet for this information)           Re: Cristiano Casanova   :   1990          Communication ability:  Patient appears to be able to clearly communicate and understand verbal and written communication and follow directions correctly.  Treatment Explanation - The following has been discussed with the patient:   RX ordered/plan of care  Anticipated outcomes  Possible risks and side effects  This patient would benefit from PT intervention to resume normal activities.   Rehab potential is excellent.    Frequency:  1 X week, once daily  Duration:  for 8 weeks  Discharge Plan:  Achieve all LTG.  Independent in home treatment program.  Reach maximal therapeutic benefit.    Thank you for your referral.    INQUIRIES  Therapist: Chidi Rogers, [facility]  [facility] [place] [facility]  [address] [place] [id number]  Phone: [phone]  Fax: [phone]

## 2017-03-17 NOTE — PROGRESS NOTES
Subjective:    Cristiano Casanova is a 26 year old male with a right knee condition.  Condition occurred with:  Repetition/overuse.  Condition occurred: for unknown reasons.  This is a new condition  Pt c/o R knee pain S/P R knee medial and lateral menisectomy and chondroplasty 3/1/17. Denies specific injury, but relates being very phsyically active. PMH: S/P R ACL 2008 and 2009.  To return work, light duty, next week (consturction)..    Patient reports pain:  Anterior, medial and lateral.    Pain is described as shooting, stabbing and aching  and reported as 3/10.  Associated symptoms:  Loss of motion/stiffness and catching. Pain is the same all the time.  Symptoms are exacerbated by walking, ascending stairs, descending stairs, kneeling and bending/squatting and relieved by rest, ice and analgesics.  Since onset symptoms are gradually improving.        General health as reported by patient is good.  Pertinent medical history includes:  Overweight, high blood pressure and smoking.    Other surgeries include:  Orthopedic surgery (3 R knee ).  Current medications:  Cardiac medication.  Current occupation is Construction  .    Primary job tasks include:  Prolonged standing, lifting, operating a machine, repetitive tasks, driving and other (operating maching, push/pull).                              Objective:      Gait:    Gait Type:  Antalgic     Deviations:  Knee:  Knee extension decr R                                                      Knee Evaluation:  ROM:  Strength wnl knee: SLR R with min ext lag.  AROM      Extension:  Left: 0    Right:  10  Flexion: Left: 138    Right: 120  PROM      Extension: Left: 0    Right:  3  Flexion: Left: 145    Right:  128      Strength:     Extension:  Left:/5  Strong/pain free  Pain:      Right: 4+ and 4/5    Pain:+  Flexion:  Left:/5  Strong/pain free  Pain:      Right: 4+/5    Pain:+/-    Quad Set Left: WNL    Pain:   Quad Set Right:  Fair and delayed    Pain: +/-      Palpation:       Right knee tenderness present at:  Medial Joint Line and Lateral Joint Line  Edema:  Edema of the knee: min swelling R knee jt line.      Functional Testing:          Quad:    Single Leg Squat:  Left:       Right:      Unable   Bilateral Leg Squat:   Mild loss of control      Proprioception:   Stork Balance Test:  Left:  30sec   Right:  20-25 sec increased mm activity  % of Uninvolved:           General     ROS    Assessment/Plan:      Patient is a 26 year old male with right side knee complaints.    Patient has the following significant findings with corresponding treatment plan.                Diagnosis 1:  S/P R knee scope  Pain -  hot/cold therapy and home program  Decreased ROM/flexibility - manual therapy and therapeutic exercise  Decreased strength - therapeutic exercise and therapeutic activities  Decreased proprioception - neuro re-education and therapeutic activities  Impaired gait - gait training  Impaired muscle performance - neuro re-education  Decreased function - therapeutic activities    Therapy Evaluation Codes:   1) History comprised of:   Personal factors that impact the plan of care:      None.    Comorbidity factors that impact the plan of care are:      High blood pressure, Overweight and Smoking.     Medications impacting care: Cardiac.  2) Examination of Body Systems comprised of:   Body structures and functions that impact the plan of care:      Knee.   Activity limitations that impact the plan of care are:      Jumping, Running, Squatting/kneeling, Stairs and Walking.  3) Clinical presentation characteristics are:   Stable/Uncomplicated.  4) Decision-Making    Low complexity using standardized patient assessment instrument and/or measureable assessment of functional outcome.  Cumulative Therapy Evaluation is: Low complexity.    Previous and current functional limitations:  (See Goal Flow Sheet for this information)    Short term and Long term goals: (See Goal Flow Sheet for this  information)     Communication ability:  Patient appears to be able to clearly communicate and understand verbal and written communication and follow directions correctly.  Treatment Explanation - The following has been discussed with the patient:   RX ordered/plan of care  Anticipated outcomes  Possible risks and side effects  This patient would benefit from PT intervention to resume normal activities.   Rehab potential is excellent.    Frequency:  1 X week, once daily  Duration:  for 8 weeks  Discharge Plan:  Achieve all LTG.  Independent in home treatment program.  Reach maximal therapeutic benefit.    Please refer to the daily flowsheet for treatment today, total treatment time and time spent performing 1:1 timed codes.

## 2017-03-17 NOTE — MR AVS SNAPSHOT
After Visit Summary   3/17/2017    Cristiano Casanova    MRN: [id number]           Patient Information     Date Of Birth          1990        Visit Information        Provider Department      3/17/2017 1:00 PM Chidi Rogers, PT [facility]        Today's Diagnoses     Acute pain of right knee    -  1    Aftercare following surgery of the musculoskeletal system           Follow-ups after your visit        Your next 10 appointments already scheduled     Mar 20, 2017  3:30 PM CDT   PILAR Extremity with Chidi Rogers PT   [facility] ([facility])    [address]  [facility] [address]   [phone]            Mar 25, 2017 11:20 AM CDT   PILAR Extremity with Chidi Rogers PT   [facility] ([facility])    [address]  [facility] [id number] [phone]              Who to contact     If you have questions or need follow up information about today's clinic visit or your schedule please contact [facility] PHYSICAL THERAPY directly at [phone].  Normal or non-critical lab and imaging results will be communicated to you by MyChart, letter or phone within 4 business days after the clinic has received the results. If you do not hear from us within 7 days, please contact the clinic through Cloudwearhart or phone. If you have a critical or abnormal lab result, we will notify you by phone as soon as possible.  Submit refill requests through Avtozaper or call your pharmacy and they will forward the refill request to us. Please allow 3 business days for your refill to be completed.          Additional Information About Your Visit        MyChart Information     Avtozaper gives you secure access to your electronic health record. If you see a primary care provider, you can also send  messages to your care team and make appointments. If you have questions, please call your primary care clinic.  If you do not have a primary care provider, please call [phone] and they will assist you.        Care EveryWhere ID     This is your Care EveryWhere ID. This could be used by other organizations to access your [place] medical records  [id number]         Blood Pressure from Last 3 Encounters:   03/10/17 (!) 138/98   03/09/17 (!) 140/100   03/01/17 138/78    Weight from Last 3 Encounters:   03/10/17 127 kg (280 lb)   03/09/17 127.9 kg (281 lb 14.4 oz)   03/01/17 131.5 kg (290 lb)              We Performed the Following     HC PT EVAL, LOW COMPLEXITY     PILAR INITIAL EVAL REPORT     THERAPEUTIC ACTIVITIES     THERAPEUTIC EXERCISES        Primary Care Provider Office Phone # Fax #    Kiel Georges PA-C [phone]       [facility] [address] [facility] [address]        Thank you!     Thank you for [facility] FOR ATHLETIC MEDICINE [facility] PHYSICAL THERAPY  for your care. Our goal is always to provide you with excellent care. Hearing back from our patients is one way we can continue to improve our services. Please take a few minutes to complete the written survey that you may receive in the mail after your visit with us. Thank you!             Your Updated Medication List - Protect others around you: Learn how to safely use, store and throw away your medicines at www.disposemymeds.org.          This list is accurate as of: 3/17/17  1:28 PM.  Always use your most recent med list.                   Brand Name Dispense Instructions for use    ibuprofen 200 MG capsule          levofloxacin 500 MG tablet    LEVAQUIN    10 tablet    Take 1 tablet (500 mg) by mouth daily       lisinopril 10 MG tablet    PRINIVIL/ZESTRIL     Take 10 mg by mouth       lisinopril-hydrochlorothiazide 20-25 MG per tablet    PRINZIDE/ZESTORETIC    90 tablet    Take 2 tablets  by mouth daily

## 2017-03-20 ENCOUNTER — THERAPY VISIT (OUTPATIENT)
Dept: PHYSICAL THERAPY | Facility: CLINIC | Age: 27
End: 2017-03-20
Payer: COMMERCIAL

## 2017-03-20 DIAGNOSIS — M25.561 ACUTE PAIN OF RIGHT KNEE: ICD-10-CM

## 2017-03-20 DIAGNOSIS — Z47.89 AFTERCARE FOLLOWING SURGERY OF THE MUSCULOSKELETAL SYSTEM: ICD-10-CM

## 2017-03-20 PROCEDURE — 97112 NEUROMUSCULAR REEDUCATION: CPT | Mod: GP | Performed by: PHYSICAL THERAPIST

## 2017-03-20 PROCEDURE — 97110 THERAPEUTIC EXERCISES: CPT | Mod: GP | Performed by: PHYSICAL THERAPIST

## 2017-03-20 PROCEDURE — 97530 THERAPEUTIC ACTIVITIES: CPT | Mod: GP | Performed by: PHYSICAL THERAPIST

## 2017-03-25 ENCOUNTER — THERAPY VISIT (OUTPATIENT)
Dept: PHYSICAL THERAPY | Facility: CLINIC | Age: 27
End: 2017-03-25
Payer: COMMERCIAL

## 2017-03-25 DIAGNOSIS — M25.561 ACUTE PAIN OF RIGHT KNEE: ICD-10-CM

## 2017-03-25 DIAGNOSIS — Z47.89 AFTERCARE FOLLOWING SURGERY OF THE MUSCULOSKELETAL SYSTEM: ICD-10-CM

## 2017-03-25 PROCEDURE — 97110 THERAPEUTIC EXERCISES: CPT | Mod: GP | Performed by: PHYSICAL THERAPIST

## 2017-03-25 PROCEDURE — 97530 THERAPEUTIC ACTIVITIES: CPT | Mod: GP | Performed by: PHYSICAL THERAPIST

## 2017-03-25 PROCEDURE — 97112 NEUROMUSCULAR REEDUCATION: CPT | Mod: GP | Performed by: PHYSICAL THERAPIST

## 2017-04-11 ENCOUNTER — TELEPHONE (OUTPATIENT)
Dept: PHARMACY | Facility: CLINIC | Age: 27
End: 2017-04-11

## 2017-04-11 NOTE — TELEPHONE ENCOUNTER
Please have patient come recheck BP at pharmacy or in clinic    Annie Zamarripa, PharmD [facility]  Medication Therapy Management Practitioner   #[phone]

## 2017-04-14 ENCOUNTER — THERAPY VISIT (OUTPATIENT)
Dept: PHYSICAL THERAPY | Facility: CLINIC | Age: 27
End: 2017-04-14
Payer: COMMERCIAL

## 2017-04-14 DIAGNOSIS — Z47.89 AFTERCARE FOLLOWING SURGERY OF THE MUSCULOSKELETAL SYSTEM: ICD-10-CM

## 2017-04-14 DIAGNOSIS — M25.561 ACUTE PAIN OF RIGHT KNEE: ICD-10-CM

## 2017-04-14 PROCEDURE — 97530 THERAPEUTIC ACTIVITIES: CPT | Mod: GP | Performed by: PHYSICAL THERAPIST

## 2017-04-14 PROCEDURE — 97110 THERAPEUTIC EXERCISES: CPT | Mod: GP | Performed by: PHYSICAL THERAPIST

## 2017-04-14 PROCEDURE — 97112 NEUROMUSCULAR REEDUCATION: CPT | Mod: GP | Performed by: PHYSICAL THERAPIST

## 2017-04-22 ENCOUNTER — THERAPY VISIT (OUTPATIENT)
Dept: PHYSICAL THERAPY | Facility: CLINIC | Age: 27
End: 2017-04-22
Payer: COMMERCIAL

## 2017-04-22 DIAGNOSIS — Z47.89 AFTERCARE FOLLOWING SURGERY OF THE MUSCULOSKELETAL SYSTEM: ICD-10-CM

## 2017-04-22 DIAGNOSIS — M25.561 ACUTE PAIN OF RIGHT KNEE: ICD-10-CM

## 2017-04-22 PROCEDURE — 97530 THERAPEUTIC ACTIVITIES: CPT | Mod: GP | Performed by: PHYSICAL THERAPIST

## 2017-04-22 PROCEDURE — 97110 THERAPEUTIC EXERCISES: CPT | Mod: GP | Performed by: PHYSICAL THERAPIST

## 2017-04-22 PROCEDURE — 97112 NEUROMUSCULAR REEDUCATION: CPT | Mod: GP | Performed by: PHYSICAL THERAPIST

## 2017-05-20 ENCOUNTER — THERAPY VISIT (OUTPATIENT)
Dept: PHYSICAL THERAPY | Facility: CLINIC | Age: 27
End: 2017-05-20
Payer: COMMERCIAL

## 2017-05-20 DIAGNOSIS — Z47.89 AFTERCARE FOLLOWING SURGERY OF THE MUSCULOSKELETAL SYSTEM: ICD-10-CM

## 2017-05-20 DIAGNOSIS — M25.561 ACUTE PAIN OF RIGHT KNEE: ICD-10-CM

## 2017-05-20 PROCEDURE — 97110 THERAPEUTIC EXERCISES: CPT | Mod: GP | Performed by: PHYSICAL THERAPIST

## 2017-05-20 PROCEDURE — 97530 THERAPEUTIC ACTIVITIES: CPT | Mod: GP | Performed by: PHYSICAL THERAPIST

## 2017-05-20 PROCEDURE — 97112 NEUROMUSCULAR REEDUCATION: CPT | Mod: GP | Performed by: PHYSICAL THERAPIST

## 2017-05-20 ASSESSMENT — ACTIVITIES OF DAILY LIVING (ADL)
WEAKNESS: I DO NOT HAVE THE SYMPTOM
PAIN: I DO NOT HAVE THE SYMPTOM
KNEE_ACTIVITY_OF_DAILY_LIVING_SCORE: 98.57
GIVING WAY, BUCKLING OR SHIFTING OF KNEE: I DO NOT HAVE THE SYMPTOM
SWELLING: I DO NOT HAVE THE SYMPTOM
KNEE_ACTIVITY_OF_DAILY_LIVING_SUM: 69
SQUAT: ACTIVITY IS NOT DIFFICULT
AS_A_RESULT_OF_YOUR_KNEE_INJURY,_HOW_WOULD_YOU_RATE_YOUR_CURRENT_LEVEL_OF_DAILY_ACTIVITY?: NEARLY NORMAL
WALK: ACTIVITY IS NOT DIFFICULT
HOW_WOULD_YOU_RATE_THE_OVERALL_FUNCTION_OF_YOUR_KNEE_DURING_YOUR_USUAL_DAILY_ACTIVITIES?: NEARLY NORMAL
RISE FROM A CHAIR: ACTIVITY IS NOT DIFFICULT
KNEEL ON THE FRONT OF YOUR KNEE: ACTIVITY IS MINIMALLY DIFFICULT
STIFFNESS: I DO NOT HAVE THE SYMPTOM
LIMPING: I DO NOT HAVE THE SYMPTOM
SIT WITH YOUR KNEE BENT: ACTIVITY IS NOT DIFFICULT
GO UP STAIRS: ACTIVITY IS NOT DIFFICULT
GO DOWN STAIRS: ACTIVITY IS NOT DIFFICULT
STAND: ACTIVITY IS NOT DIFFICULT
RAW_SCORE: 69
HOW_WOULD_YOU_RATE_THE_CURRENT_FUNCTION_OF_YOUR_KNEE_DURING_YOUR_USUAL_DAILY_ACTIVITIES_ON_A_SCALE_FROM_0_TO_100_WITH_100_BEING_YOUR_LEVEL_OF_KNEE_FUNCTION_PRIOR_TO_YOUR_INJURY_AND_0_BEING_THE_INABILITY_TO_PERFORM_ANY_OF_YOUR_USUAL_DAILY_ACTIVITIES?: 85

## 2017-05-20 NOTE — LETTER
"[facility] ATHLETIC [facility] PHYSICAL THERAPY  [address]  [facility] [address]  [phone]    May 22, 2017    Re: Cristiano Casanova   :   1990  MRN:  [id number]   REFERRING PHYSICIAN:   Maria Elena Cazares    [facility] ATHLETIC [facility] PHYSICAL [facility]  Date of Initial Evaluation:  3/17/17  Visits:  Rxs Used: 6  Reason for Referral:     Acute pain of right knee  Aftercare following surgery of the musculoskeletal system    DISCHARGE REPORT  Progress reporting period is from IE to 17.       SUBJECTIVE  Subjective changes noted by patient:  .  Subjective: no c/o, full work pain free.    Current pain level is  Current Pain level: 0/10.     Previous pain level was   Initial Pain level: 3/10.   Changes in function:  Yes (See Goal flowsheet attached for changes in current functional level)  Adverse reaction to treatment or activity: None  Knee Activity of Daily Living Score: 98.57            OBJECTIVE  Changes noted in objective findings:    Objective: Full pain free ROM R knee.  MMT: 5/5, 6\" step good control.  SLS 30sec uneven surface and outside force, good control 4 direction reach out     ASSESSMENT/PLAN  Updated problem list and treatment plan: Diagnosis 1:  S/P R knee scope  Pain -  home program  Decreased ROM/flexibility - home program  Decreased strength - home program  Decreased proprioception - home program  Impaired muscle performance - home program  Decreased function - home program  STG/LTGs have been met or progress has been made towards goals:  Yes (See Goal flow sheet completed today.)  Assessment of Progress: The patient's condition is improving.  The patient has met all of their long term goals.  Self Management Plans:  Patient is independent in a home treatment program.  Patient is independent in self management of symptoms.      Cristiano Casanova         I have re-evaluated this patient and find that the nature, scope, duration and intensity of " the therapy is appropriate for the medical condition of the patient.  Cristiano continues to require the following intervention to meet STG and LTG's:  PT intervention is no longer required to meet STG/LTG.    Recommendations:  This patient is ready to be discharged from therapy and continue their home treatment program.    Please refer to the daily flowsheet for treatment today, total treatment time and time spent performing 1:1 timed codes.    Thank you for your referral.    INQUIRIES  Therapist: Dania Rogers, PT  [facility]  [address] [place] [address]  Phone: [phone]  Fax: [phone]

## 2017-05-20 NOTE — MR AVS SNAPSHOT
After Visit Summary   5/20/2017    Cristiano Casanova    MRN: [id number]           Patient Information     Date Of Birth          1990        Visit Information        Provider Department      5/20/2017 11:20 AM Chidi Rogers PT [facility]        Today's Diagnoses     Acute pain of right knee        Aftercare following surgery of the musculoskeletal system           Follow-ups after your visit        Who to contact     If you have questions or need follow up information about today's clinic visit or your schedule please contact [facility] ATHLETIC [facility] PHYSICAL [facility] directly at [phone].  Normal or non-critical lab and imaging results will be communicated to you by Lucky Anthart, letter or phone within 4 business days after the clinic has received the results. If you do not hear from us within 7 days, please contact the clinic through Wibiyat or phone. If you have a critical or abnormal lab result, we will notify you by phone as soon as possible.  Submit refill requests through [email or web address] or call your pharmacy and they will forward the refill request to us. Please allow 3 business days for your refill to be completed.          Additional Information About Your Visit        MyChart Information     [email or web address] gives you secure access to your electronic health record. If you see a primary care provider, you can also send messages to your care team and make appointments. If you have questions, please call your primary care clinic.  If you do not have a primary care provider, please call [phone] and they will assist you.        Care EveryWhere ID     This is your Care EveryWhere ID. This could be used by other organizations to access your [place] medical records  [id number]         Blood Pressure from Last 3 Encounters:   03/10/17 (!) 138/98   03/09/17 (!) 140/100   03/01/17 138/78    Weight from Last 3 Encounters:   03/10/17  127 kg (280 lb)   03/09/17 127.9 kg (281 lb 14.4 oz)   03/01/17 131.5 kg (290 lb)              We Performed the Following     PILAR PROGRESS NOTES REPORT     NEUROMUSCULAR RE-EDUCATION     THERAPEUTIC ACTIVITIES     THERAPEUTIC EXERCISES        Primary Care Provider Office Phone # Fax #    Kiel Georges PA-C [phone]       [facility] [address] [address] [facility] [address]        Thank you!     Thank you for choosing [place] FOR ATHLETIC MEDICINE [facility] PHYSICAL THERAPY  for your care. Our goal is always to provide you with excellent care. Hearing back from our patients is one way we can continue to improve our services. Please take a few minutes to complete the written survey that you may receive in the mail after your visit with us. Thank you!             Your Updated Medication List - Protect others around you: Learn how to safely use, store and throw away your medicines at www.disposemymeds.org.          This list is accurate as of: 5/20/17 11:49 AM.  Always use your most recent med list.                   Brand Name Dispense Instructions for use    ibuprofen 200 MG capsule          levofloxacin 500 MG tablet    LEVAQUIN    10 tablet    Take 1 tablet (500 mg) by mouth daily       lisinopril 10 MG tablet    PRINIVIL/ZESTRIL     Take 10 mg by mouth       lisinopril-hydrochlorothiazide 20-25 MG per tablet    PRINZIDE/ZESTORETIC    90 tablet    Take 2 tablets by mouth daily

## 2017-05-20 NOTE — PROGRESS NOTES
"Subjective:    HPI       Knee Activity of Daily Living Score: 98.57            Objective:    System    Physical Exam    General     ROS    Assessment/Plan:      DISCHARGE REPORT    Progress reporting period is from IE to 5/20/17.       SUBJECTIVE  Subjective changes noted by patient:  .  Subjective: no c/o, full work pain free.    Current pain level is  Current Pain level: 0/10.     Previous pain level was   Initial Pain level: 3/10.   Changes in function:  Yes (See Goal flowsheet attached for changes in current functional level)  Adverse reaction to treatment or activity: None    OBJECTIVE  Changes noted in objective findings:    Objective: Full pain free ROM R knee.  MMT: 5/5, 6\" step good control.  SLS 30sec uneven surface and outside force, good control 4 direction reach out     ASSESSMENT/PLAN  Updated problem list and treatment plan: Diagnosis 1:  S/P R knee scope  Pain -  home program  Decreased ROM/flexibility - home program  Decreased strength - home program  Decreased proprioception - home program  Impaired muscle performance - home program  Decreased function - home program  STG/LTGs have been met or progress has been made towards goals:  Yes (See Goal flow sheet completed today.)  Assessment of Progress: The patient's condition is improving.  The patient has met all of their long term goals.  Self Management Plans:  Patient is independent in a home treatment program.  Patient is independent in self management of symptoms.  I have re-evaluated this patient and find that the nature, scope, duration and intensity of the therapy is appropriate for the medical condition of the patient.  Cristiano continues to require the following intervention to meet STG and LTG's:  PT intervention is no longer required to meet STG/LTG.    Recommendations:  This patient is ready to be discharged from therapy and continue their home treatment program.    Please refer to the daily flowsheet for treatment today, total treatment time " and time spent performing 1:1 timed codes.

## 2017-05-26 ENCOUNTER — TELEPHONE (OUTPATIENT)
Dept: FAMILY MEDICINE | Facility: CLINIC | Age: 27
End: 2017-05-26

## 2017-05-26 NOTE — TELEPHONE ENCOUNTER
Panel Management Review      Patient has the following on his problem list:     Hypertension   Last three blood pressure readings:  BP Readings from Last 3 Encounters:   03/10/17 (!) 138/98   03/09/17 (!) 140/100   03/01/17 138/78     Blood pressure: FAILED    HTN Guidelines:  Age 18-59 BP range:  Less than 140/90  Age 60-85 with Diabetes:  Less than 140/90  Age 60-85 without Diabetes:  less than 150/90      Composite cancer screening  Chart review shows that this patient is due/due soon for the following None  Summary:    Patient is due/failing the following:   BP CHECK    Action needed:   Patient needs office visit for BP.    Type of outreach:    Sent Sirigen message.    Questions for provider review:    None                                                                                                                                    Kd Burks [facility]       Chart routed to Care Team .

## 2017-06-23 ENCOUNTER — TELEPHONE (OUTPATIENT)
Dept: FAMILY MEDICINE | Facility: CLINIC | Age: 27
End: 2017-06-23

## 2017-06-23 DIAGNOSIS — Z23 NEED FOR VACCINATION: Primary | ICD-10-CM

## 2017-06-23 NOTE — TELEPHONE ENCOUNTER
I am ok with this permissive use vaccination schedule. Plan for today, in 2 months, and then in 6 months from today.

## 2017-06-23 NOTE — TELEPHONE ENCOUNTER
Mom calling,    Would like HPV ordered as discussed.     Linda ALVA RN, BSN, PHN  [place] [facility] RN

## 2017-06-24 ENCOUNTER — ALLIED HEALTH/NURSE VISIT (OUTPATIENT)
Dept: NURSING | Facility: CLINIC | Age: 27
End: 2017-06-24
Payer: COMMERCIAL

## 2017-06-24 DIAGNOSIS — Z23 NEED FOR VACCINATION: ICD-10-CM

## 2017-06-24 PROCEDURE — 90471 IMMUNIZATION ADMIN: CPT

## 2017-06-24 PROCEDURE — 90651 9VHPV VACCINE 2/3 DOSE IM: CPT

## 2017-06-24 NOTE — MR AVS SNAPSHOT
After Visit Summary   6/24/2017    Cristiano Casanova    MRN: [id number]           Patient Information     Date Of Birth          1990        Visit Information        Provider Department      6/24/2017 10:00 AM MIREYA YAP MA/SRAVANI [facility]        Today's Diagnoses     Need for vaccination           Follow-ups after your visit        Your next 10 appointments already scheduled     Jul 22, 2017 10:00 AM CDT   Nurse Only with MIREYA BARRETO   [facility] ([facility])    [address] [place] [address] [phone]              Who to contact     If you have questions or need follow up information about today's clinic visit or your schedule please contact [facility] directly at [phone].  Normal or non-critical lab and imaging results will be communicated to you by MyChart, letter or phone within 4 business days after the clinic has received the results. If you do not hear from us within 7 days, please contact the clinic through MyChart or phone. If you have a critical or abnormal lab result, we will notify you by phone as soon as possible.  Submit refill requests through Macheen or call your pharmacy and they will forward the refill request to us. Please allow 3 business days for your refill to be completed.          Additional Information About Your Visit        MyChart Information     Macheen gives you secure access to your electronic health record. If you see a primary care provider, you can also send messages to your care team and make appointments. If you have questions, please call your primary care clinic.  If you do not have a primary care provider, please call [phone] and they will assist you.        Care EveryWhere ID     This is your Care EveryWhere ID. This could be used by other organizations to access your [place] medical records  [id number]         Blood Pressure from Last 3  Encounters:   03/10/17 (!) 138/98   03/09/17 (!) 140/100   03/01/17 138/78    Weight from Last 3 Encounters:   03/10/17 280 lb (127 kg)   03/09/17 281 lb 14.4 oz (127.9 kg)   03/01/17 290 lb (131.5 kg)              We Performed the Following     HUMAN PAPILLOMA VIRUS (GARDASIL 9) VACCINE        Primary Care Provider Office Phone # Fax #    Kiel Georges PA-C [phone]       [facility] [place] [address]  [place] [address]        Equal Access to Services     [facility] : Hadii aad ku hadasho Sobroderick, waaxda luqadaha, qaybta kaalmada adeegyada, maddy humphries haysara weber . So [facility] [phone].    ATENCIÓN: Si habla español, tiene a ratliff disposición servicios gratuitos de asistencia lingüística. Ll[facility] [phone].    We comply with applicable federal civil rights laws and [place] laws. We do not discriminate on the basis of race, color, national origin, age, disability sex, sexual orientation or gender identity.            Thank you!     Thank you for choosing [facility]  for your care. Our goal is always to provide you with excellent care. Hearing back from our patients is one way we can continue to improve our services. Please take a few minutes to complete the written survey that you may receive in the mail after your visit with us. Thank you!             Your Updated Medication List - Protect others around you: Learn how to safely use, store and throw away your medicines at www.disposemymeds.org.          This list is accurate as of: 6/24/17 10:08 AM.  Always use your most recent med list.                   Brand Name Dispense Instructions for use Diagnosis    ibuprofen 200 MG capsule           levofloxacin 500 MG tablet    LEVAQUIN    10 tablet    Take 1 tablet (500 mg) by mouth daily    Epididymitis, left       lisinopril 10 MG tablet    PRINIVIL/ZESTRIL     Take 10 mg by mouth        lisinopril-hydrochlorothiazide 20-25 MG per  tablet    PRINZIDE/ZESTORETIC    90 tablet    Take 2 tablets by mouth daily    Essential hypertension with goal blood pressure less than 140/90

## 2017-06-24 NOTE — NURSING NOTE
Screening Questionnaire for Adult Immunization    Are you sick today?   No   Do you have allergies to medications, food, a vaccine component or latex?   No   Have you ever had a serious reaction after receiving a vaccination?   No   Do you have a long-term health problem with heart disease, lung disease, asthma, kidney disease, metabolic disease (e.g. diabetes), anemia, or other blood disorder?   No   Do you have cancer, leukemia, HIV/AIDS, or any other immune system problem?   No   In the past 3 months, have you taken medications that affect  your immune system, such as prednisone, other steroids, or anticancer drugs; drugs for the treatment of rheumatoid arthritis, Crohn s disease, or psoriasis; or have you had radiation treatments?   No   Have you had a seizure, or a brain or other nervous system problem?   No   During the past year, have you received a transfusion of blood or blood     products, or been given immune (gamma) globulin or antiviral drug?   No   For women: Are you pregnant or is there a chance you could become        pregnant during the next month?   No   Have you received any vaccinations in the past 4 weeks?   No     Immunization questionnaire answers were all negative.      MNVFC doesn't apply on this patient    Per orders of Dr. Georges, injection of gardasil given by Pancho De Paz. Patient instructed to remain in clinic for 20 minutes afterwards, and to report any adverse reaction to me immediately.       Screening performed by Pancho De Paz on 6/24/2017 at 10:05 AM.

## 2017-07-22 ENCOUNTER — ALLIED HEALTH/NURSE VISIT (OUTPATIENT)
Dept: NURSING | Facility: CLINIC | Age: 27
End: 2017-07-22
Payer: COMMERCIAL

## 2017-07-22 DIAGNOSIS — Z23 ENCOUNTER FOR IMMUNIZATION: Primary | ICD-10-CM

## 2017-07-22 PROCEDURE — 90651 9VHPV VACCINE 2/3 DOSE IM: CPT

## 2017-07-22 PROCEDURE — 99207 ZZC NO CHARGE NURSE ONLY: CPT

## 2017-07-22 PROCEDURE — 90471 IMMUNIZATION ADMIN: CPT

## 2017-07-25 ENCOUNTER — DOCUMENTATION ONLY (OUTPATIENT)
Dept: FAMILY MEDICINE | Facility: CLINIC | Age: 27
End: 2017-07-25

## 2017-07-25 NOTE — PROGRESS NOTES
Panel Management Review      Patient has the following on his problem list:     Hypertension   Last three blood pressure readings:  BP Readings from Last 3 Encounters:   03/10/17 (!) 138/98   03/09/17 (!) 140/100   03/01/17 138/78     Blood pressure: FAILED    HTN Guidelines:  Age 18-59 BP range:  Less than 140/90  Age 60-85 with Diabetes:  Less than 140/90  Age 60-85 without Diabetes:  less than 150/90        Composite cancer screening  Chart review shows that this patient is due/due soon for the following None  Summary:    Patient is due/failing the following:   BP CHECK    Action needed:   Patient needs office visit for BP.    Type of outreach:    Sent [employer] message.    Questions for provider review:    None                                                                                                                                    [facility]       Chart routed to Care Team .

## 2017-09-11 ENCOUNTER — ALLIED HEALTH/NURSE VISIT (OUTPATIENT)
Dept: NURSING | Facility: CLINIC | Age: 27
End: 2017-09-11
Payer: COMMERCIAL

## 2017-09-11 DIAGNOSIS — Z23 NEED FOR PROPHYLACTIC VACCINATION AND INOCULATION AGAINST INFLUENZA: Primary | ICD-10-CM

## 2017-09-11 PROCEDURE — 90471 IMMUNIZATION ADMIN: CPT

## 2017-09-11 PROCEDURE — 90686 IIV4 VACC NO PRSV 0.5 ML IM: CPT

## 2017-09-11 PROCEDURE — 99207 ZZC NO CHARGE NURSE ONLY: CPT

## 2017-09-11 NOTE — PROGRESS NOTES
Injectable Influenza Immunization Documentation    1.  Are you sick today? (Fever of 100.5 or higher on the day of the clinic)   No    2.  Have you ever had Guillain-[place] Syndrome within 6 weeks of an influenza vaccionation?  No    3. Do you have a life-threatening allergy to eggs?  No    4. Do you have a life-threatening allergy to a component of the vaccine? May include antibiotics, gelatin or latex.  No     5. Have you ever had a reaction to a dose of flu vaccine that needed immediate medical attention?  No     Form completed by Elisa Maria/[facility]

## 2017-09-11 NOTE — MR AVS SNAPSHOT
After Visit Summary   9/11/2017    Cristiano Casanova    MRN: [id number]           Patient Information     Date Of Birth          1990        Visit Information        Provider Department      9/11/2017 5:45 PM CR GOLD MA/LPN [facility]        Today's Diagnoses     Need for prophylactic vaccination and inoculation against influenza    -  1       Follow-ups after your visit        Your next 10 appointments already scheduled     Sep 11, 2017  5:45 PM CDT   Nurse Only with MIREYA KELLY MA/SRAVANI   [facility] ([facility]    [address] [address] [phone]              Who to contact     If you have questions or need follow up information about today's clinic visit or your schedule please contact [facility] directly at [phone].  Normal or non-critical lab and imaging results will be communicated to you by MyChart, letter or phone within 4 business days after the clinic has received the results. If you do not hear from us within 7 days, please contact the clinic through [employer] or phone. If you have a critical or abnormal lab result, we will notify you by phone as soon as possible.  Submit refill requests through [employer] or call your pharmacy and they will forward the refill request to us. Please allow 3 business days for your refill to be completed.          Additional Information About Your Visit        MyChart Information     [employer] gives you secure access to your electronic health record. If you see a primary care provider, you can also send messages to your care team and make appointments. If you have questions, please call your primary care clinic.  If you do not have a primary care provider, please call [phone] and they will assist you.        Care EveryWhere ID     This is your Care EveryWhere ID. This could be used by other organizations to access your [facility]  records  [id number]         Blood Pressure from Last 3 Encounters:   03/10/17 (!) 138/98   03/09/17 (!) 140/100   03/01/17 138/78    Weight from Last 3 Encounters:   03/10/17 280 lb (127 kg)   03/09/17 281 lb 14.4 oz (127.9 kg)   03/01/17 290 lb (131.5 kg)              We Performed the Following     FLU VAC, SPLIT VIRUS IM > 3 YO (QUADRIVALENT) [69363]     Vaccine Administration, Initial [09487]        Primary Care Provider Office Phone # Fax #    Kiel Georges PA-C [phone] [address] [facility] [address]        Equal Access to Services     KATHY METCALF : Hadii toñito ku hadasho Sobrendenali, waaxda luqadaha, qaybta kaalmada adeegyada, waxstefania nunez. So [facility] [phone].    ATENCIÓN: Si habla español, tiene a ratliff disposición servicios gratuitos de asistencia lingüística. Llame al [phone].    We comply with applicable federal civil rights laws and [place] laws. We do not discriminate on the basis of race, color, national origin, age, disability sex, sexual orientation or gender identity.            Thank you!     Thank you for choosing [facility]  for your care. Our goal is always to provide you with excellent care. Hearing back from our patients is one way we can continue to improve our services. Please take a few minutes to complete the written survey that you may receive in the mail after your visit with us. Thank you!             Your Updated Medication List - Protect others around you: Learn how to safely use, store and throw away your medicines at www.disposemymeds.org.          This list is accurate as of: 9/11/17  5:42 PM.  Always use your most recent med list.                   Brand Name Dispense Instructions for use Diagnosis    ibuprofen 200 MG capsule           levofloxacin 500 MG tablet    LEVAQUIN    10 tablet    Take 1 tablet (500 mg) by mouth daily    Epididymitis, left       lisinopril 10 MG tablet     PRINIVIL/ZESTRIL     Take 10 mg by mouth        lisinopril-hydrochlorothiazide 20-25 MG per tablet    PRINZIDE/ZESTORETIC    90 tablet    Take 2 tablets by mouth daily    Essential hypertension with goal blood pressure less than 140/90

## 2017-12-05 ENCOUNTER — MYC MEDICAL ADVICE (OUTPATIENT)
Dept: FAMILY MEDICINE | Facility: CLINIC | Age: 27
End: 2017-12-05

## 2017-12-08 ENCOUNTER — OFFICE VISIT (OUTPATIENT)
Dept: FAMILY MEDICINE | Facility: CLINIC | Age: 27
End: 2017-12-08
Payer: COMMERCIAL

## 2017-12-08 ENCOUNTER — OFFICE VISIT (OUTPATIENT)
Dept: SLEEP MEDICINE | Facility: CLINIC | Age: 27
End: 2017-12-08
Payer: COMMERCIAL

## 2017-12-08 VITALS
HEIGHT: 68 IN | HEART RATE: 107 BPM | SYSTOLIC BLOOD PRESSURE: 152 MMHG | BODY MASS INDEX: 45.92 KG/M2 | OXYGEN SATURATION: 95 % | TEMPERATURE: 97.5 F | DIASTOLIC BLOOD PRESSURE: 88 MMHG | WEIGHT: 303 LBS

## 2017-12-08 VITALS
HEART RATE: 105 BPM | HEIGHT: 68 IN | RESPIRATION RATE: 20 BRPM | OXYGEN SATURATION: 95 % | BODY MASS INDEX: 46.29 KG/M2 | WEIGHT: 305.4 LBS | DIASTOLIC BLOOD PRESSURE: 79 MMHG | SYSTOLIC BLOOD PRESSURE: 131 MMHG

## 2017-12-08 DIAGNOSIS — G47.33 OSA (OBSTRUCTIVE SLEEP APNEA): Primary | ICD-10-CM

## 2017-12-08 DIAGNOSIS — I10 ESSENTIAL HYPERTENSION WITH GOAL BLOOD PRESSURE LESS THAN 140/90: Primary | ICD-10-CM

## 2017-12-08 DIAGNOSIS — G47.33 OSA (OBSTRUCTIVE SLEEP APNEA): ICD-10-CM

## 2017-12-08 DIAGNOSIS — E66.01 MORBID OBESITY (H): ICD-10-CM

## 2017-12-08 PROCEDURE — 99204 OFFICE O/P NEW MOD 45 MIN: CPT | Performed by: PHYSICIAN ASSISTANT

## 2017-12-08 PROCEDURE — 99214 OFFICE O/P EST MOD 30 MIN: CPT | Performed by: PHYSICIAN ASSISTANT

## 2017-12-08 RX ORDER — LISINOPRIL AND HYDROCHLOROTHIAZIDE 20; 25 MG/1; MG/1
2 TABLET ORAL DAILY
Qty: 180 TABLET | Refills: 0 | Status: SHIPPED | OUTPATIENT
Start: 2017-12-08 | End: 2018-04-10

## 2017-12-08 NOTE — PATIENT INSTRUCTIONS
You will be provided with an auto-titrating CPAP with a pressure range of 10-17 cm with heated humidity to limit nasal congestion. Adjust the heat level on humidifier to find a setting that prevents dry nose but does not cause condensation in the hose or mask. Use distilled water in the humidifier.  The CPAP has a ramp function that starts the pressure lower than your prescribed pressure and gradually increases it over a number of minutes.  This may make it easier to fall asleep.    Try to use the CPAP every-night, all night (minimum of 4 hours). Many insurances require that we prove you are using the CPAP at least 4 hours on at least 70% of nights over a 30 day period. We have 90 days to meet those criteria.            Discussed weight management and the impact of weight gain on sleep apnea.  Let me know if you snore or feel the pressure is too high.    You can get new supplies (mask, hose and filter) for your CPAP every 3-6 months, covered by insurance. You do not need to get supplies that often, but they are available if you would like them.  You may exchange the mask once within the first month if you feel the initial mask does not fit well.  Contact your medical equipment provider for equipment issues.  Please let me know if you have any return of snoring, daytime sleepiness or poor sleep quality. We will want to make sure your CPAP is adequately treating your apnea.  There is a website called CPAP.com that has accessories that may make CPAP use easier. Please visit it at your convenience.  Our phone number is [phone].    Follow-up 2 month.  Bring your CPAP machine with you to the follow up appointment.    Options for help with weight loss:    [place] [facility]  [phone]  [facility], [address]  [place], [address]    [facility]  [phone]  [place] [address]  [facility] First Floor, [address]   [place] [address]   See more at:  [email or web address]    Lifestyle Medicine Program for Weight Management  [facility] [address] [place] [address] Contact [phone] -   See more at: [email or web address]      Your BMI is Body mass index is 46.44 kg/(m^2).  Weight management is a personal decision.  If you are interested in exploring weight loss strategies, the following discussion covers the approaches that may be successful. Body mass index (BMI) is one way to tell whether you are at a healthy weight, overweight, or obese. It measures your weight in relation to your height.  A BMI of 18.5 to 24.9 is in the healthy range. A person with a BMI of 25 to 29.9 is considered overweight, and someone with a BMI of 30 or greater is considered obese. More than two-thirds of American adults are considered overweight or obese.  Being overweight or obese increases the risk for further weight gain. Excess weight may lead to heart disease and diabetes.  Creating and following plans for healthy eating and physical activity may help you improve your health.  Weight control is part of healthy lifestyle and includes exercise, emotional health, and healthy eating habits. Careful eating habits lifelong are the mainstay of weight control. Though there are significant health benefits from weight loss, long-term weight loss with diet alone may be very difficult to achieve- studies show long-term success with dietary management in less than 10% of people. Attaining a healthy weight may be especially difficult to achieve in those with severe obesity. In some cases, medications, devices and surgical management might be considered.  What can you do?  If you are overweight or obese and are interested in methods for  weight loss, you should discuss this with your provider.     Consider reducing daily calorie intake by 500 calories.     Keep a food journal.     Avoiding skipping meals, consider cutting portions instead.    Diet combined with exercise helps maintain muscle while optimizing fat loss. Strength training is particularly important for building and maintaining muscle mass. Exercise helps reduce stress, increase energy, and improves fitness. Increasing exercise without diet control, however, may not burn enough calories to loose weight.       Start walking three days a week 10-20 minutes at a time    Work towards walking thirty minutes five days a week     Eventually, increase the speed of your walking for 1-2 minutes at time    In addition, we recommend that you review healthy lifestyles and methods for weight loss available through the National Institutes of Health patient information sites:  http://win.niddk.nih.gov/publications/index.htm    And look into health and wellness programs that may be available through your health insurance provider, employer, local community center, or zach club.    Weight management plan: Patient was referred to their PCP to discuss a diet and exercise plan.

## 2017-12-08 NOTE — PROGRESS NOTES
Sleep Consultation:    Date on this visit: 12/8/2017    Cristiano Casanova is sent by No ref. provider found for a sleep consultation regarding MERRICK.    Primary Physician: Kiel Georges     Cristiano Casanova reports snoring, interrupted sleep, daytime sleepiness for years. His medical history is significant for MERRICK, HTN, obesity.    He had an initial PSG on 8/17/2010. His AHI was 21/hr, RDI 38.6/hr. His O2 zayra was 80%. He weighed 246 pounds at that time. He had a CPAP titration PSG through [facility] on 6/19/2012, CPAP 12 cm was effective. His weight was 261 at that time. CPAP was prescribed, but he never tried it at home.    In the last 3 months he has been feeling much more exhausted. He has been falling asleep at lunch. His sleepiness does get worse at the end of the week.    Cristiano goes to sleep at 11:00 PM during the week. He wakes up at 6:00 AM with an alarm. Prior to about 3 months ago, he was waking just before his alarm. Since then he has been sleeping through his alarm sometimes. He falls asleep in 10 minutes.  Cristiano denies difficulty falling asleep.  He wakes up 1-2 times a night for 5 minutes before falling back to sleep.  Cristiano wakes up to go to the bathroom and uncertain reasons.  On weekends, Cristiano goes to sleep at 1:00 AM.  He wakes up at 10:00 AM without an alarm. He falls asleep in 10 minutes.  Patient gets an average of 6 hours of sleep per week night and 10 hours on weekends.     Patient does use electronics in bed (10 minutes) and does not watch TV in bed, worry in bed about anything and read in bed.     Cristiano does not do shift work.  He works day shifts. He works construction.  He lives with his brother and roommate.    Cristiano does snore every night and snoring is loud. Patient does not have a regular bed partner. There is report of gasping and snorting. He will wake himself with a snort if he is dozing during a nap. He does have witnessed apneas. Patient sleeps on his stomach mostly, sometimes sides. He has  occasional morning dry mouth, denies shortness of breath at night, morning headaches, morning confusion and restless legs. Cristiano denies any bruxism, sleep walking, sleep talking, dream enactment, sleep paralysis, cataplexy and hypnogogic/hypnopompic hallucinations.    Cristiano has reflux at night (only if he eats right before bed), denies difficulty breathing through his nose, claustrophobia and depression.      Cristiano has gained 40-50 pounds in 5 years.  Patient describes themself as a night person.  He would prefer to go to sleep at 11:00 PM and wake up at 9:00 AM.  Patient's [place] Sleepiness score 19/24 consistent with excessive daytime sleepiness.      Cristiano naps 3 times per week for 30 minutes, feels unrefreshed after naps. He naps after work sometimes.  He denies dozing while driving. Patient was counseled on the importance of driving while alert, to pull over if drowsy, or nap before getting into the vehicle if sleepy.  He uses no caffeine.     Allergies:    Allergies   Allergen Reactions     No Known Drug Allergies        Medications:    Current Outpatient Prescriptions   Medication Sig Dispense Refill     ibuprofen 200 MG capsule        lisinopril-hydrochlorothiazide (PRINZIDE/ZESTORETIC) 20-25 MG per tablet Take 2 tablets by mouth daily 90 tablet 0       Problem List:  Patient Active Problem List    Diagnosis Date Noted     Acute pain of right knee 03/17/2017     Priority: Medium     Aftercare following surgery of the musculoskeletal system 03/17/2017     Priority: Medium     MERRICK (obstructive sleep apnea) 02/21/2017     Priority: Medium     CARDIOVASCULAR SCREENING; LDL GOAL LESS THAN 160 01/20/2012     Priority: Medium     Hypertension goal BP (blood pressure) < 140/90 09/06/2011     Priority: Medium     Obesity 12/28/2010     Priority: Medium     Pes planus 06/12/2006     Priority: Medium     Problem list name updated by automated process. Provider to review       Plantar fascial fibromatosis 06/12/2006      Priority: Medium     Pain in limb 06/12/2006     Priority: Medium        Past Medical/Surgical History:  Past Medical History:   Diagnosis Date     Hypertension      Obesity      Sleep apnea      Past Surgical History:   Procedure Laterality Date     ARTHROSCOPIC RECONSTRUCTION ANTERIOR AND POSTERIOR CRUCIATE LIGAMENT, COMBINED  2009    x2     ARTHROSCOPY KNEE Right 3/1/2017    Procedure: ARTHROSCOPY KNEE;  Surgeon: Abdelrahman Cui MD;  Location: [facility] OR     EYE SURGERY      x3       Social History:  Social History     Social History     Marital status: Single     Spouse name: N/A     Number of children: N/A     Years of education: N/A     Occupational History      Student     [employer]     Social History Main Topics     Smoking status: Current Some Day Smoker     Packs/day: 1.00     Types: Cigarettes     Last attempt to quit: 12/21/2011     Smokeless tobacco: Never Used      Comment: now smoking only 1/2 ppw     Alcohol use 0.0 oz/week     0 Standard drinks or equivalent per week      Comment: 3-4 beers on weekends     Drug use: No     Sexual activity: No     Other Topics Concern     Parent/Sibling W/ Cabg, Mi Or Angioplasty Before 65f 55m? No     Social History Narrative       Family History:  Family History   Problem Relation Age of Onset     Adopted: Yes     Family history unknown: Yes       Review of Systems:  A complete review of systems reviewed by me is negative with the exeption of what has been mentioned in the history of present illness.  CONSTITUTIONAL: NEGATIVE for weight loss, fever, chills, sweats or night sweats, drug allergies.  CONSTITUTIONAL:  POSITIVE for  weight gain  EYES: NEGATIVE for changes in vision, blind spots, double vision.  ENT: NEGATIVE for ear pain, sore throat, sinus pain, post-nasal drip, bloody nose  ENT: POSITIVE for runny nose  CARDIAC: NEGATIVE for chest pain or pressure, swollen legs or swollen feet.  CARDIAC:  POSITIVE for  fast heart  "beats, fluttering in chest, breathlessness when lying flat and HTN  NEUROLOGIC: NEGATIVE headaches, weakness or numbness in the arms or legs.  DERMATOLOGIC: NEGATIVE for rashes, new moles or change in mole(s)  PULMONARY: NEGATIVE dry cough, productive cough, coughing up blood, whistling when breathing.    PULMONARY:  POSITIVE for  SOB at rest, SOB with activity and wheezing   GASTROINTESTINAL: NEGATIVE for nausea or vomitting, loose or watery stools, fat or grease in stools, constipation, abdominal pain, bowel movements black in color or blood noted.  GENITOURINARY: NEGATIVE for pain during urination, blood in urine, urinating more frequently than usual, irregular menstrual periods.  MUSCULOSKELETAL:  POSITIVE for  muscle pain, bone or joint pain and swollen joints  ENDOCRINE: NEGATIVE for diabetes.  ENDOCRINE:  POSITIVE for  increased thirst and increased urination  LYMPHATIC: NEGATIVE for swollen lymph nodes, lumps or bumps in the breasts or nipple discharge.    Physical Examination:  Vitals: /74  Pulse 105  Resp 20  Ht 1.727 m (5' 8\")  Wt (!) 138.5 kg (305 lb 6.4 oz)  SpO2 95%  BMI 46.44 kg/m2  Neck Circumference: 50 cm.        [place] Total Score 12/8/2017   Total score - [place] 19       GENERAL APPEARANCE: healthy, alert, no distress and cooperative  EYES: Eyes grossly normal to inspection, esotropia-right eye, PERRL, conjunctivae and sclerae normal and lids and lashes normal  HENT: nose and mouth without ulcers or lesions, oropharynx small and very crowded, tongue base enlarged and tonsillar hypertrophy  NECK: no adenopathy, no asymmetry, masses, or scars, thyroid normal to palpation and trachea midline and normal to palpation  RESP: lungs clear to auscultation - no rales, rhonchi or wheezes  CV: borderline high rate and normal rhythm, normal S1 S2, no S3 or S4, no murmur, click or rub and no irregular beats  LYMPHATICS: normal ant/post cervical and supraclavicular nodes  MS: extremities normal- " no gross deformities noted  NEURO: Normal strength and tone, mentation intact, speech normal and cranial nerves 2-12 intact  Mallampati Class: IV.  Tonsillar Stage: 3  extending beyond pillars.    Impression/Plan:    (G47.33) MERRICK (obstructive sleep apnea)  (primary encounter diagnosis)  Comment: Mr. Casanova is here for concerns of MERRICK. He had moderate MERRICK diagnosed in 2010 at [facility], AHI 21/hr, RDI 38/hr. His weight was 246. CPAP titration study in 2012 with weight 261 showed CPAP 12 cm was effective in REM supine. He never started treatment. His weight is now 305, BMI 46. He has become more symptomatic, more daytime sleepiness and sleep disruption. He is observed to snore loudly and have pauses in breathing. He has HTN. He has a very small and crowded airway. There is some risk for hypoxemia/hypoventilation due to his weight, but he has a normal SpO2 and CO2 in the daytime and he denies morning headaches, confusion or nocturnal dyspnea.   Plan: Comprehensive DME        Auto CPAP 10-17 cm, heated humidifier and compliance meter. The patient was informed of the mask exchange policy and the compliance goals. He was also informed that he would be followed by the sleep therapy management team during the first month of CPAP use. We discussed that weight loss is likely the best way to reduce/eliminate his apnea. He was given some resources for weight loss and encouraged to discuss this further with his primary. I will screen for hypoxemia with oximetry after he is established on CPAP.      Literature provided regarding CPAP.      He will follow up with me in approximately two months.       Prior polysomnography reviewed.  Obstructive sleep apnea reviewed.  Complications of untreated sleep apnea were reviewed.  45 minutes was spent during this visit, over 50% in counseling and coordination of care.  Bennett Goltz, PA-C    CC: Kiel Georges PA-C

## 2017-12-08 NOTE — PROGRESS NOTES
SUBJECTIVE:   Cristiano Casanova is a 27 year old male who presents to clinic today for the following health issues:    Hypertension Follow-up      Outpatient blood pressures are not being checked.    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  Additional history: as documented    Patient Active Problem List   Diagnosis     Pes planus     Plantar fascial fibromatosis     Pain in limb     Obesity     Hypertension goal BP (blood pressure) < 140/90     CARDIOVASCULAR SCREENING; LDL GOAL LESS THAN 160     MERRICK (obstructive sleep apnea)     Acute pain of right knee     Aftercare following surgery of the musculoskeletal system     Morbid obesity (H)     Past Surgical History:   Procedure Laterality Date     ARTHROSCOPIC RECONSTRUCTION ANTERIOR AND POSTERIOR CRUCIATE LIGAMENT, COMBINED  2009    x2     ARTHROSCOPY KNEE Right 3/1/2017    Procedure: ARTHROSCOPY KNEE;  Surgeon: Abdelrahman Cui MD;  Location: [facility] OR     EYE SURGERY      x3       Social History   Substance Use Topics     Smoking status: Current Some Day Smoker     Packs/day: 1.00     Types: Cigarettes     Last attempt to quit: 12/21/2011      "Smokeless tobacco: Never Used      Comment: now smoking only 1/2 ppw     Alcohol use 0.0 oz/week     0 Standard drinks or equivalent per week      Comment: 3-4 beers on weekends     Family History   Problem Relation Age of Onset     Adopted: Yes     Family history unknown: Yes             Reviewed and updated as needed this visit by clinical staff     Reviewed and updated as needed this visit by Provider         ROS:  Constitutional, HEENT, cardiovascular, pulmonary, gi and gu systems are negative, except as otherwise noted.      OBJECTIVE:   /88 (BP Location: Right arm, Cuff Size: Adult Large)  Pulse 107  Temp 97.5  F (36.4  C) (Oral)  Ht 5' 8\" (1.727 m)  Wt (!) 303 lb (137.4 kg)  SpO2 95%  BMI 46.07 kg/m2  Body mass index is 46.07 kg/(m^2).  GENERAL: healthy, alert and no distress  EYES: Eyes grossly normal to inspection, PERRL and conjunctivae and sclerae normal  RESP: lungs clear to auscultation - no rales, rhonchi or wheezes  CV: regular rates and rhythm, normal S1 S2, no S3 or S4 and no murmur, click or rub  NEURO: Normal strength and tone, mentation intact and speech normal  PSYCH: mentation appears normal, affect normal/bright    Diagnostic Test Results:  none     ASSESSMENT/PLAN:   1. Essential hypertension with goal blood pressure less than 140/90  Uncontrolled today. He did begin taking left over medications this past week but is not at goal. We'll have him start taking twice daily. Monitor for side effects and return in 1 month. Sooner as needed. He'll recheck bp at the pharmacy in 1 week. We'll need to update bmp at that time.  - lisinopril-hydrochlorothiazide (PRINZIDE/ZESTORETIC) 20-25 MG per tablet; Take 2 tablets by mouth daily  Dispense: 180 tablet; Refill: 0    2. MERRICK (obstructive sleep apnea)  Restarting CPAP today. Likely will help BP control as well as energy.     3. Morbid obesity (H)  Reviewed at length. He has gone up nearly 20lbs in the past year. BMI now at 46. This is " certainly mulitfocal: inactivity, diet are major contributors. At this point he does not want to see diet/nutrition but he'll need to consider. He is going to start with routine walking and portion control. Close follow up will be needed and possibly referral.     Kiel Georges PA-C  [facility]

## 2017-12-08 NOTE — PATIENT INSTRUCTIONS
Please check out [email or web address] for the info on eating out!    Start exercising 30 minutes at least 3-4 times weekly; slowly build your time    We'll see you one month

## 2017-12-08 NOTE — NURSING NOTE
"Chief Complaint   Patient presents with     Sleep Problem     Consult, \"Snoring, interrupted sleep, gasping, falling asleep right after meals.\"       Initial /74  Pulse 105  Resp 20  Ht 1.727 m (5' 8\")  Wt (!) 138.5 kg (305 lb 6.4 oz)  SpO2 95%  BMI 46.44 kg/m2 Estimated body mass index is 46.44 kg/(m^2) as calculated from the following:    Height as of this encounter: 1.727 m (5' 8\").    Weight as of this encounter: 138.5 kg (305 lb 6.4 oz).  Medication Reconciliation: complete     ESS 19  Neck 50cm  Aimee Christy    "

## 2017-12-08 NOTE — MR AVS SNAPSHOT
After Visit Summary   12/8/2017    Cristiano Casanova    MRN: [id number]           Patient Information     Date Of Birth          1990        Visit Information        Provider Department      12/8/2017 4:40 PM Kiel Georges PA-C [facility]        Today's Diagnoses     Essential hypertension with goal blood pressure less than 140/90    -  1    MERRICK (obstructive sleep apnea)        Morbid obesity (H)          Care Instructions    Please check out www.heart.org for the info on eating out!    Start exercising 30 minutes at least 3-4 times weekly; slowly build your time    We'll see you one month            Follow-ups after your visit        Your next 10 appointments already scheduled     Feb 09, 2018  4:30 PM CST   Return Sleep Patient with Brady Chua MD   [facility]    [address] [place] [address] [phone]              Who to contact     If you have questions or need follow up information about today's clinic visit or your schedule please contact [facility] directly at [phone].  Normal or non-critical lab and imaging results will be communicated to you by [employer], letter or phone within 4 business days after the clinic has received the results. If you do not hear from us within 7 days, please contact the clinic through [employer] or phone. If you have a critical or abnormal lab result, we will notify you by phone as soon as possible.  Submit refill requests through [employer] or call your pharmacy and they will forward the refill request to us. Please allow 3 business days for your refill to be completed.          Additional Information About Your Visit        [employer] Information     [employer] gives you secure access to your electronic health record. If you see a primary care provider, you can also send messages to your care team and make appointments. If you have  "questions, please call your primary care clinic.  If you do not have a primary care provider, please call [phone] and they will assist you.        Care EveryWhere ID     This is your Care EveryWhere ID. This could be used by other organizations to access your [place] medical records  [id number]        Your Vitals Were     Pulse Temperature Height Pulse Oximetry BMI (Body Mass Index)       107 97.5  F (36.4  C) (Oral) 5' 8\" (1.727 m) 95% 46.07 kg/m2        Blood Pressure from Last 3 Encounters:   12/08/17 152/88   12/08/17 131/79   03/10/17 (!) 138/98    Weight from Last 3 Encounters:   12/08/17 (!) 303 lb (137.4 kg)   12/08/17 (!) 305 lb 6.4 oz (138.5 kg)   03/10/17 280 lb (127 kg)              Today, you had the following     No orders found for display         Where to get your medicines      These medications were sent to [facility] [address] [facility] [address] [place] [address] & [facility]  [address], [facility] [address]     Phone:  [phone]     lisinopril-hydrochlorothiazide 20-25 MG per tablet          Primary Care Provider Office Phone # Fax #    Kiel Georges PA-C [phone] [address] [address]        Equal Access to Services     [facility] AH: Hadii aad ku hadasho Soomaali, waaxda luqadaha, qaybta kaalmada adeegyada, maddy weber . So [facility] [phone].    ATENCIÓN: Si habla español, tiene a ratliff disposición servicios gratuitos de asistencia lingüística. Leslye al [phone].    We comply with applicable federal civil rights laws and [place] laws. We do not discriminate on the basis of race, color, national origin, age, disability, sex, sexual orientation, or gender identity.            Thank you!     Thank you for choosing [facility]  for your care. Our goal is always to provide you with excellent care. Hearing back from our patients is one way we can continue " to improve our services. Please take a few minutes to complete the written survey that you may receive in the mail after your visit with us. Thank you!             Your Updated Medication List - Protect others around you: Learn how to safely use, store and throw away your medicines at www.disposemymeds.org.          This list is accurate as of: 12/8/17  5:17 PM.  Always use your most recent med list.                   Brand Name Dispense Instructions for use Diagnosis    ibuprofen 200 MG capsule           lisinopril-hydrochlorothiazide 20-25 MG per tablet    PRINZIDE/ZESTORETIC    180 tablet    Take 2 tablets by mouth daily    Essential hypertension with goal blood pressure less than 140/90

## 2017-12-08 NOTE — NURSING NOTE
"Chief Complaint   Patient presents with     Hypertension       Initial /88 (BP Location: Right arm, Cuff Size: Adult Large)  Pulse 107  Temp 97.5  F (36.4  C) (Oral)  Ht 5' 8\" (1.727 m)  Wt (!) 303 lb (137.4 kg)  SpO2 95%  BMI 46.07 kg/m2 Estimated body mass index is 46.07 kg/(m^2) as calculated from the following:    Height as of this encounter: 5' 8\" (1.727 m).    Weight as of this encounter: 303 lb (137.4 kg).  Medication Reconciliation: complete   Kd Burks CMA      "

## 2017-12-11 ENCOUNTER — TELEPHONE (OUTPATIENT)
Dept: SLEEP MEDICINE | Facility: CLINIC | Age: 27
End: 2017-12-11

## 2017-12-27 ENCOUNTER — DOCUMENTATION ONLY (OUTPATIENT)
Dept: SLEEP MEDICINE | Facility: CLINIC | Age: 27
End: 2017-12-27
Payer: COMMERCIAL

## 2017-12-27 NOTE — PROGRESS NOTES
Patient was offered choice of vendor and chose [facility].  Patient Cristiano Casanova was set up at [facility] on December 27, 2017. Patient received a Resmed AirSense 10 Auto. Pressures were set at 10-17 cm H2O. Patient s ramp is 5 cm H2O for Auto and FLEX/EPR is EPR is 2. Patient received a [employer] Simplus Full Face mask Size Large, heated tubing and heated humidifier. Patient is enrolled in the STM Program and does not need to meet compliance. Patient has a follow up on 02/09/2018 with Dr. Chua.    IKER JENNINGS

## 2017-12-28 DIAGNOSIS — G47.33 OSA (OBSTRUCTIVE SLEEP APNEA): Primary | ICD-10-CM

## 2018-01-02 ENCOUNTER — DOCUMENTATION ONLY (OUTPATIENT)
Dept: SLEEP MEDICINE | Facility: CLINIC | Age: 28
End: 2018-01-02
Payer: COMMERCIAL

## 2018-01-02 NOTE — PROGRESS NOTES
3 DAY STM VISIT    Patient contacted for 3 day STM visit  Subjective measures:  Patient felling the benefits of CPAP he is not falling asleep anymore during the day.      Device type: Auto-CPAP  PAP settings: CPAP min 10 cm  H20     CPAP max 17 cm  H20       Assessment: Nightly usage over four hours according to patient. Patient gave me his serial numbers.   Action plan: Pt to have f/u 14 day STM visit.  Diagnostic AHI:

## 2018-01-11 ENCOUNTER — DOCUMENTATION ONLY (OUTPATIENT)
Dept: SLEEP MEDICINE | Facility: CLINIC | Age: 28
End: 2018-01-11

## 2018-01-12 NOTE — PROGRESS NOTES
14 DAY STM VISIT    Message left for patient to return call     Assessment: Pt not meeting objective benchmarks for compliance and AHI    Action plan: waiting for patient to return call.  and pt to have 30 day STM visit.    Device type: Auto-CPAP  PAP settings: CPAP min 10 cm  H20     CPAP max 17 cm  H20    95th% pressure 16.9 cm  H20   Objective measures: 14 day rolling measures      Compliance  57 %      Leak  18.12 lpm  last  upload      AHI 6.19   last  upload      Average number of minutes 241       Objective measure goal  Compliance   Goal >70%  Leak   Goal < 24 lpm  AHI  Goal < 5  Usage  Goal >240

## 2018-01-29 ENCOUNTER — DOCUMENTATION ONLY (OUTPATIENT)
Dept: SLEEP MEDICINE | Facility: CLINIC | Age: 28
End: 2018-01-29
Payer: COMMERCIAL

## 2018-01-29 NOTE — PROGRESS NOTES
30 DAY STM VISIT    Subjective measures:   Patient stated things going well and is not as tired during the day anymore.     Assessment: Pt not meeting objective benchmarks for compliance  Patient meeting subjective benchmarks.   Action plan: 2 week STM recheck appt scheduled.  Patient has a follow up visit with Dr. Chua on 2/9/2018.   Device type: Auto-CPAP  PAP settings: CPAP min 10 cm  H20     CPAP max 17 cm  H20    95th% pressure 16.7 cm  H20   Objective measures: 14 day rolling measures      Compliance  42 %      Leak  15 lpm  last  upload      AHI 3.53   last  upload      Average number of minutes 152        Objective measure goal  Compliance   Goal >70%  Leak   Goal < 24 lpm  AHI  Goal < 5  Usage  Goal >240

## 2018-02-09 ENCOUNTER — OFFICE VISIT (OUTPATIENT)
Dept: SLEEP MEDICINE | Facility: CLINIC | Age: 28
End: 2018-02-09
Payer: COMMERCIAL

## 2018-02-09 VITALS
DIASTOLIC BLOOD PRESSURE: 94 MMHG | WEIGHT: 295 LBS | SYSTOLIC BLOOD PRESSURE: 146 MMHG | RESPIRATION RATE: 18 BRPM | OXYGEN SATURATION: 97 % | HEART RATE: 105 BPM | BODY MASS INDEX: 44.71 KG/M2 | HEIGHT: 68 IN

## 2018-02-09 DIAGNOSIS — G47.33 OSA (OBSTRUCTIVE SLEEP APNEA): Primary | ICD-10-CM

## 2018-02-09 PROCEDURE — 99214 OFFICE O/P EST MOD 30 MIN: CPT | Performed by: FAMILY MEDICINE

## 2018-02-09 NOTE — PROGRESS NOTES
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 The patient has been treated with APAP at 10 - 17 cmH2O. Unfortunately, the patient has not been able to be compliant due to various reasons. However, he assures me that he will be using the PAP therapy moving forward. The device is effective at treating the condition with a residual AHI of 3.2 events per hour. Therefore, the patient will be continued on current therapy for now until the next visit in about 6 weeks. If the download still shows a rather elevated 95th percentile pressure, the APAP will be adjusted to accommodate this high pressure. Before the follow up visit, the patient will complete an overnight oximetry to evaluate for hypoventilation syndrome. If this is normal, no further sleep testing would be necessary for now. However, if this suggests hypoventilation, an in-lab PSG sleep study would be necessary. Today, the nature and pathophysiology of MERRICK were discussed. The different treatment options for MERRICK were also reviewed and explained today. Lifestyle recommendations including healthy dietary and exercising habits were discussed.    This patient's evaluation showed elevated BP today. Education and counseling regarding elevated BP were given. The effects of elevated BP were discussed. The patient was advised to monitor his BP and keep a BP diary / log. The  "patient will follow up with his PCP for further management.    Patient understands and agrees with assessment and plan. All questions and concerns were addressed.    Pt was asked to not operate any heavy machinery, work at heights, or engage in life-threatening activities if she feels sleepy or \"drowsy.\"     Total of 30 minutes was spent in face to face contact with patient with > 50% in counseling and coordination of care.  Options for treatment and/or follow-up care were reviewed with the patient. Cristiano SALINAS Edilberto was engaged and actively involved in the decision making process. He verbalized understanding of the options discussed and was satisfied with the final plan.    RTC in 4 weeks for follow up of MERRICK (or sooner if develops new or worsening symptoms).    Thank you for allowing me participate in the care of Cristiano Casanova.         Reason for Follow Up:   CC: This patient returns for an MERRICK follow up visit.         History of Present Illness:     27 year old male pt presents for a follow up visit of MERRICK. This patient was diagnosed in the past with moderate MERRICK. He was last evaluated by Provider Goltz in 2017. This patient completed a PSG sleep study in 2010 that demonstrated moderate MERRICK with an AHI of 21 events per hour, RDI of 38.6 events per hour, and SpO2 zayra of 80%. In 2012, the patient had a PSG titration study and he was started on therapy.    The patient has been using an APAP at 10 - 17 cmH2O. The PAP download today shows a non-compliant patient who is using the device 50% of the time with 43% over 4 hours. The device is effective at treating the condition with a residual AHI of 3.2 events per hour. The patient 95th percentile pressure is 16.5 cmH2O. No significant leaks noted with a 95th percentile at 14.6 L/min.    The patient explains that he likes the PAP therapy and he reports improved EDS with PAP device. He states that he was unable to be compliant recently due to a recent travel and some URI. " However, he explains that now he is doing better and he feels ready to get back on the device. When using the PAP therapy, the patient denies any aerophagia, bloating, CP, SOB, or any other sxs or concerns. The patient denies any leaks.    CPAP Compliance:   Dates: February 13, 2018       43 % >4hour use    Average Use: 5 hours 48 minutes   Leak: 14.6    Residual AHI: 3.2 events per hour         Allergies:     Allergies   Allergen Reactions     No Known Drug Allergies           Past Medical History:     Past Medical History:   Diagnosis Date     Hypertension      Obesity      Sleep apnea            Past Surgical History:     Past Surgical History:   Procedure Laterality Date     ARTHROSCOPIC RECONSTRUCTION ANTERIOR AND POSTERIOR CRUCIATE LIGAMENT, COMBINED  2009    x2     ARTHROSCOPY KNEE Right 3/1/2017    Procedure: ARTHROSCOPY KNEE;  Surgeon: Abdelrahman Cui MD;  Location: [facility] OR     EYE SURGERY      x3           Social History:     Social History     Social History     Marital status: Single     Spouse name: N/A     Number of children: N/A     Years of education: N/A     Occupational History      Student     [employer]     Social History Main Topics     Smoking status: Current Some Day Smoker     Packs/day: 1.00     Types: Cigarettes     Last attempt to quit: 12/21/2011     Smokeless tobacco: Never Used      Comment: now smoking only 1/2 ppw     Alcohol use 0.0 oz/week     0 Standard drinks or equivalent per week      Comment: 3-4 beers on weekends     Drug use: No     Sexual activity: No     Other Topics Concern     Parent/Sibling W/ Cabg, Mi Or Angioplasty Before 65f 55m? No     Social History Narrative      Smoking:   Social History   Substance Use Topics     Smoking status: Current Some Day Smoker     Packs/day: 1.00     Types: Cigarettes     Last attempt to quit: 12/21/2011     Smokeless tobacco: Never Used      Comment: now smoking only 1/2 ppw     Alcohol use 0.0  oz/week     0 Standard drinks or equivalent per week      Comment: 3-4 beers on weekends     -Illicit drugs: None Reported  -Alcohol intake: Occasional use only         Family History:     Family History   Problem Relation Age of Onset     Adopted: Yes     Family history unknown: Yes           Immunization:     Immunization History   Administered Date(s) Administered     DTAP (<7y) 1992, 01/06/1993, 02/03/1993, 12/20/1993, 03/25/1997     HPV9 06/24/2017, 07/22/2017     Hib (PRP-T) 08/12/1993, 12/20/1993     Influenza (H1N1) 12/22/2009     Influenza (IIV3) PF 09/21/2009, 11/24/2010, 09/06/2011, 09/14/2012, 09/25/2013     Influenza Vaccine IM 3yrs+ 4 Valent IIV4 09/09/2015, 09/15/2016, 09/11/2017     MMR 12/20/1993, 03/25/1997     Meningococcal (Menomune ) 02/11/2009     Poliovirus, inactivated (IPV) 1992, 01/06/1993, 02/03/1993, 12/20/1993, 03/25/1997     TD (ADULT, 7+) 07/01/2004     TDAP Vaccine (Adacel) 10/11/2013             Medications:     Current Outpatient Prescriptions   Medication Sig     lisinopril-hydrochlorothiazide (PRINZIDE/ZESTORETIC) 20-25 MG per tablet Take 2 tablets by mouth daily     ibuprofen 200 MG capsule      No current facility-administered medications for this visit.              Review of Systems:   I have done 14 points of review systems and no obvious new pertinent findings reported.    General: no fever, chills, weakness  HENT: No loss of hearing, vertigo, ear ringing, sore throat, epistaxis  EYES: Diplopia, blurry vision, photophobia.  Pulmonary: No dyspnea, wheezing, cough, sputum, hemoptysis, chest pain  Sleep: No EDS, snoring, insomnia, cataplexy, restless legs, REM behavior   CVS: No chest discomfort, palpitations  GI: No diarrhea, constipation, dysphagia, early satiety, bleeding  Renal: No pain on urination, hematuria, freq micturation  Musculoskeletal: No muscle ache, joint pain, swelling  Skin: No rash, ecchymosis, itching, icterus  Neurology: No tingling, weakness,  "numbness  Heme: No easy bruisibility or bleeding  Allergy: runny nose, sneezing, urticeria  Psychiatry: no change in mood and affect         Physical Examination:   BP (!) 146/94  Pulse 105  Resp 18  Ht 1.727 m (5' 8\")  Wt 133.8 kg (295 lb)  SpO2 97%  BMI 44.85 kg/m2     VS: Reviewed and elevated BP noted.  General: Alert, oriented, not in distress. Dressed casually; Good eye contact; Comfortably sitting in a chair; in no apparent distress  HEENT: Normocephalic and atraumatic; NL TM x 2; pupils are isocoric and equally responsive to the light. PERRLA. EOMI. Normal fundoscopic examination; Nasal turbinates are normal with a normal septal alignment;  Mallampati score: Grade III; Tonsillar hypertrophy: 2  visible at pillars; Pharynx with no erythema or exudates.  Lungs: Both hemithoraces are symmetrical, normal to palpation, no dullness to percussion, auscultation of lungs revealed normal breath sounds with no expirium prolongation, wheezing, rhonci and crackles.  CVS: Normal S1 and S2 heart sounds with no extra heart sounds. No murmur, rubs, or clicks. Normal peripheral pulses throughout with no obvious peripheral edema.  Psychiatry: Mood and affect are appropriate. Euthymic with affect congruent with full range and intensity. No SI/HI with adequate insight and judgement.    Brady Chua MD, MPH  Sleep Medicine Clinic    [facility] [address], [place], [place] [address] [phone] Clinic    Total time spent with patient: 30 min. Over >50% of the time was spent for face to face counseling, education, and evaluation.      "

## 2018-02-09 NOTE — NURSING NOTE
"Chief Complaint   Patient presents with     RECHECK     f/u cpap       Initial BP (!) 146/94  Pulse 105  Resp 18  Ht 1.727 m (5' 8\")  Wt 133.8 kg (295 lb)  SpO2 97%  BMI 44.85 kg/m2 Estimated body mass index is 44.85 kg/(m^2) as calculated from the following:    Height as of this encounter: 1.727 m (5' 8\").    Weight as of this encounter: 133.8 kg (295 lb).  Medication Reconciliation: complete       Sarah Valladares LPN/BERHANE  "

## 2018-02-09 NOTE — MR AVS SNAPSHOT
After Visit Summary   2/9/2018    Cristiano Casanova    MRN: [id number]           Patient Information     Date Of Birth          1990        Visit Information        Provider Department      2/9/2018 4:30 PM Brady Chua MD [facility]        Today's Diagnoses     MERRICK (obstructive sleep apnea)    -  1    Class 3 obesity due to excess calories with serious comorbidity and body mass index (BMI) of 40.0 to 44.9 in adult (H)          Care Instructions            Your BMI is Body mass index is 44.85 kg/(m^2).  Weight management is a personal decision.  If you are interested in exploring weight loss strategies, the following discussion covers the approaches that may be successful. Body mass index (BMI) is one way to tell whether you are at a healthy weight, overweight, or obese. It measures your weight in relation to your height.  A BMI of 18.5 to 24.9 is in the healthy range. A person with a BMI of 25 to 29.9 is considered overweight, and someone with a BMI of 30 or greater is considered obese. More than two-thirds of American adults are considered overweight or obese.  Being overweight or obese increases the risk for further weight gain. Excess weight may lead to heart disease and diabetes.  Creating and following plans for healthy eating and physical activity may help you improve your health.  Weight control is part of healthy lifestyle and includes exercise, emotional health, and healthy eating habits. Careful eating habits lifelong are the mainstay of weight control. Though there are significant health benefits from weight loss, long-term weight loss with diet alone may be very difficult to achieve- studies show long-term success with dietary management in less than 10% of people. Attaining a healthy weight may be especially difficult to achieve in those with severe obesity. In some cases, medications, devices and surgical management might be considered.  What can you do?  If  you are overweight or obese and are interested in methods for weight loss, you should discuss this with your provider.     Consider reducing daily calorie intake by 500 calories.     Keep a food journal.     Avoiding skipping meals, consider cutting portions instead.    Diet combined with exercise helps maintain muscle while optimizing fat loss. Strength training is particularly important for building and maintaining muscle mass. Exercise helps reduce stress, increase energy, and improves fitness. Increasing exercise without diet control, however, may not burn enough calories to loose weight.       Start walking three days a week 10-20 minutes at a time    Work towards walking thirty minutes five days a week     Eventually, increase the speed of your walking for 1-2 minutes at time    In addition, we recommend that you review healthy lifestyles and methods for weight loss available through the National Institutes of Health patient information sites:  http://win.niddk.nih.gov/publications/index.htm    And look into health and wellness programs that may be available through your health insurance provider, employer, local community center, or zach club.    Weight management plan: Patient was referred to their PCP to discuss a diet and exercise plan.       Your blood pressure was checked while you were in clinic today.  Please read the guidelines below about what these numbers mean and what you should do about them.  Your systolic blood pressure is the top number.  This is the pressure when the heart is pumping.  Your diastolic blood pressure is the bottom number.  This is the pressure in between beats.  If your systolic blood pressure is less than 120 and your diastolic blood pressure is less than 80, then your blood pressure is normal. There is nothing more that you need to do about it  If your systolic blood pressure is 120-139 or your diastolic blood pressure is 80-89, your blood pressure may be higher than it  should be.  You should have your blood pressure re-checked within a year by a primary care provider.  If your systolic blood pressure is 140 or greater or your diastolic blood pressure is 90 or greater, you may have high blood pressure.  High blood pressure is treatable, but if left untreated over time it can put you at risk for heart attack, stroke, or kidney failure.  You should have your blood pressure re-checked by a primary care provider within the next four weeks.    1. CPAP-  WHAT DOES IT DO AND HOW CAN I LEARN TO WEAR IT?                               BEFORE I START, CAN I WATCH A MOVIE TO GET A PLAN ON HOW TO USE CPAP?  https://www.[employer].com/watch?n=v6N25ys646G      Continuous positive airway pressure, or CPAP, is the most effective treatment for obstructive sleep apnea. It works by blowing room air, through a mask, to hold your throat open. A decision to use CPAP is a major step forward in the pursuit of a healthier life. The successful use of CPAP will help you breathe easier, sleep better and live healthier. You can choose CPAP equipment from any durable medical equipment provider that meets your needs.  Using CPAP can be a positive experience if you keep these rubi points in mind:  1. Commitment  CPAP is not a quick fix for your problem. It involves a long-term commitment to improve your sleep and your health.    2. Communication  Stay in close communication with both your sleep doctor and your CPAP supplier. Ask lots of questions and seek help when you need it.    3. Consistency  Use CPAP all night, every night and for every nap. You will receive the maximum health benefits from CPAP when you use it every time that you sleep. This will also make it easier for your body to adjust to the treatment.    4. Correction  The first machine and mask that you try may not be the best ones for you. Work with your sleep doctor and your CPAP supplier to make corrections to your equipment selection. Ask about trying  "a different type of machine or mask if you have ongoing problems. Make sure that your mask is a good fit and learn to use your equipment properly.    5. Challenge  Tell a family member or close friend to ask you each morning if you used your CPAP the previous night. Have someone to challenge you to give it your best effort.    6. Connection   Your adjustment to CPAP will be easier if you are able to connect with others who use the same treatment. Ask your sleep doctor if there is a support group in your area for people who have sleep apnea, or look for one on the Internet.  7. Comfort   Increase your level of comfort by using a saline spray, decongestant or heated humidifier if CPAP irritates your nose, mouth or throat. Use your unit's \"ramp\" setting to slowly get used to the air pressure level. There may be soft pads you can buy that will fit over your mask straps. Look on www.CPAP.com for accessories that can help make CPAP use more comfortable.  8. Cleaning   Clean your mask, tubing and headgear on a regular basis. Put this time in your schedule so that you don't forget to do it. Check and replace the filters for your CPAP unit and humidifier.    9. Completion   Although you are never finished with CPAP therapy, you should reward yourself by celebrating the completion of your first month of treatment. Expect this first month to be your hardest period of adjustment. It will involve some trial and error as you find the machine, mask and pressure settings that are right for you.    10. Continuation  After your first month of treatment, continue to make a daily commitment to use your CPAP all night, every night and for every nap.    CPAP-Tips to starting with success:  Begin using your CPAP for short periods of time during the day while you watch TV or read.    Use CPAP every night and for every nap. Using it less often reduces the health benefits and makes it harder for your body to get used to it.    Make small " adjustments to your mask, tubing, straps and headgear until you get the right fit. Tightening the mask may actually worsen the leak.  If it leaves significant marks on your face or irritates the bridge of your nose, it may not be the best mask for you.  Speak with the person who supplied the mask and consider trying other masks. Insurances will allow you to try different masks during the first month of starting CPAP.  Insurance also covers a new mask, hose and filter about every 6 months.    Use a saline nasal spray to ease mild nasal congestion. Neti-Pot or saline nasal rinses may also help. Nasal gel sprays can help reduce nasal dryness.  Biotene mouthwash can be helpful to protect your teeth if you experience frequent dry mouth.  Dry mouth may be a sign of air escaping out of your mouth or out of the mask in the case of a full face mask.  Speak with your provider if you expect that is the case.     Take a nasal decongestant to relieve more severe nasal or sinus congestion.  Do not use Afrin (oxymetazoline) nasal spray more than 3 days in a row.  Speak with your sleep doctor if your nasal congestion is chronic.    Use a heated humidifier that fits your CPAP model to enhance your breathing comfort. Adjust the heat setting up if you get a dry nose or throat, down if you get condensation in the hose or mask.  Position the CPAP lower than you so that any condensation in the hose drains back into the machine rather than towards the mask.    Try a system that uses nasal pillows if traditional masks give you problems.    Clean your mask, tubing and headgear once a week. Make sure the equipment dries fully.    Regularly check and replace the filters for your CPAP unit and humidifier.    Work closely with your sleep provider and your CPAP supplier to make sure that you have the machine, mask and air pressure setting that works best for you. It is better to stop using it and call your provider to solve problems than to lay  awake all night frustrated with the device.    Daytime naps are not advised, but use the PAP device if taking naps. Many insurances require that we prove you are using the PAP device at least 4 hours on at least 70% of nights over a 30 day period. We have 90 days to meet those criteria.    You can get new supplies (mask, hose and filter) for your device every 3-6 months (covered by insurance). You do not need to get supplies that often, but they are available if you would like them. You may exchange the mask once within the first month if you feel the initial mask does not fit well. Please, contact your medical equipment provider for equipment issues.    Please let me know if you have any snoring, daytime sleepiness, or poor sleep quality. We will want to make sure your PAP device is adequately treating your condition.    There is a website called CPAP.com that has accessories that may make CPAP use easier. Please visit it at your convenience.    Please, schedule sleep study and follow up visit with the nurse (she will coming into the room and meet with you).     Thank you!    Brady Chua MD, MPH  Clinical Sleep and Occupational / Environmental Medicine                Follow-ups after your visit        Future tests that were ordered for you today     Open Future Orders        Priority Expected Expires Ordered    Overnight oximetry study Routine  8/8/2018 2/9/2018            Who to contact     If you have questions or need follow up information about today's clinic visit or your schedule please contact [facility] directly at [phone].  Normal or non-critical lab and imaging results will be communicated to you by MyChart, letter or phone within 4 business days after the clinic has received the results. If you do not hear from us within 7 days, please contact the clinic through MyChart or phone. If you have a critical or abnormal lab result, we will notify you by phone as soon as  "possible.  Submit refill requests through [employer] or call your pharmacy and they will forward the refill request to us. Please allow 3 business days for your refill to be completed.          Additional Information About Your Visit        [email or web address] Information     [employer] gives you secure access to your electronic health record. If you see a primary care provider, you can also send messages to your care team and make appointments. If you have questions, please call your primary care clinic.  If you do not have a primary care provider, please call [phone] and they will assist you.        Care EveryWhere ID     This is your Care EveryWhere ID. This could be used by other organizations to access your [facility] medical records  [id number]        Your Vitals Were     Pulse Respirations Height Pulse Oximetry BMI (Body Mass Index)       105 18 1.727 m (5' 8\") 97% 44.85 kg/m2        Blood Pressure from Last 3 Encounters:   02/09/18 (!) 146/94   12/08/17 152/88   12/08/17 131/79    Weight from Last 3 Encounters:   02/09/18 133.8 kg (295 lb)   12/08/17 (!) 137.4 kg (303 lb)   12/08/17 (!) 138.5 kg (305 lb 6.4 oz)               Primary Care Provider Office Phone # Fax #    Kiel Georges PA-C [phone] [id number] [facility] [id number]        Equal Access to Services     [facility]: Hadii aad ku hadasho Soomaali, waaxda luqadaha, qaybta kaalmada adeegyada, maddy humphries haysara weber . So [facility] [phone].    ATENCIÓN: Si habla español, tiene a ratliff disposición servicios gratuitos de asistencia lingüística. Leslye al [phone].    We comply with applicable federal civil rights laws and [place] laws. We do not discriminate on the basis of race, color, national origin, age, disability, sex, sexual orientation, or gender identity.            Thank you!     [place] you [place] [facility]  [place]. Our goal is always to provide you with " excellent care. Hearing back from our patients is one way we can continue to improve our services. Please take a few minutes to complete the written survey that you may receive in the mail after your visit with us. Thank you!             Your Updated Medication List - Protect others around you: Learn how to safely use, store and throw away your medicines at www.disposemymeds.org.          This list is accurate as of 2/9/18  4:53 PM.  Always use your most recent med list.                   Brand Name Dispense Instructions for use Diagnosis    ibuprofen 200 MG capsule           lisinopril-hydrochlorothiazide 20-25 MG per tablet    PRINZIDE/ZESTORETIC    180 tablet    Take 2 tablets by mouth daily    Essential hypertension with goal blood pressure less than 140/90

## 2018-02-09 NOTE — PATIENT INSTRUCTIONS
Your BMI is Body mass index is 44.85 kg/(m^2).  Weight management is a personal decision.  If you are interested in exploring weight loss strategies, the following discussion covers the approaches that may be successful. Body mass index (BMI) is one way to tell whether you are at a healthy weight, overweight, or obese. It measures your weight in relation to your height.  A BMI of 18.5 to 24.9 is in the healthy range. A person with a BMI of 25 to 29.9 is considered overweight, and someone with a BMI of 30 or greater is considered obese. More than two-thirds of [place] adults are considered overweight or obese.  Being overweight or obese increases the risk for further weight gain. Excess weight may lead to heart disease and diabetes.  Creating and following plans for healthy eating and physical activity may help you improve your health.  Weight control is part of healthy lifestyle and includes exercise, emotional health, and healthy eating habits. Careful eating habits lifelong are the mainstay of weight control. Though there are significant health benefits from weight loss, long-term weight loss with diet alone may be very difficult to achieve- studies show long-term success with dietary management in less than 10% of people. Attaining a healthy weight may be especially difficult to achieve in those with severe obesity. In some cases, medications, devices and surgical management might be considered.  What can you do?  If you are overweight or obese and are interested in methods for weight loss, you should discuss this with your provider.     Consider reducing daily calorie intake by 500 calories.     Keep a food journal.     Avoiding skipping meals, consider cutting portions instead.    Diet combined with exercise helps maintain muscle while optimizing fat loss. Strength training is particularly important for building and maintaining muscle mass. Exercise helps reduce stress, increase energy, and improves  fitness. Increasing exercise without diet control, however, may not burn enough calories to loose weight.       Start walking three days a week 10-20 minutes at a time    Work towards walking thirty minutes five days a week     Eventually, increase the speed of your walking for 1-2 minutes at time    In addition, we recommend that you review healthy lifestyles and methods for weight loss available through the National Institutes of Health patient information sites:  http://win.niddk.nih.gov/publications/index.htm    And look into health and wellness programs that may be available through your health insurance provider, employer, local community center, or zach club.    Weight management plan: Patient was referred to their PCP to discuss a diet and exercise plan.       Your blood pressure was checked while you were in clinic today.  Please read the guidelines below about what these numbers mean and what you should do about them.  Your systolic blood pressure is the top number.  This is the pressure when the heart is pumping.  Your diastolic blood pressure is the bottom number.  This is the pressure in between beats.  If your systolic blood pressure is less than 120 and your diastolic blood pressure is less than 80, then your blood pressure is normal. There is nothing more that you need to do about it  If your systolic blood pressure is 120-139 or your diastolic blood pressure is 80-89, your blood pressure may be higher than it should be.  You should have your blood pressure re-checked within a year by a primary care provider.  If your systolic blood pressure is 140 or greater or your diastolic blood pressure is 90 or greater, you may have high blood pressure.  High blood pressure is treatable, but if left untreated over time it can put you at risk for heart attack, stroke, or kidney failure.  You should have your blood pressure re-checked by a primary care provider within the next four weeks.    1. CPAP-  WHAT DOES  IT DO AND HOW CAN I LEARN TO WEAR IT?                               BEFORE I START, CAN I WATCH A MOVIE TO GET A PLAN ON HOW TO USE CPAP?  https://www.youtube.com/watch?o=d8M44vd591Z      Continuous positive airway pressure, or CPAP, is the most effective treatment for obstructive sleep apnea. It works by blowing room air, through a mask, to hold your throat open. A decision to use CPAP is a major step forward in the pursuit of a healthier life. The successful use of CPAP will help you breathe easier, sleep better and live healthier. You can choose CPAP equipment from any durable medical equipment provider that meets your needs.  Using CPAP can be a positive experience if you keep these rubi points in mind:  1. Commitment  CPAP is not a quick fix for your problem. It involves a long-term commitment to improve your sleep and your health.    2. Communication  Stay in close communication with both your sleep doctor and your CPAP supplier. Ask lots of questions and seek help when you need it.    3. Consistency  Use CPAP all night, every night and for every nap. You will receive the maximum health benefits from CPAP when you use it every time that you sleep. This will also make it easier for your body to adjust to the treatment.    4. Correction  The first machine and mask that you try may not be the best ones for you. Work with your sleep doctor and your CPAP supplier to make corrections to your equipment selection. Ask about trying a different type of machine or mask if you have ongoing problems. Make sure that your mask is a good fit and learn to use your equipment properly.    5. Challenge  Tell a family member or close friend to ask you each morning if you used your CPAP the previous night. Have someone to challenge you to give it your best effort.    6. Connection   Your adjustment to CPAP will be easier if you are able to connect with others who use the same treatment. Ask your sleep doctor if there is a support  "group in your area for people who have sleep apnea, or look for one on the Internet.  7. Comfort   Increase your level of comfort by using a saline spray, decongestant or heated humidifier if CPAP irritates your nose, mouth or throat. Use your unit's \"ramp\" setting to slowly get used to the air pressure level. There may be soft pads you can buy that will fit over your mask straps. Look on www.CPAP.com for accessories that can help make CPAP use more comfortable.  8. Cleaning   Clean your mask, tubing and headgear on a regular basis. Put this time in your schedule so that you don't forget to do it. Check and replace the filters for your CPAP unit and humidifier.    9. Completion   Although you are never finished with CPAP therapy, you should reward yourself by celebrating the completion of your first month of treatment. Expect this first month to be your hardest period of adjustment. It will involve some trial and error as you find the machine, mask and pressure settings that are right for you.    10. Continuation  After your first month of treatment, continue to make a daily commitment to use your CPAP all night, every night and for every nap.    CPAP-Tips to starting with success:  Begin using your CPAP for short periods of time during the day while you watch TV or read.    Use CPAP every night and for every nap. Using it less often reduces the health benefits and makes it harder for your body to get used to it.    Make small adjustments to your mask, tubing, straps and headgear until you get the right fit. Tightening the mask may actually worsen the leak.  If it leaves significant marks on your face or irritates the bridge of your nose, it may not be the best mask for you.  Speak with the person who supplied the mask and consider trying other masks. Insurances will allow you to try different masks during the first month of starting CPAP.  Insurance also covers a new mask, hose and filter about every 6 " months.    Use a saline nasal spray to ease mild nasal congestion. Neti-Pot or saline nasal rinses may also help. Nasal gel sprays can help reduce nasal dryness.  Biotene mouthwash can be helpful to protect your teeth if you experience frequent dry mouth.  Dry mouth may be a sign of air escaping out of your mouth or out of the mask in the case of a full face mask.  Speak with your provider if you expect that is the case.     Take a nasal decongestant to relieve more severe nasal or sinus congestion.  Do not use Afrin (oxymetazoline) nasal spray more than 3 days in a row.  Speak with your sleep doctor if your nasal congestion is chronic.    Use a heated humidifier that fits your CPAP model to enhance your breathing comfort. Adjust the heat setting up if you get a dry nose or throat, down if you get condensation in the hose or mask.  Position the CPAP lower than you so that any condensation in the hose drains back into the machine rather than towards the mask.    Try a system that uses nasal pillows if traditional masks give you problems.    Clean your mask, tubing and headgear once a week. Make sure the equipment dries fully.    Regularly check and replace the filters for your CPAP unit and humidifier.    Work closely with your sleep provider and your CPAP supplier to make sure that you have the machine, mask and air pressure setting that works best for you. It is better to stop using it and call your provider to solve problems than to lay awake all night frustrated with the device.    Daytime naps are not advised, but use the PAP device if taking naps. Many insurances require that we prove you are using the PAP device at least 4 hours on at least 70% of nights over a 30 day period. We have 90 days to meet those criteria.    You can get new supplies (mask, hose and filter) for your device every 3-6 months (covered by insurance). You do not need to get supplies that often, but they are available if you would like  them. You may exchange the mask once within the first month if you feel the initial mask does not fit well. Please, contact your medical equipment provider for equipment issues.    Please let me know if you have any snoring, daytime sleepiness, or poor sleep quality. We will want to make sure your PAP device is adequately treating your condition.    There is a website called CPAP.com that has accessories that may make CPAP use easier. Please visit it at your convenience.    Please, schedule sleep study and follow up visit with the nurse (she will coming into the room and meet with you).     Thank you!    Brady Chua MD, MPH  Clinical Sleep and Occupational / Environmental Medicine

## 2018-02-14 ENCOUNTER — DOCUMENTATION ONLY (OUTPATIENT)
Dept: SLEEP MEDICINE | Facility: CLINIC | Age: 28
End: 2018-02-14
Payer: COMMERCIAL

## 2018-02-28 ENCOUNTER — DOCUMENTATION ONLY (OUTPATIENT)
Dept: SLEEP MEDICINE | Facility: CLINIC | Age: 28
End: 2018-02-28
Payer: COMMERCIAL

## 2018-03-01 ENCOUNTER — OFFICE VISIT (OUTPATIENT)
Dept: SLEEP MEDICINE | Facility: CLINIC | Age: 28
End: 2018-03-01
Payer: COMMERCIAL

## 2018-03-01 DIAGNOSIS — G47.33 OSA (OBSTRUCTIVE SLEEP APNEA): ICD-10-CM

## 2018-03-01 NOTE — PROGRESS NOTES
Patient picked up oximeter and was instructed on use. They showed understanding by demonstrating it back.

## 2018-03-01 NOTE — MR AVS SNAPSHOT
After Visit Summary   3/1/2018    Cristiano Casanova    MRN: [id number]           Patient Information     Date Of Birth          1990        Visit Information        Provider Department      3/1/2018 4:30 PM  SLEEP LAB [facility]        Today's Diagnoses     Class 3 obesity due to excess calories with serious comorbidity and body mass index (BMI) of 40.0 to 44.9 in adult (H)        MERRICK (obstructive sleep apnea)           Follow-ups after your visit        Your next 10 appointments already scheduled     Mar 01, 2018  4:30 PM CST   Oximetry  with  SLEEP LAB   [facility] ([facility])    [address]  [facility] [address]   [phone]            Mar 02, 2018  4:00 PM CST   Oximetry Drop Off with  SLEEP DME   [facility] ([facility])    [address]  [facility] [address]   [phone]            Mar 13, 2018  4:00 PM CDT   Return Sleep Patient with Brady Chua MD   [facility] ([facility])    [address]  [facility] [address]   [phone]              Who to contact     If you have questions or need follow up information about today's clinic visit or your schedule please contact [facility] directly at [phone].  Normal or non-critical lab and imaging results will be communicated to you by MyChart, letter or phone within 4 business days after the clinic has received the results. If you do not hear from us within 7 days, please contact the clinic through MyChart or phone. If you have a critical or abnormal lab result, we will notify you by phone as soon as possible.  Submit refill requests through [employer] or call your pharmacy and they will forward the refill request to us. Please allow 3 business days for your refill to be  completed.          Additional Information About Your Visit        [employer] Information     nivio gives you secure access to your electronic health record. If you see a primary care provider, you can also send messages to your care team and make appointments. If you have questions, please call your primary care clinic.  If you do not have a primary care provider, please call [phone] and they will assist you.        Care EveryWhere ID     This is your Care EveryWhere ID. This could be used by other organizations to access your [facility] medical records  [id number]         Blood Pressure from Last 3 Encounters:   02/09/18 (!) 146/94   12/08/17 152/88   12/08/17 131/79    Weight from Last 3 Encounters:   02/09/18 133.8 kg (295 lb)   12/08/17 (!) 137.4 kg (303 lb)   12/08/17 (!) 138.5 kg (305 lb 6.4 oz)              We Performed the Following     Overnight oximetry study        Primary Care Provider Office Phone # Fax #    Kiel Georges PA-C [phone]       [address] [facility] [address]        Equal Access to Services     [facility] : Hadii aad ku hadasho Soomaali, waaxda luqadaha, qaybta kaalmada adeegyada, maddy humphries haysara weber . So [facility] [phone].    ATENCIÓN: Si habla español, tiene a ratliff disposición servicios gratuitos de asistencia lingüística. [facility] [phone].    We comply with applicable federal civil rights laws and [place] laws. We do not discriminate on the basis of race, color, national origin, age, disability, sex, sexual orientation, or gender identity.            Thank you!     Thank you for choosing [facility]  for your care. Our goal is always to provide you with excellent care. Hearing back from our patients is one way we can continue to improve our services. Please take a few minutes to complete the written survey that you may receive in the mail after your visit with us. Thank you!             Your Updated  Medication List - Protect others around you: Learn how to safely use, store and throw away your medicines at www.disposemymeds.org.          This list is accurate as of 3/1/18  4:23 PM.  Always use your most recent med list.                   Brand Name Dispense Instructions for use Diagnosis    ibuprofen 200 MG capsule           lisinopril-hydrochlorothiazide 20-25 MG per tablet    PRINZIDE/ZESTORETIC    180 tablet    Take 2 tablets by mouth daily    Essential hypertension with goal blood pressure less than 140/90

## 2018-03-02 ENCOUNTER — DOCUMENTATION ONLY (OUTPATIENT)
Dept: SLEEP MEDICINE | Facility: CLINIC | Age: 28
End: 2018-03-02
Payer: COMMERCIAL

## 2018-03-13 ENCOUNTER — OFFICE VISIT (OUTPATIENT)
Dept: SLEEP MEDICINE | Facility: CLINIC | Age: 28
End: 2018-03-13
Payer: COMMERCIAL

## 2018-03-13 VITALS
RESPIRATION RATE: 15 BRPM | SYSTOLIC BLOOD PRESSURE: 145 MMHG | WEIGHT: 290 LBS | HEART RATE: 107 BPM | BODY MASS INDEX: 43.95 KG/M2 | OXYGEN SATURATION: 97 % | DIASTOLIC BLOOD PRESSURE: 93 MMHG | HEIGHT: 68 IN

## 2018-03-13 DIAGNOSIS — G47.33 OSA (OBSTRUCTIVE SLEEP APNEA): Primary | ICD-10-CM

## 2018-03-13 PROCEDURE — 99214 OFFICE O/P EST MOD 30 MIN: CPT | Performed by: FAMILY MEDICINE

## 2018-03-13 NOTE — PROCEDURES
SLEEP STUDY INTERPRETATION  HOME OXIMETRY      Patient: Cristiano Casanova  MRN: [id number]  YOB: 1990  Study Date: 03/05/2018  Referring Physician: Kiel Georges  Ordering Physician: Brady Chua MD     Indications: Moderate MERRICK with obesity    Comments: The overnight oximetry was performed in the usual protocol. The study was performed on room air with CPAP without oxygen. There was no technical artifact (% artifact 0.9%). The baseline oxygen saturation was 96.3%.       Testing Conditions:  CPAP 10 - 17 cmH2O - Room air  Quality: artifact-free    Duration of Data Collection:              6 hours              Time less than or equal to SpO2 88% [5 min]:           3.5 min  Duration monitoring [> 2 hours artifact free]:   6 hours                    4% O2 desat index [ > 15/ hour]:     5.6 / hour                    Lowest SpO2 (O2 Saturation):                                    77%  Average Pulse Rate:                                                    65.4 BPM    Desaturation Pattern:     normal                                  Assessment:   -Normal study    Recommendations:  -Continue current PAP therapy without oxygen supplementation at current settings.    Diagnosis Code(s):     -Hypoxemia G47.36    -Sleep apnea G47.33     Brady Chua MD, MPH  Clinical Sleep and Occupational / Environmental Medicine  March 13, 2018

## 2018-03-13 NOTE — NURSING NOTE
"Chief Complaint   Patient presents with     RECHECK     f/u cpap with Oximeter       Initial BP (!) 145/93  Pulse 107  Resp 15  Ht 1.727 m (5' 8\")  Wt 131.5 kg (290 lb)  SpO2 97%  BMI 44.09 kg/m2 Estimated body mass index is 44.09 kg/(m^2) as calculated from the following:    Height as of this encounter: 1.727 m (5' 8\").    Weight as of this encounter: 131.5 kg (290 lb).  Medication Reconciliation: complete         Sarah Valladares LPN/BERHANE  "

## 2018-03-13 NOTE — PROGRESS NOTES
[facility]  Sleep Medicine Follow Up Note  March 13, 2018      Cristiano Casanova MRN# [id number]   Age: 27 year old YOB: 1990     Date of Follow Up Visit: March 13, 2018    Primary care provider: Kiel Georges     History taken from: Patient     Cristiano Casanova is a 27 year old male seen in the Sleep Clinic  for   Chief Complaint   Patient presents with     RECHECK     f/u cpap with Oximeter          Assessment and Plan:   Diagnoses:  (1) Moderate MERRICK (AHI of 21 events per hour)    Discussion:  (1) Moderate MERRICK: The patient was diagnosed in the past with moderate MERRICK with an AHI of 21 events per hour. The patient has been treated with APAP at 10 - 17 cmH2O. The patient has been able to be compliant, but he missed a few days of use due to personal reasons. Pt assures me that he will be using the PAP therapy moving forward (he will try to use it in front of his girlfriend as well). The device is effective at treating the condition with a residual AHI of 1.9 events per hour. The patient recently completed a overnight oximetry with PAP device in place. This showed no sleep associated hypoxemia. At this point, no further sleep testing would be necessary. The patient will be continued with APAP at 10 - 17 cmH2O. Lifestyle recommendations including healthy dietary and exercising habits were discussed. The patient will follow up with me within one year.    This patient's evaluation showed elevated BP today. Education and counseling regarding elevated BP were given. The effects of elevated BP were discussed. The patient was advised to monitor his BP and keep a BP diary / log. The patient will follow up with his PCP for further management.    Patient understands and agrees with assessment and plan. All questions and concerns were addressed.    Pt was asked to not operate any heavy machinery, work at heights, or engage in life-threatening activities if she feels sleepy or  "\"drowsy.\"     Total of 30 minutes was spent in face to face contact with patient with > 50% in counseling and coordination of care.  Options for treatment and/or follow-up care were reviewed with the patient. Cristiano Casanova was engaged and actively involved in the decision making process. He verbalized understanding of the options discussed and was satisfied with the final plan.    RTC in one year for follow up of MERRICK (or sooner if develops new or worsening symptoms).    Thank you for allowing me participate in the care of Cristiano Casanova.         Reason for Follow Up:   CC: This patient returns for an MERRICK follow up visit.         History of Present Illness:     27 year old male pt presents for a follow up visit of MERRICK. This patient was diagnosed in the past with moderate MERRICK. He was last evaluated by Provider Goltz in 2017. This patient completed a PSG sleep study in 2010 that demonstrated moderate MERRICK with an AHI of 21 events per hour, RDI of 38.6 events per hour, and SpO2 zayra of 80%. In 2012, the patient had a PSG titration study and he was started on therapy.    The patient has been using an APAP at 10 - 17 cmH2O. The PAP download today shows a compliant patient who is using the device 77% of the time with 70% over 4 hours. The device is effective at treating the condition with a residual AHI of 1.9 events per hour. The patient 95th percentile pressure is 15.7 cmH2O. No significant leaks noted with a 95th percentile at 18.5 L/min.    Once again today, the patient explains that he likes the PAP therapy and he reports improved EDS with PAP device. He states that he missed a few nights of use because he spent the night with his girlfriend. However, he explains that when using the PAP therapy, the patient denies any aerophagia, bloating, CP, SOB, or any other sxs or concerns. The patient denies any leaks or integumentary discomfort (pt using a full face interface). Pt reports improved EDS as well as improved sleep " quality. The patient denies any snoring, gasping / choking for air, or witnessed apneas with PAP therapy.    CPAP Compliance:   Dates: March 13, 2018       70 % >4hour use    Average Use: 6 hours 12 minutes   Leak: 18.5 L/min   Residual AHI: 1.9 events per hour    The patient completed an overnight oximetry with the CPAP in place and this showed no sleep associated hypoxemia or any other abnormalities.         Allergies:     Allergies   Allergen Reactions     No Known Drug Allergies           Past Medical History:     Past Medical History:   Diagnosis Date     Hypertension      Obesity      Sleep apnea            Past Surgical History:     Past Surgical History:   Procedure Laterality Date     ARTHROSCOPIC RECONSTRUCTION ANTERIOR AND POSTERIOR CRUCIATE LIGAMENT, COMBINED  2009    x2     ARTHROSCOPY KNEE Right 3/1/2017    Procedure: ARTHROSCOPY KNEE;  Surgeon: Abdelrahman Cui MD;  Location: [facility] OR     EYE SURGERY      x3           Social History:     Social History     Social History     Marital status: Single     Spouse name: N/A     Number of children: N/A     Years of education: N/A     Occupational History      Student     [employer]     Social History Main Topics     Smoking status: Current Some Day Smoker     Packs/day: 1.00     Types: Cigarettes     Last attempt to quit: 12/21/2011     Smokeless tobacco: Never Used      Comment: now smoking only 1/2 ppw     Alcohol use 0.0 oz/week     0 Standard drinks or equivalent per week      Comment: 3-4 beers on weekends     Drug use: No     Sexual activity: No     Other Topics Concern     Parent/Sibling W/ Cabg, Mi Or Angioplasty Before 65f 55m? No     Social History Narrative      Smoking:   Social History   Substance Use Topics     Smoking status: Current Some Day Smoker     Packs/day: 1.00     Types: Cigarettes     Last attempt to quit: 12/21/2011     Smokeless tobacco: Never Used      Comment: now smoking only 1/2 ppw      Alcohol use 0.0 oz/week     0 Standard drinks or equivalent per week      Comment: 3-4 beers on weekends     -Illicit drugs: None Reported  -Alcohol intake: Occasional use only         Family History:     Family History   Problem Relation Age of Onset     Adopted: Yes     Family history unknown: Yes           Immunization:     Immunization History   Administered Date(s) Administered     DTAP (<7y) 1992, 01/06/1993, 02/03/1993, 12/20/1993, 03/25/1997     HPV9 06/24/2017, 07/22/2017     Hib (PRP-T) 08/12/1993, 12/20/1993     Influenza (H1N1) 12/22/2009     Influenza (IIV3) PF 09/21/2009, 11/24/2010, 09/06/2011, 09/14/2012, 09/25/2013     Influenza Vaccine IM 3yrs+ 4 Valent IIV4 09/09/2015, 09/15/2016, 09/11/2017     MMR 12/20/1993, 03/25/1997     Meningococcal (Menomune ) 02/11/2009     Poliovirus, inactivated (IPV) 1992, 01/06/1993, 02/03/1993, 12/20/1993, 03/25/1997     TD (ADULT, 7+) 07/01/2004     TDAP Vaccine (Adacel) 10/11/2013             Medications:     Current Outpatient Prescriptions   Medication Sig     lisinopril-hydrochlorothiazide (PRINZIDE/ZESTORETIC) 20-25 MG per tablet Take 2 tablets by mouth daily     ibuprofen 200 MG capsule      No current facility-administered medications for this visit.              Review of Systems:   I have done 14 points of review systems and no obvious new pertinent findings reported.    General: no fever, chills, weakness  HENT: No loss of hearing, vertigo, ear ringing, sore throat, epistaxis  EYES: Diplopia, blurry vision, photophobia.  Pulmonary: No dyspnea, wheezing, cough, sputum, hemoptysis, chest pain  Sleep: No EDS, snoring, insomnia, cataplexy, restless legs, REM behavior   CVS: No chest discomfort, palpitations  GI: No diarrhea, constipation, dysphagia, early satiety, bleeding  Renal: No pain on urination, hematuria, freq micturation  Musculoskeletal: No muscle ache, joint pain, swelling  Skin: No rash, ecchymosis, itching, icterus  Neurology: No  "tingling, weakness, numbness  Heme: No easy bruisibility or bleeding  Allergy: runny nose, sneezing, urticeria  Psychiatry: no change in mood and affect         Physical Examination:   BP (!) 145/93  Pulse 107  Resp 15  Ht 1.727 m (5' 8\")  Wt 131.5 kg (290 lb)  SpO2 97%  BMI 44.09 kg/m2     VS: Reviewed and elevated BP noted.  General: Alert, oriented, not in distress. Dressed casually; Good eye contact; Comfortably sitting in a chair; in no apparent distress  HEENT: Normocephalic and atraumatic; NL TM x 2; pupils are isocoric and equally responsive to the light. PERRLA. EOMI. Normal fundoscopic examination; Nasal turbinates are normal with a normal septal alignment;  Mallampati score: Grade III; Tonsillar hypertrophy: 2  visible at pillars; Pharynx with no erythema or exudates.  Lungs: Both hemithoraces are symmetrical, normal to palpation, no dullness to percussion, auscultation of lungs revealed normal breath sounds with no expirium prolongation, wheezing, rhonci and crackles.  CVS: Normal S1 and S2 heart sounds with no extra heart sounds. No murmur, rubs, or clicks. Normal peripheral pulses throughout with no obvious peripheral edema.  Psychiatry: Mood and affect are appropriate. Euthymic with affect congruent with full range and intensity. No SI/HI with adequate insight and judgement.    Brady Chua MD, MPH  Sleep Medicine Clinic    [facility] [address], [place], [place] [address] [phone] Clinic    Total time spent with patient: 30 min. Over >50% of the time was spent for face to face counseling, education, and evaluation.  "

## 2018-03-13 NOTE — MR AVS SNAPSHOT
After Visit Summary   3/13/2018    Cristiano Casanova    MRN: [id number]           Patient Information     Date Of Birth          1990        Visit Information        Provider Department      3/13/2018 4:00 PM Brady Chua MD [facility] [place]        Care Instructions          Your BMI is Body mass index is 44.09 kg/(m^2).  Weight management is a personal decision.  If you are interested in exploring weight loss strategies, the following discussion covers the approaches that may be successful. Body mass index (BMI) is one way to tell whether you are at a healthy weight, overweight, or obese. It measures your weight in relation to your height.  A BMI of 18.5 to 24.9 is in the healthy range. A person with a BMI of 25 to 29.9 is considered overweight, and someone with a BMI of 30 or greater is considered obese. More than two-thirds of American adults are considered overweight or obese.  Being overweight or obese increases the risk for further weight gain. Excess weight may lead to heart disease and diabetes.  Creating and following plans for healthy eating and physical activity may help you improve your health.  Weight control is part of healthy lifestyle and includes exercise, emotional health, and healthy eating habits. Careful eating habits lifelong are the mainstay of weight control. Though there are significant health benefits from weight loss, long-term weight loss with diet alone may be very difficult to achieve- studies show long-term success with dietary management in less than 10% of people. Attaining a healthy weight may be especially difficult to achieve in those with severe obesity. In some cases, medications, devices and surgical management might be considered.  What can you do?  If you are overweight or obese and are interested in methods for weight loss, you should discuss this with your provider.     Consider reducing daily calorie intake by 500 calories.     Keep a  food journal.     Avoiding skipping meals, consider cutting portions instead.    Diet combined with exercise helps maintain muscle while optimizing fat loss. Strength training is particularly important for building and maintaining muscle mass. Exercise helps reduce stress, increase energy, and improves fitness. Increasing exercise without diet control, however, may not burn enough calories to loose weight.       Start walking three days a week 10-20 minutes at a time    Work towards walking thirty minutes five days a week     Eventually, increase the speed of your walking for 1-2 minutes at time    In addition, we recommend that you review healthy lifestyles and methods for weight loss available through the National Institutes of Health patient information sites:  http://win.niddk.nih.gov/publications/index.htm    And look into health and wellness programs that may be available through your health insurance provider, employer, local community center, or zach club.    Weight management plan: Patient was referred to their PCP to discuss a diet and exercise plan.     Your blood pressure was checked while you were in clinic today.  Please read the guidelines below about what these numbers mean and what you should do about them.  Your systolic blood pressure is the top number.  This is the pressure when the heart is pumping.  Your diastolic blood pressure is the bottom number.  This is the pressure in between beats.  If your systolic blood pressure is less than 120 and your diastolic blood pressure is less than 80, then your blood pressure is normal. There is nothing more that you need to do about it  If your systolic blood pressure is 120-139 or your diastolic blood pressure is 80-89, your blood pressure may be higher than it should be.  You should have your blood pressure re-checked within a year by a primary care provider.  If your systolic blood pressure is 140 or greater or your diastolic blood pressure is 90 or  greater, you may have high blood pressure.  High blood pressure is treatable, but if left untreated over time it can put you at risk for heart attack, stroke, or kidney failure.  You should have your blood pressure re-checked by a primary care provider within the next four weeks.    1. CPAP-  WHAT DOES IT DO AND HOW CAN I LEARN TO WEAR IT?                               BEFORE I START, CAN I WATCH A MOVIE TO GET A PLAN ON HOW TO USE CPAP?  https://www.iAdvize.com/watch?o=u1Y49jc095T      Continuous positive airway pressure, or CPAP, is the most effective treatment for obstructive sleep apnea. It works by blowing room air, through a mask, to hold your throat open. A decision to use CPAP is a major step forward in the pursuit of a healthier life. The successful use of CPAP will help you breathe easier, sleep better and live healthier. You can choose CPAP equipment from any durable medical equipment provider that meets your needs.  Using CPAP can be a positive experience if you keep these rubi points in mind:  1. Commitment  CPAP is not a quick fix for your problem. It involves a long-term commitment to improve your sleep and your health.    2. Communication  Stay in close communication with both your sleep doctor and your CPAP supplier. Ask lots of questions and seek help when you need it.    3. Consistency  Use CPAP all night, every night and for every nap. You will receive the maximum health benefits from CPAP when you use it every time that you sleep. This will also make it easier for your body to adjust to the treatment.    4. Correction  The first machine and mask that you try may not be the best ones for you. Work with your sleep doctor and your CPAP supplier to make corrections to your equipment selection. Ask about trying a different type of machine or mask if you have ongoing problems. Make sure that your mask is a good fit and learn to use your equipment properly.    5. Challenge  Tell a family member or close  "friend to ask you each morning if you used your CPAP the previous night. Have someone to challenge you to give it your best effort.    6. Connection   Your adjustment to CPAP will be easier if you are able to connect with others who use the same treatment. Ask your sleep doctor if there is a support group in your area for people who have sleep apnea, or look for one on the Internet.  7. Comfort   Increase your level of comfort by using a saline spray, decongestant or heated humidifier if CPAP irritates your nose, mouth or throat. Use your unit's \"ramp\" setting to slowly get used to the air pressure level. There may be soft pads you can buy that will fit over your mask straps. Look on www.CPAP.com for accessories that can help make CPAP use more comfortable.  8. Cleaning   Clean your mask, tubing and headgear on a regular basis. Put this time in your schedule so that you don't forget to do it. Check and replace the filters for your CPAP unit and humidifier.    9. Completion   Although you are never finished with CPAP therapy, you should reward yourself by celebrating the completion of your first month of treatment. Expect this first month to be your hardest period of adjustment. It will involve some trial and error as you find the machine, mask and pressure settings that are right for you.    10. Continuation  After your first month of treatment, continue to make a daily commitment to use your CPAP all night, every night and for every nap.    CPAP-Tips to starting with success:  Begin using your CPAP for short periods of time during the day while you watch TV or read.    Use CPAP every night and for every nap. Using it less often reduces the health benefits and makes it harder for your body to get used to it.    Make small adjustments to your mask, tubing, straps and headgear until you get the right fit. Tightening the mask may actually worsen the leak.  If it leaves significant marks on your face or irritates the " bridge of your nose, it may not be the best mask for you.  Speak with the person who supplied the mask and consider trying other masks. Insurances will allow you to try different masks during the first month of starting CPAP.  Insurance also covers a new mask, hose and filter about every 6 months.    Use a saline nasal spray to ease mild nasal congestion. Neti-Pot or saline nasal rinses may also help. Nasal gel sprays can help reduce nasal dryness.  Biotene mouthwash can be helpful to protect your teeth if you experience frequent dry mouth.  Dry mouth may be a sign of air escaping out of your mouth or out of the mask in the case of a full face mask.  Speak with your provider if you expect that is the case.     Take a nasal decongestant to relieve more severe nasal or sinus congestion.  Do not use Afrin (oxymetazoline) nasal spray more than 3 days in a row.  Speak with your sleep doctor if your nasal congestion is chronic.    Use a heated humidifier that fits your CPAP model to enhance your breathing comfort. Adjust the heat setting up if you get a dry nose or throat, down if you get condensation in the hose or mask.  Position the CPAP lower than you so that any condensation in the hose drains back into the machine rather than towards the mask.    Try a system that uses nasal pillows if traditional masks give you problems.    Clean your mask, tubing and headgear once a week. Make sure the equipment dries fully.    Regularly check and replace the filters for your CPAP unit and humidifier.    Work closely with your sleep provider and your CPAP supplier to make sure that you have the machine, mask and air pressure setting that works best for you. It is better to stop using it and call your provider to solve problems than to lay awake all night frustrated with the device.    Daytime naps are not advised, but use the PAP device if taking naps. Many insurances require that we prove you are using the PAP device at least 4  hours on at least 70% of nights over a 30 day period. We have 90 days to meet those criteria.    You can get new supplies (mask, hose and filter) for your device every 3-6 months (covered by insurance). You do not need to get supplies that often, but they are available if you would like them. You may exchange the mask once within the first month if you feel the initial mask does not fit well. Please, contact your medical equipment provider for equipment issues.    Please let me know if you have any snoring, daytime sleepiness, or poor sleep quality. We will want to make sure your PAP device is adequately treating your condition.    There is a website called CPAP.com that has accessories that may make CPAP use easier. Please visit it at your convenience.    Follow up with me within one year!    Brady Chua MD, MPH  Clinical Sleep and Occupational / Environmental Medicine              Follow-ups after your visit        Who to contact     If you have questions or need follow up information about today's clinic visit or your schedule please contact [facility] directly at [phone].  Normal or non-critical lab and imaging results will be communicated to you by [employer], letter or phone within 4 business days after the clinic has received the results. If you do not hear from us within 7 days, please contact the clinic through [employer] or phone. If you have a critical or abnormal lab result, we will notify you by phone as soon as possible.  Submit refill requests through [employer] or call your pharmacy and they will forward the refill request to us. Please allow 3 business days for your refill to be completed.          Additional Information About Your Visit        Red[employer] Information     [employer] gives you secure access to your electronic health record. If you see a primary care provider, you can also send messages to your care team and make appointments. If you have questions, please call your  "primary care clinic.  If you do not have a primary care provider, please call [phone] and they will assist you.        Care EveryWhere ID     This is your Care EveryWhere ID. This could be used by other organizations to access your [place] medical records  [id number]        Your Vitals Were     Pulse Respirations Height Pulse Oximetry BMI (Body Mass Index)       107 15 1.727 m (5' 8\") 97% 44.09 kg/m2        Blood Pressure from Last 3 Encounters:   03/13/18 (!) 145/93   02/09/18 (!) 146/94   12/08/17 152/88    Weight from Last 3 Encounters:   03/13/18 131.5 kg (290 lb)   02/09/18 133.8 kg (295 lb)   12/08/17 (!) 137.4 kg (303 lb)              Today, you had the following     No orders found for display       Primary Care Provider Office Phone # Fax #    Kiel Georges PA-C [phone]       [address]  [facility] [address]        Equal Access to Services     [facility]: Hadii aad ku hadasho Soomaali, waaxda luqadaha, qaybta kaalmada adeegyada, waxstefania humphries haysara weber . So [facility] [phone].    ATENCIÓN: Si habla español, tiene a ratliff disposición servicios gratuitos de asistencia lingüística. Llame al [phone].    We comply with applicable federal civil rights laws and [place] laws. We do not discriminate on the basis of race, color, national origin, age, disability, sex, sexual orientation, or gender identity.            Thank you!     Thank you for choosing [facility]  for your care. Our goal is always to provide you with excellent care. Hearing back from our patients is one way we can continue to improve our services. Please take a few minutes to complete the written survey that you may receive in the mail after your visit with us. Thank you!             Your Updated Medication List - Protect others around you: Learn how to safely use, store and throw away your medicines at www.disposemymeds.org.          This list is accurate as of " 3/13/18  4:20 PM.  Always use your most recent med list.                   Brand Name Dispense Instructions for use Diagnosis    ibuprofen 200 MG capsule           lisinopril-hydrochlorothiazide 20-25 MG per tablet    PRINZIDE/ZESTORETIC    180 tablet    Take 2 tablets by mouth daily    Essential hypertension with goal blood pressure less than 140/90

## 2018-03-13 NOTE — PATIENT INSTRUCTIONS
Your BMI is Body mass index is 44.09 kg/(m^2).  Weight management is a personal decision.  If you are interested in exploring weight loss strategies, the following discussion covers the approaches that may be successful. Body mass index (BMI) is one way to tell whether you are at a healthy weight, overweight, or obese. It measures your weight in relation to your height.  A BMI of 18.5 to 24.9 is in the healthy range. A person with a BMI of 25 to 29.9 is considered overweight, and someone with a BMI of 30 or greater is considered obese. More than two-thirds of American adults are considered overweight or obese.  Being overweight or obese increases the risk for further weight gain. Excess weight may lead to heart disease and diabetes.  Creating and following plans for healthy eating and physical activity may help you improve your health.  Weight control is part of healthy lifestyle and includes exercise, emotional health, and healthy eating habits. Careful eating habits lifelong are the mainstay of weight control. Though there are significant health benefits from weight loss, long-term weight loss with diet alone may be very difficult to achieve- studies show long-term success with dietary management in less than 10% of people. Attaining a healthy weight may be especially difficult to achieve in those with severe obesity. In some cases, medications, devices and surgical management might be considered.  What can you do?  If you are overweight or obese and are interested in methods for weight loss, you should discuss this with your provider.     Consider reducing daily calorie intake by 500 calories.     Keep a food journal.     Avoiding skipping meals, consider cutting portions instead.    Diet combined with exercise helps maintain muscle while optimizing fat loss. Strength training is particularly important for building and maintaining muscle mass. Exercise helps reduce stress, increase energy, and improves  fitness. Increasing exercise without diet control, however, may not burn enough calories to loose weight.       Start walking three days a week 10-20 minutes at a time    Work towards walking thirty minutes five days a week     Eventually, increase the speed of your walking for 1-2 minutes at time    In addition, we recommend that you review healthy lifestyles and methods for weight loss available through the National Institutes of Health patient information sites:  http://win.niddk.nih.gov/publications/index.htm    And look into health and wellness programs that may be available through your health insurance provider, employer, local community center, or zach club.    Weight management plan: Patient was referred to their PCP to discuss a diet and exercise plan.     Your blood pressure was checked while you were in clinic today.  Please read the guidelines below about what these numbers mean and what you should do about them.  Your systolic blood pressure is the top number.  This is the pressure when the heart is pumping.  Your diastolic blood pressure is the bottom number.  This is the pressure in between beats.  If your systolic blood pressure is less than 120 and your diastolic blood pressure is less than 80, then your blood pressure is normal. There is nothing more that you need to do about it  If your systolic blood pressure is 120-139 or your diastolic blood pressure is 80-89, your blood pressure may be higher than it should be.  You should have your blood pressure re-checked within a year by a primary care provider.  If your systolic blood pressure is 140 or greater or your diastolic blood pressure is 90 or greater, you may have high blood pressure.  High blood pressure is treatable, but if left untreated over time it can put you at risk for heart attack, stroke, or kidney failure.  You should have your blood pressure re-checked by a primary care provider within the next four weeks.    1. CPAP-  WHAT DOES IT  DO AND HOW CAN I LEARN TO WEAR IT?                               BEFORE I START, CAN I WATCH A MOVIE TO GET A PLAN ON HOW TO USE CPAP?  https://www.youtube.com/watch?o=t3W81oe522I      Continuous positive airway pressure, or CPAP, is the most effective treatment for obstructive sleep apnea. It works by blowing room air, through a mask, to hold your throat open. A decision to use CPAP is a major step forward in the pursuit of a healthier life. The successful use of CPAP will help you breathe easier, sleep better and live healthier. You can choose CPAP equipment from any durable medical equipment provider that meets your needs.  Using CPAP can be a positive experience if you keep these rubi points in mind:  1. Commitment  CPAP is not a quick fix for your problem. It involves a long-term commitment to improve your sleep and your health.    2. Communication  Stay in close communication with both your sleep doctor and your CPAP supplier. Ask lots of questions and seek help when you need it.    3. Consistency  Use CPAP all night, every night and for every nap. You will receive the maximum health benefits from CPAP when you use it every time that you sleep. This will also make it easier for your body to adjust to the treatment.    4. Correction  The first machine and mask that you try may not be the best ones for you. Work with your sleep doctor and your CPAP supplier to make corrections to your equipment selection. Ask about trying a different type of machine or mask if you have ongoing problems. Make sure that your mask is a good fit and learn to use your equipment properly.    5. Challenge  Tell a family member or close friend to ask you each morning if you used your CPAP the previous night. Have someone to challenge you to give it your best effort.    6. Connection   Your adjustment to CPAP will be easier if you are able to connect with others who use the same treatment. Ask your sleep doctor if there is a support group  "in your area for people who have sleep apnea, or look for one on the Internet.  7. Comfort   Increase your level of comfort by using a saline spray, decongestant or heated humidifier if CPAP irritates your nose, mouth or throat. Use your unit's \"ramp\" setting to slowly get used to the air pressure level. There may be soft pads you can buy that will fit over your mask straps. Look on www.CPAP.com for accessories that can help make CPAP use more comfortable.  8. Cleaning   Clean your mask, tubing and headgear on a regular basis. Put this time in your schedule so that you don't forget to do it. Check and replace the filters for your CPAP unit and humidifier.    9. Completion   Although you are never finished with CPAP therapy, you should reward yourself by celebrating the completion of your first month of treatment. Expect this first month to be your hardest period of adjustment. It will involve some trial and error as you find the machine, mask and pressure settings that are right for you.    10. Continuation  After your first month of treatment, continue to make a daily commitment to use your CPAP all night, every night and for every nap.    CPAP-Tips to starting with success:  Begin using your CPAP for short periods of time during the day while you watch TV or read.    Use CPAP every night and for every nap. Using it less often reduces the health benefits and makes it harder for your body to get used to it.    Make small adjustments to your mask, tubing, straps and headgear until you get the right fit. Tightening the mask may actually worsen the leak.  If it leaves significant marks on your face or irritates the bridge of your nose, it may not be the best mask for you.  Speak with the person who supplied the mask and consider trying other masks. Insurances will allow you to try different masks during the first month of starting CPAP.  Insurance also covers a new mask, hose and filter about every 6 months.    Use a " saline nasal spray to ease mild nasal congestion. Neti-Pot or saline nasal rinses may also help. Nasal gel sprays can help reduce nasal dryness.  Biotene mouthwash can be helpful to protect your teeth if you experience frequent dry mouth.  Dry mouth may be a sign of air escaping out of your mouth or out of the mask in the case of a full face mask.  Speak with your provider if you expect that is the case.     Take a nasal decongestant to relieve more severe nasal or sinus congestion.  Do not use Afrin (oxymetazoline) nasal spray more than 3 days in a row.  Speak with your sleep doctor if your nasal congestion is chronic.    Use a heated humidifier that fits your CPAP model to enhance your breathing comfort. Adjust the heat setting up if you get a dry nose or throat, down if you get condensation in the hose or mask.  Position the CPAP lower than you so that any condensation in the hose drains back into the machine rather than towards the mask.    Try a system that uses nasal pillows if traditional masks give you problems.    Clean your mask, tubing and headgear once a week. Make sure the equipment dries fully.    Regularly check and replace the filters for your CPAP unit and humidifier.    Work closely with your sleep provider and your CPAP supplier to make sure that you have the machine, mask and air pressure setting that works best for you. It is better to stop using it and call your provider to solve problems than to lay awake all night frustrated with the device.    Daytime naps are not advised, but use the PAP device if taking naps. Many insurances require that we prove you are using the PAP device at least 4 hours on at least 70% of nights over a 30 day period. We have 90 days to meet those criteria.    You can get new supplies (mask, hose and filter) for your device every 3-6 months (covered by insurance). You do not need to get supplies that often, but they are available if you would like them. You may  exchange the mask once within the first month if you feel the initial mask does not fit well. Please, contact your medical equipment provider for equipment issues.    Please let me know if you have any snoring, daytime sleepiness, or poor sleep quality. We will want to make sure your PAP device is adequately treating your condition.    There is a website called CPAP.com that has accessories that may make CPAP use easier. Please visit it at your convenience.    Follow up with me within one year!    Brady Chua MD, MPH  Clinical Sleep and Occupational / Environmental Medicine

## 2018-03-14 ENCOUNTER — TELEPHONE (OUTPATIENT)
Dept: FAMILY MEDICINE | Facility: CLINIC | Age: 28
End: 2018-03-14

## 2018-03-14 ENCOUNTER — DOCUMENTATION ONLY (OUTPATIENT)
Dept: SLEEP MEDICINE | Facility: CLINIC | Age: 28
End: 2018-03-14
Payer: COMMERCIAL

## 2018-03-14 DIAGNOSIS — I10 ESSENTIAL HYPERTENSION WITH GOAL BLOOD PRESSURE LESS THAN 140/90: Primary | ICD-10-CM

## 2018-03-14 NOTE — TELEPHONE ENCOUNTER
This patient is well over due for blood pressure and lab check (non fasting). Please call him and help him schedule. I am sure he should be out (or nearly out) of medication at this point.

## 2018-03-19 NOTE — TELEPHONE ENCOUNTER
2nd attempt, called patient and LM to return call to clinic to schedule appt.   Janis Mueller MA

## 2018-04-10 ENCOUNTER — OFFICE VISIT (OUTPATIENT)
Dept: FAMILY MEDICINE | Facility: CLINIC | Age: 28
End: 2018-04-10
Payer: COMMERCIAL

## 2018-04-10 VITALS
RESPIRATION RATE: 19 BRPM | HEART RATE: 101 BPM | TEMPERATURE: 98.6 F | DIASTOLIC BLOOD PRESSURE: 90 MMHG | OXYGEN SATURATION: 97 % | BODY MASS INDEX: 46.75 KG/M2 | SYSTOLIC BLOOD PRESSURE: 152 MMHG | WEIGHT: 308.5 LBS | HEIGHT: 68 IN

## 2018-04-10 DIAGNOSIS — I10 ESSENTIAL HYPERTENSION WITH GOAL BLOOD PRESSURE LESS THAN 140/90: Primary | ICD-10-CM

## 2018-04-10 DIAGNOSIS — E66.01 MORBID OBESITY (H): ICD-10-CM

## 2018-04-10 DIAGNOSIS — G47.33 OSA (OBSTRUCTIVE SLEEP APNEA): ICD-10-CM

## 2018-04-10 PROCEDURE — 99214 OFFICE O/P EST MOD 30 MIN: CPT | Performed by: PHYSICIAN ASSISTANT

## 2018-04-10 RX ORDER — LISINOPRIL AND HYDROCHLOROTHIAZIDE 20; 25 MG/1; MG/1
2 TABLET ORAL DAILY
Qty: 180 TABLET | Refills: 0 | Status: SHIPPED | OUTPATIENT
Start: 2018-04-10 | End: 2018-10-22

## 2018-04-10 RX ORDER — AMLODIPINE BESYLATE 5 MG/1
5 TABLET ORAL AT BEDTIME
Qty: 90 TABLET | Refills: 0 | Status: SHIPPED | OUTPATIENT
Start: 2018-04-10 | End: 2018-10-22

## 2018-04-10 NOTE — PROGRESS NOTES
SUBJECTIVE:   Cristiano Casanova is a 27 year old male who presents to clinic today for the following health issues:    Hypertension Follow-up      Outpatient blood pressures are not being checked.    Low Salt Diet: no added salt    Amount of exercise or physical activity: 20 minute work 1-2 times per week     Problems taking medications regularly: No    Medication side effects: none    Diet: regular (no restrictions)    -Patient presents to follow up on htn, weight, etc  -He did start using CPAP again  -He is wearing nightly; tolerating well  -he has started to walk more, but has been off of work so overall activity is down  -trying to eat right; bringing lunch     -has not smoked in two weeks  -still with some urges during work but going well cold turkey  -people at work are supportive    -denies any chest pain or headaches  -no gross shortness of breath     -these changes took a few months to kick in  -eventually got sick of it all  -GF is helping a lot with motivation      Problem list and histories reviewed & adjusted, as indicated.  Additional history: as documented    Patient Active Problem List   Diagnosis     Pes planus     Plantar fascial fibromatosis     Pain in limb     Obesity     Hypertension goal BP (blood pressure) < 140/90     CARDIOVASCULAR SCREENING; LDL GOAL LESS THAN 160     MERRICK (obstructive sleep apnea)     Acute pain of right knee     Aftercare following surgery of the musculoskeletal system     Morbid obesity (H)     Past Surgical History:   Procedure Laterality Date     ARTHROSCOPIC RECONSTRUCTION ANTERIOR AND POSTERIOR CRUCIATE LIGAMENT, COMBINED  2009    x2     ARTHROSCOPY KNEE Right 3/1/2017    Procedure: ARTHROSCOPY KNEE;  Surgeon: Abdelrahman Cui MD;  Location: [facility] OR     EYE SURGERY      x3       Social History   Substance Use Topics     Smoking status: Current Some Day Smoker     Packs/day: 1.00     Types: Cigarettes     Last attempt to quit: 12/21/2011     Smokeless tobacco:  "Never Used      Comment: now smoking only 1/2 ppw     Alcohol use 0.0 oz/week     0 Standard drinks or equivalent per week      Comment: 3-4 beers on weekends     Family History   Problem Relation Age of Onset     Adopted: Yes     Family history unknown: Yes           Reviewed and updated as needed this visit by clinical staff       Reviewed and updated as needed this visit by Provider         ROS:  Constitutional, HEENT, cardiovascular, pulmonary, gi and gu systems are negative, except as otherwise noted.    OBJECTIVE:     /90 (BP Location: Right arm, Patient Position: Chair, Cuff Size: Adult Large)  Pulse 101  Temp 98.6  F (37  C) (Tympanic)  Resp 19  Ht 5' 8\" (1.727 m)  Wt 308 lb 8 oz (139.9 kg)  SpO2 97%  BMI 46.91 kg/m2  Body mass index is 46.91 kg/(m^2).  GENERAL: healthy, alert and no distress  EYES: Eyes grossly normal to inspection, PERRL and conjunctivae and sclerae normal  RESP: lungs clear to auscultation - no rales, rhonchi or wheezes  CV: regular rates and rhythm, normal S1 S2, no S3 or S4 and no murmur, click or rub  MS: no gross musculoskeletal defects noted, no edema  PSYCH: mentation appears normal, affect normal/bright    Diagnostic Test Results:  none     ASSESSMENT/PLAN:   1. Essential hypertension with goal blood pressure less than 140/90  2. Morbid obesity (H)  3. MERRICK (obstructive sleep apnea)  Cristiano has a lot of things we need to focus on but today sounds more motivated. We again reviewed the risks to his overall health with uncontrolled bp, weight and smoking. He has successfully quit smoking now for 2 weeks. Back using CPAP and notes feeling much improved. He unfortunately has continued to gain weight - believes this is from less work activity. We'll need to consider medical intervention if he cant make improvements. BP is still uncontrolled despite maxing his current medication so we'll add amlodipine. To follow up in 3 months in clinic, but he'll check at the pharmacy a few " times this month.  - lisinopril-hydrochlorothiazide (PRINZIDE/ZESTORETIC) 20-25 MG per tablet; Take 2 tablets by mouth daily  Dispense: 180 tablet; Refill: 0  - amLODIPine (NORVASC) 5 MG tablet; Take 1 tablet (5 mg) by mouth At Bedtime  Dispense: 90 tablet; Refill: 0    Kiel Georges PA-C  [facility]

## 2018-04-10 NOTE — MR AVS SNAPSHOT
After Visit Summary   4/10/2018    Cristiano Casanova    MRN: [id number]           Patient Information     Date Of Birth          1990        Visit Information        Provider Department      4/10/2018 4:20 PM Kiel Georges PA-C [facility]        Today's Diagnoses     Essential hypertension with goal blood pressure less than 140/90    -  1    Morbid obesity (H)        MERRICK (obstructive sleep apnea)          Care Instructions    Please recheck your blood pressure at our pharmacy at least once weekly over the next month          Follow-ups after your visit        Follow-up notes from your care team     Return in about 3 months (around 7/10/2018) for BP Recheck.      Who to contact     If you have questions or need follow up information about today's clinic visit or your schedule please contact [facility] directly at [phone].  Normal or non-critical lab and imaging results will be communicated to you by Qomutyhart, letter or phone within 4 business days after the clinic has received the results. If you do not hear from us within 7 days, please contact the clinic through Qomutyhart or phone. If you have a critical or abnormal lab result, we will notify you by phone as soon as possible.  Submit refill requests through Pancetera or call your pharmacy and they will forward the refill request to us. Please allow 3 business days for your refill to be completed.          Additional Information About Your Visit        MyChart Information     Pancetera gives you secure access to your electronic health record. If you see a primary care provider, you can also send messages to your care team and make appointments. If you have questions, please call your primary care clinic.  If you do not have a primary care provider, please call [phone] and they will assist you.        Care EveryWhere ID     This is your Care EveryWhere ID. This could be used by other organizations to access  "your War medical records  [id number]        Your Vitals Were     Pulse Temperature Respirations Height Pulse Oximetry BMI (Body Mass Index)    101 98.6  F (37  C) (Tympanic) 19 5' 8\" (1.727 m) 97% 46.91 kg/m2       Blood Pressure from Last 3 Encounters:   04/10/18 152/90   03/13/18 (!) 145/93   02/09/18 (!) 146/94    Weight from Last 3 Encounters:   04/10/18 308 lb 8 oz (139.9 kg)   03/13/18 290 lb (131.5 kg)   02/09/18 295 lb (133.8 kg)              Today, you had the following     No orders found for display         Today's Medication Changes          These changes are accurate as of 4/10/18  4:49 PM.  If you have any questions, ask your nurse or doctor.               Start taking these medicines.        Dose/Directions    amLODIPine 5 MG tablet   Commonly known as:  NORVASC   Used for:  Essential hypertension with goal blood pressure less than 140/90   Started by:  Kiel Georges PA-C        Dose:  5 mg   Take 1 tablet (5 mg) by mouth At Bedtime   Quantity:  90 tablet   Refills:  0            Where to get your medicines      These medications were sent to [facility] [address] [address] [facility] [address] [place] [address] & [facility]  [address] [facility] [address]     Phone:  [phone]     amLODIPine 5 MG tablet    lisinopril-hydrochlorothiazide 20-25 MG per tablet                Primary Care Provider Office Phone # Fax #    Kiel Georges PA-C [phone]       [address] [address] [address]        Equal Access to Services     [facility]: Hadii aad tommy hadasho Soomaali, waaxda luqadaha, qaybta kaalmada adeegyada, maddy nunez. So [facility] [phone].    ATENCIÓN: Si habla español, tiene a ratliff disposición servicios gratuitos de asistencia lingüística. Llame al [phone].    We comply with applicable federal civil rights laws and [place] laws. We do not discriminate on the basis of race, color, " national origin, age, disability, sex, sexual orientation, or gender identity.            Thank you!     Thank you for choosing [facility]  for your care. Our goal is always to provide you with excellent care. Hearing back from our patients is one way we can continue to improve our services. Please take a few minutes to complete the written survey that you may receive in the mail after your visit with us. Thank you!             Your Updated Medication List - Protect others around you: Learn how to safely use, store and throw away your medicines at www.disposemymeds.org.          This list is accurate as of 4/10/18  4:49 PM.  Always use your most recent med list.                   Brand Name Dispense Instructions for use Diagnosis    amLODIPine 5 MG tablet    NORVASC    90 tablet    Take 1 tablet (5 mg) by mouth At Bedtime    Essential hypertension with goal blood pressure less than 140/90       ibuprofen 200 MG capsule           lisinopril-hydrochlorothiazide 20-25 MG per tablet    PRINZIDE/ZESTORETIC    180 tablet    Take 2 tablets by mouth daily    Essential hypertension with goal blood pressure less than 140/90

## 2018-06-09 ENCOUNTER — ALLIED HEALTH/NURSE VISIT (OUTPATIENT)
Dept: NURSING | Facility: CLINIC | Age: 28
End: 2018-06-09
Payer: COMMERCIAL

## 2018-06-09 DIAGNOSIS — Z23 NEED FOR HPV VACCINE: Primary | ICD-10-CM

## 2018-06-09 PROCEDURE — 90471 IMMUNIZATION ADMIN: CPT

## 2018-06-09 PROCEDURE — 90651 9VHPV VACCINE 2/3 DOSE IM: CPT

## 2018-06-14 ENCOUNTER — TELEPHONE (OUTPATIENT)
Dept: FAMILY MEDICINE | Facility: CLINIC | Age: 28
End: 2018-06-14

## 2018-06-14 NOTE — TELEPHONE ENCOUNTER
Type of outreach:  Sent Liquid X message.  Health Maintenance Due   Topic Date Due     HIV SCREEN (SYSTEM ASSIGNED)  09/20/2010     Due for BP F/U appt.   Janis Mueller MA

## 2018-06-21 NOTE — TELEPHONE ENCOUNTER
2nd attempt to contact, spoke with patient and he scheduled a BP F/U for 7/9/18.  Janis Mueller MA

## 2018-06-26 ENCOUNTER — DOCUMENTATION ONLY (OUTPATIENT)
Dept: SLEEP MEDICINE | Facility: CLINIC | Age: 28
End: 2018-06-26

## 2018-06-26 NOTE — PROGRESS NOTES
6 month [facility] Recheck Visit     Diagnostic AHI: 21        Message left for patient to return call     Assessment: Pt not meeting objective benchmarks for leak and compliance     Action plan: waiting for patient to return call.   pt to follow up per provider request (1-2 yrs)       Device type: Auto-CPAP  PAP settings: CPAP min 10 cm  H20     CPAP max 17 cm  H20    95th% pressure 12.6 cm  H20   Objective measures: 14 day rolling measures      Compliance  57 %      Leak  71.85 lpm  last  upload      AHI 1.66   last  upload      Average number of minutes 199      Objective measure goal  Compliance   Goal >70%  Leak   Goal < 24 lpm  AHI  Goal < 5  Usage  Goal >240

## 2018-09-29 ENCOUNTER — ALLIED HEALTH/NURSE VISIT (OUTPATIENT)
Dept: NURSING | Facility: CLINIC | Age: 28
End: 2018-09-29
Payer: COMMERCIAL

## 2018-09-29 DIAGNOSIS — Z23 NEED FOR PROPHYLACTIC VACCINATION AND INOCULATION AGAINST INFLUENZA: Primary | ICD-10-CM

## 2018-09-29 PROCEDURE — 90686 IIV4 VACC NO PRSV 0.5 ML IM: CPT

## 2018-09-29 PROCEDURE — 99207 ZZC NO CHARGE NURSE ONLY: CPT

## 2018-09-29 PROCEDURE — 90471 IMMUNIZATION ADMIN: CPT

## 2018-09-29 NOTE — PROGRESS NOTES

## 2018-09-29 NOTE — MR AVS SNAPSHOT
After Visit Summary   9/29/2018    Cristiano Casanova    MRN: [id number]           Patient Information     Date Of Birth          1990        Visit Information        Provider Department      9/29/2018 10:30 AM MIREYA DUMONT/LPN [facility]        Today's Diagnoses     Need for prophylactic vaccination and inoculation against influenza    -  1       Follow-ups after your visit        Who to contact     If you have questions or need follow up information about today's clinic visit or your schedule please contact [facility] directly at [phone].  Normal or non-critical lab and imaging results will be communicated to you by Svbtlehart, letter or phone within 4 business days after the clinic has received the results. If you do not hear from us within 7 days, please contact the clinic through Blink (air taxi)t or phone. If you have a critical or abnormal lab result, we will notify you by phone as soon as possible.  Submit refill requests through Plazes or call your pharmacy and they will forward the refill request to us. Please allow 3 business days for your refill to be completed.          Additional Information About Your Visit        MyChart Information     Plazes gives you secure access to your electronic health record. If you see a primary care provider, you can also send messages to your care team and make appointments. If you have questions, please call your primary care clinic.  If you do not have a primary care provider, please call [phone] and they will assist you.        Care EveryWhere ID     This is your Care EveryWhere ID. This could be used by other organizations to access your [place] medical records  [id number]         Blood Pressure from Last 3 Encounters:   04/10/18 152/90   03/13/18 (!) 145/93   02/09/18 (!) 146/94    Weight from Last 3 Encounters:   04/10/18 308 lb 8 oz (139.9 kg)   03/13/18 290 lb (131.5 kg)   02/09/18 295 lb (133.8 kg)              We  Performed the Following     FLU VACCINE, SPLIT VIRUS, IM (QUADRIVALENT) [61531]- >3 YRS     Vaccine Administration, Initial [81267]        Primary Care Provider Office Phone # Fax #    Kiel Georges PA-C [phone] [address] [address] [facility] [address]        Equal Access to Services     [facility] DIXON : Hadii aad ku hadasho Soomaali, waaxda luqadaha, qaybta kaalmada adeegyada, waxay idiin hayaan adeeg kharash laJuditaan . So [facility] [phone].    ATENCIÓN: Si habla español, tiene a ratliff disposición servicios gratuitos de asistencia lingüística. [facility] [phone].    We comply with applicable federal civil rights laws and [place] laws. We do not discriminate on the basis of race, color, national origin, age, disability, sex, sexual orientation, or gender identity.            Thank you!     Thank you for choosing [facility]  for your care. Our goal is always to provide you with excellent care. Hearing back from our patients is one way we can continue to improve our services. Please take a few minutes to complete the written survey that you may receive in the mail after your visit with us. Thank you!             Your Updated Medication List - Protect others around you: Learn how to safely use, store and throw away your medicines at www.disposemymeds.org.          This list is accurate as of 9/29/18 10:53 AM.  Always use your most recent med list.                   Brand Name Dispense Instructions for use Diagnosis    amLODIPine 5 MG tablet    NORVASC    90 tablet    Take 1 tablet (5 mg) by mouth At Bedtime    Essential hypertension with goal blood pressure less than 140/90       ibuprofen 200 MG capsule           lisinopril-hydrochlorothiazide 20-25 MG per tablet    PRINZIDE/ZESTORETIC    180 tablet    Take 2 tablets by mouth daily    Essential hypertension with goal blood pressure less than 140/90

## 2018-10-08 ENCOUNTER — TELEPHONE (OUTPATIENT)
Dept: FAMILY MEDICINE | Facility: CLINIC | Age: 28
End: 2018-10-08

## 2018-10-08 NOTE — TELEPHONE ENCOUNTER
Type of outreach:  Phone, spoke to patient.  Pt scheduled appt for BP check on 10/22/18.  Health Maintenance Due   Topic Date Due     HIV SCREEN (SYSTEM ASSIGNED)  09/20/2010     Pt is due for BP follow up appt.  Janis Mueller MA

## 2018-10-22 ENCOUNTER — OFFICE VISIT (OUTPATIENT)
Dept: FAMILY MEDICINE | Facility: CLINIC | Age: 28
End: 2018-10-22
Payer: COMMERCIAL

## 2018-10-22 VITALS
SYSTOLIC BLOOD PRESSURE: 142 MMHG | OXYGEN SATURATION: 96 % | HEIGHT: 68 IN | TEMPERATURE: 98.4 F | WEIGHT: 301.4 LBS | BODY MASS INDEX: 45.68 KG/M2 | HEART RATE: 100 BPM | RESPIRATION RATE: 14 BRPM | DIASTOLIC BLOOD PRESSURE: 101 MMHG

## 2018-10-22 DIAGNOSIS — I10 ESSENTIAL HYPERTENSION WITH GOAL BLOOD PRESSURE LESS THAN 140/90: Primary | ICD-10-CM

## 2018-10-22 PROCEDURE — 36415 COLL VENOUS BLD VENIPUNCTURE: CPT | Performed by: PHYSICIAN ASSISTANT

## 2018-10-22 PROCEDURE — 80048 BASIC METABOLIC PNL TOTAL CA: CPT | Performed by: PHYSICIAN ASSISTANT

## 2018-10-22 PROCEDURE — 99214 OFFICE O/P EST MOD 30 MIN: CPT | Performed by: PHYSICIAN ASSISTANT

## 2018-10-22 RX ORDER — AMLODIPINE BESYLATE 5 MG/1
5 TABLET ORAL AT BEDTIME
Qty: 90 TABLET | Refills: 0 | Status: SHIPPED | OUTPATIENT
Start: 2018-10-22 | End: 2020-07-03

## 2018-10-22 RX ORDER — LISINOPRIL AND HYDROCHLOROTHIAZIDE 20; 25 MG/1; MG/1
1 TABLET ORAL DAILY
Qty: 90 TABLET | Refills: 0 | Status: SHIPPED | OUTPATIENT
Start: 2018-10-22 | End: 2020-07-03

## 2018-10-22 NOTE — PROGRESS NOTES
SUBJECTIVE:   Cristiano Casanova is a 28 year old male who presents to clinic today for the following health issues:    Hypertension Follow-up      Outpatient blood pressures are being checked on occasion     Low Salt Diet: no added salt    Amount of exercise or physical activity: Walking 2 times per week, plays soccer 1-2 times per week     Problems taking medications regularly: No    Medication side effects: none    Diet: regular (no restrictions)    -Patient presents to update his blood pressure control  -Since last seen he has been trying to be more active   -trying to walk twice a week with his GF   -playing soccer once  -He was only taking his blood pressure medication once daily (lisinopril-hydrochlorothiazide) and when he ran out of amlodipine he did not take it  -when he was taking his blood pressure meds as prescribed without side effects  -wear CPAP around 5 times weekly  -no chest pain, shortness of breath, palpitations  -occasional HA, mostly when not taking medication    Problem list and histories reviewed & adjusted, as indicated.  Additional history: as documented    Patient Active Problem List   Diagnosis     Pes planus     Plantar fascial fibromatosis     Pain in limb     Obesity     Hypertension goal BP (blood pressure) < 140/90     CARDIOVASCULAR SCREENING; LDL GOAL LESS THAN 160     MERRICK (obstructive sleep apnea)     Acute pain of right knee     Aftercare following surgery of the musculoskeletal system     Morbid obesity (H)     Past Surgical History:   Procedure Laterality Date     ARTHROSCOPIC RECONSTRUCTION ANTERIOR AND POSTERIOR CRUCIATE LIGAMENT, COMBINED  2009    x2     ARTHROSCOPY KNEE Right 3/1/2017    Procedure: ARTHROSCOPY KNEE;  Surgeon: Abdelrahman Cui MD;  Location: [facility] OR     EYE SURGERY      x3       Social History   Substance Use Topics     Smoking status: Current Some Day Smoker     Packs/day: 1.00     Types: Cigarettes     Last attempt to quit: 12/21/2011     Smokeless  "tobacco: Never Used      Comment: now smoking only 1/2 ppw     Alcohol use 0.0 oz/week     0 Standard drinks or equivalent per week      Comment: 3-4 beers on weekends     Family History   Problem Relation Age of Onset     Adopted: Yes     Family history unknown: Yes           Reviewed and updated as needed this visit by clinical staff       Reviewed and updated as needed this visit by Provider         ROS:  Constitutional, HEENT, cardiovascular, pulmonary, gi and gu systems are negative, except as otherwise noted.    OBJECTIVE:     BP (!) 142/101  Pulse 100  Temp 98.4  F (36.9  C) (Tympanic)  Resp 14  Ht 5' 8\" (1.727 m)  Wt 301 lb 6.4 oz (136.7 kg)  SpO2 96%  BMI 45.83 kg/m2  Body mass index is 45.83 kg/(m^2).  GENERAL: healthy, alert and no distress  EYES: Eyes grossly normal to inspection, PERRL and conjunctivae and sclerae normal  NECK: thyroid normal to palpation and no carotid bruits  RESP: lungs clear to auscultation - no rales, rhonchi or wheezes  CV: regular rate and rhythm, normal S1 S2, no S3 or S4, no murmur, click or rub, no peripheral edema and peripheral pulses strong    Diagnostic Test Results:  See A/P    ASSESSMENT/PLAN:   1. Essential hypertension with goal blood pressure less than 140/90  Uncontrolled. Not overly surprising as he didn't refill amlodipine when it ran out and only took one prinzide daily. He is asymptomatic. He HAS been wearing CPAP and does note he feels better on this. Still, I reviewed with him the dangers of poor control. Im not sure he is motivated yet. I will start back only once daily for both medications and have sincerely asked him to be more compliant. He agrees. RTC 3 months. 1 week for BP check.  - lisinopril-hydrochlorothiazide (PRINZIDE/ZESTORETIC) 20-25 MG per tablet; Take 1 tablet by mouth daily  Dispense: 90 tablet; Refill: 0  - amLODIPine (NORVASC) 5 MG tablet; Take 1 tablet (5 mg) by mouth At Bedtime  Dispense: 90 tablet; Refill: 0  - Basic metabolic " panel    Kiel Georges PA-C  [facility]

## 2018-10-22 NOTE — PATIENT INSTRUCTIONS
Cristiano - you have to take the meds for them to work!    PLEASE come back and check your blood pressure at a [place] pharmacy in 1 week

## 2018-10-22 NOTE — MR AVS SNAPSHOT
After Visit Summary   10/22/2018    Cristiano Casanova    MRN: [id number]           Patient Information     Date Of Birth          1990        Visit Information        Provider Department      10/22/2018 4:00 PM Kiel Georges PA-C [facility]        Today's Diagnoses     Essential hypertension with goal blood pressure less than 140/90    -  1      Care Instructions    Cristiano - you have to take the meds for them to work!    PLEASE come back and check your blood pressure at a [place] pharmacy in 1 week          Follow-ups after your visit        Follow-up notes from your care team     Return in about 3 months (around 1/22/2019) for BP Recheck.      Who to contact     If you have questions or need follow up information about today's clinic visit or your schedule please contact [facility] directly at [phone].  Normal or non-critical lab and imaging results will be communicated to you by MyChart, letter or phone within 4 business days after the clinic has received the results. If you do not hear from us within 7 days, please contact the clinic through PharmAkea Therapeuticshart or phone. If you have a critical or abnormal lab result, we will notify you by phone as soon as possible.  Submit refill requests through SocialGuide or call your pharmacy and they will forward the refill request to us. Please allow 3 business days for your refill to be completed.          Additional Information About Your Visit        MyChart Information     SocialGuide gives you secure access to your electronic health record. If you see a primary care provider, you can also send messages to your care team and make appointments. If you have questions, please call your primary care clinic.  If you do not have a primary care provider, please call [phone] and they will assist you.        Care EveryWhere ID     This is your Care EveryWhere ID. This could be used by other organizations to access your [facility]  "records  [id number]        Your Vitals Were     Pulse Temperature Respirations Height Pulse Oximetry BMI (Body Mass Index)    100 98.4  F (36.9  C) (Tympanic) 14 5' 8\" (1.727 m) 96% 45.83 kg/m2       Blood Pressure from Last 3 Encounters:   10/22/18 (!) 142/101   04/10/18 152/90   03/13/18 (!) 145/93    Weight from Last 3 Encounters:   10/22/18 301 lb 6.4 oz (136.7 kg)   04/10/18 308 lb 8 oz (139.9 kg)   03/13/18 290 lb (131.5 kg)              We Performed the Following     Basic metabolic panel          Today's Medication Changes          These changes are accurate as of 10/22/18  4:59 PM.  If you have any questions, ask your nurse or doctor.               These medicines have changed or have updated prescriptions.        Dose/Directions    lisinopril-hydrochlorothiazide 20-25 MG per tablet   Commonly known as:  PRINZIDE/ZESTORETIC   This may have changed:  how much to take   Used for:  Essential hypertension with goal blood pressure less than 140/90   Changed by:  Kiel Georges PA-C        Dose:  1 tablet   Take 1 tablet by mouth daily   Quantity:  90 tablet   Refills:  0            Where to get your medicines      These medications were sent to [facility] [address] [place] [address]     Phone:  [phone]     amLODIPine 5 MG tablet    lisinopril-hydrochlorothiazide 20-25 MG per tablet                Primary Care Provider Office Phone # Fax #    Kiel Georges PA-C [phone] [address] [facility] [address]        Equal Access to Services     FLOYD METCALF AH: Jose Manuel gillis Sobroderick, waaxda luqadaha, qaybta kaalmada adeegyada, maddy nunez. So [facility] [phone].    ATENCIÓN: Si habla español, tiene a ratliff disposición servicios gratuitos de asistencia lingüística. Leslye nunn [phone].    We comply with applicable federal civil rights " laws and [place] laws. We do not discriminate on the basis of race, color, national origin, age, disability, sex, sexual orientation, or gender identity.            Thank you!     Thank you for choosing [facility]  for your care. Our goal is always to provide you with excellent care. Hearing back from our patients is one way we can continue to improve our services. Please take a few minutes to complete the written survey that you may receive in the mail after your visit with us. Thank you!             Your Updated Medication List - Protect others around you: Learn how to safely use, store and throw away your medicines at www.disposemymeds.org.          This list is accurate as of 10/22/18  4:59 PM.  Always use your most recent med list.                   Brand Name Dispense Instructions for use Diagnosis    amLODIPine 5 MG tablet    NORVASC    90 tablet    Take 1 tablet (5 mg) by mouth At Bedtime    Essential hypertension with goal blood pressure less than 140/90       ibuprofen 200 MG capsule           lisinopril-hydrochlorothiazide 20-25 MG per tablet    PRINZIDE/ZESTORETIC    90 tablet    Take 1 tablet by mouth daily    Essential hypertension with goal blood pressure less than 140/90

## 2018-10-23 LAB
ANION GAP SERPL CALCULATED.3IONS-SCNC: 6 MMOL/L (ref 3–14)
BUN SERPL-MCNC: 11 MG/DL (ref 7–30)
CALCIUM SERPL-MCNC: 9.3 MG/DL (ref 8.5–10.1)
CHLORIDE SERPL-SCNC: 106 MMOL/L (ref 94–109)
CO2 SERPL-SCNC: 28 MMOL/L (ref 20–32)
CREAT SERPL-MCNC: 0.72 MG/DL (ref 0.66–1.25)
GFR SERPL CREATININE-BSD FRML MDRD: >90 ML/MIN/1.7M2
GLUCOSE SERPL-MCNC: 93 MG/DL (ref 70–99)
POTASSIUM SERPL-SCNC: 4.6 MMOL/L (ref 3.4–5.3)
SODIUM SERPL-SCNC: 140 MMOL/L (ref 133–144)

## 2018-10-31 ENCOUNTER — ALLIED HEALTH/NURSE VISIT (OUTPATIENT)
Dept: FAMILY MEDICINE | Facility: CLINIC | Age: 28
End: 2018-10-31
Payer: COMMERCIAL

## 2018-10-31 VITALS — DIASTOLIC BLOOD PRESSURE: 80 MMHG | SYSTOLIC BLOOD PRESSURE: 115 MMHG

## 2018-10-31 DIAGNOSIS — Z01.30 BP CHECK: Primary | ICD-10-CM

## 2018-10-31 PROCEDURE — 99207 ZZC NO CHARGE NURSE ONLY: CPT | Performed by: PHYSICIAN ASSISTANT

## 2018-10-31 NOTE — MR AVS SNAPSHOT
After Visit Summary   10/31/2018    Cristiano Casanova    MRN: [id number]           Patient Information     Date Of Birth          1990        Visit Information        Provider Department      10/31/2018 4:00 PM Kiel Georges PA-C [facility]        Today's Diagnoses     BP check    -  1       Follow-ups after your visit        Who to contact     If you have questions or need follow up information about today's clinic visit or your schedule please contact [facility] directly at [phone].  Normal or non-critical lab and imaging results will be communicated to you by Kickstarterhart, letter or phone within 4 business days after the clinic has received the results. If you do not hear from us within 7 days, please contact the clinic through Texan Hostingt or phone. If you have a critical or abnormal lab result, we will notify you by phone as soon as possible.  Submit refill requests through Ventario or call your pharmacy and they will forward the refill request to us. Please allow 3 business days for your refill to be completed.          Additional Information About Your Visit        MyChart Information     Ventario gives you secure access to your electronic health record. If you see a primary care provider, you can also send messages to your care team and make appointments. If you have questions, please call your primary care clinic.  If you do not have a primary care provider, please call [phone] and they will assist you.        Care EveryWhere ID     This is your Care EveryWhere ID. This could be used by other organizations to access your [place] medical records  [id number]         Blood Pressure from Last 3 Encounters:   10/31/18 115/80   10/22/18 (!) 142/101   04/10/18 152/90    Weight from Last 3 Encounters:   10/22/18 301 lb 6.4 oz (136.7 kg)   04/10/18 308 lb 8 oz (139.9 kg)   03/13/18 290 lb (131.5 kg)              Today, you had the following     No orders found  for display       Primary Care Provider Office Phone # Fax #    Kiel Georges PA-C [phone]       [address] [facility] [address]        Equal Access to Services     FLOYD METCALF : Hadii aad ku hadasho Soomaali, waaxda luqadaha, qaybta kaalmada adeegyada, waxstefania toroin rosannen coco spencer laJuditsara nunez. So [facility] [phone].    ATENCIÓN: Si habla español, tiene a ratliff disposición servicios gratuitos de asistencia lingüística. Llame al [phone].    We comply with applicable federal civil rights laws and [place] laws. We do not discriminate on the basis of race, color, national origin, age, disability, sex, sexual orientation, or gender identity.            Thank you!     Thank you for choosing [facility]  for your care. Our goal is always to provide you with excellent care. Hearing back from our patients is one way we can continue to improve our services. Please take a few minutes to complete the written survey that you may receive in the mail after your visit with us. Thank you!             Your Updated Medication List - Protect others around you: Learn how to safely use, store and throw away your medicines at www.disposemymeds.org.          This list is accurate as of 10/31/18  4:02 PM.  Always use your most recent med list.                   Brand Name Dispense Instructions for use Diagnosis    amLODIPine 5 MG tablet    NORVASC    90 tablet    Take 1 tablet (5 mg) by mouth At Bedtime    Essential hypertension with goal blood pressure less than 140/90       ibuprofen 200 MG capsule           lisinopril-hydrochlorothiazide 20-25 MG per tablet    PRINZIDE/ZESTORETIC    90 tablet    Take 1 tablet by mouth daily    Essential hypertension with goal blood pressure less than 140/90

## 2018-10-31 NOTE — PROGRESS NOTES
Cristiano Casanova is enrolled/participating in the retail pharmacy Blood Pressure Goals Achievement Program (BPGAP).  Cristiano Casanova was evaluated at [facility] on October 31, 2018 at which time his blood pressure was:    BP Readings from Last 3 Encounters:   10/31/18 115/80   10/22/18 (!) 142/101   04/10/18 152/90     Reviewed lifestyle modifications for blood pressure control and reduction: including making healthy food choices, managing weight, getting regular exercise, smoking cessation, reducing alcohol consumption, monitoring blood pressure regularly.     Cristinao Casanova is not experiencing symptoms.    Follow-Up: BP is at goal of < 140/90mmHg (patient 18+ years of age with or without diabetes).  Recommended follow-up at pharmacy in 6 months.     Recommendation to Provider: none    Cristiano Casanova was evaluated for enrollment into the PGEN study today.    PLEASE INITIATE ENROLLMENT DISCUSSION WITH HTN PTS  1) Between 30-80 years old                                                                                                               2) BMI between 19-50                                                                                                        3) BP ?140/90 AND ?170/110 patients aged 30-59         BP ?150/90 AND ?170/110 non-diabetic patients aged 60-80       BP ?140/90 AND ?170/110 diabetic patients aged 60-80  4) Additional requirements for uncontrolled HTN patients:        Pt on only 1 class of medication  5) EXCLUDE patient if confirmation of:                  ? Cardiac disease                  ? Chronic Kidney Disease                  ? Pregnancy/Breastfeeding                  ? Secondary Hypertension/Pre-eclampsia                                 ? Vascular disease    Patient eligible for enrollment:  No  Patient interested in enrollment:  No    This note completed by: Rahul Rodriguez    Thank You   Rahul Rodriguez RPh [facility]

## 2019-08-21 ENCOUNTER — DOCUMENTATION ONLY (OUTPATIENT)
Dept: SLEEP MEDICINE | Facility: CLINIC | Age: 29
End: 2019-08-21

## 2019-10-01 ENCOUNTER — HEALTH MAINTENANCE LETTER (OUTPATIENT)
Age: 29
End: 2019-10-01

## 2019-11-14 ENCOUNTER — HOSPITAL ENCOUNTER (EMERGENCY)
Facility: CLINIC | Age: 29
Discharge: HOME | End: 2019-11-14
Attending: EMERGENCY MEDICINE | Admitting: EMERGENCY MEDICINE
Payer: COMMERCIAL

## 2019-11-14 VITALS
TEMPERATURE: 99.4 F | DIASTOLIC BLOOD PRESSURE: 92 MMHG | RESPIRATION RATE: 14 BRPM | SYSTOLIC BLOOD PRESSURE: 145 MMHG | OXYGEN SATURATION: 100 %

## 2019-11-14 DIAGNOSIS — S39.012A STRAIN OF LUMBAR REGION, INITIAL ENCOUNTER: ICD-10-CM

## 2019-11-14 DIAGNOSIS — I10 HYPERTENSION, UNSPECIFIED TYPE: ICD-10-CM

## 2019-11-14 PROCEDURE — 99282 EMERGENCY DEPT VISIT SF MDM: CPT

## 2019-11-14 RX ORDER — LISINOPRIL 10 MG/1
20 TABLET ORAL ONCE
Status: DISCONTINUED | OUTPATIENT
Start: 2019-11-14 | End: 2019-11-14

## 2019-11-14 RX ORDER — CYCLOBENZAPRINE HCL 10 MG
10 TABLET ORAL 3 TIMES DAILY PRN
Qty: 10 TABLET | Refills: 0 | Status: SHIPPED | OUTPATIENT
Start: 2019-11-14 | End: 2020-07-03

## 2019-11-14 RX ORDER — HYDROCHLOROTHIAZIDE 25 MG/1
25 TABLET ORAL ONCE
Status: DISCONTINUED | OUTPATIENT
Start: 2019-11-14 | End: 2019-11-14

## 2019-11-14 RX ORDER — LIDOCAINE 50 MG/G
1 PATCH TOPICAL EVERY 24 HOURS
Qty: 10 PATCH | Refills: 0 | Status: SHIPPED | OUTPATIENT
Start: 2019-11-14 | End: 2019-11-24

## 2019-11-14 RX ORDER — LISINOPRIL AND HYDROCHLOROTHIAZIDE 20; 25 MG/1; MG/1
1 TABLET ORAL ONCE
Status: DISCONTINUED | OUTPATIENT
Start: 2019-11-14 | End: 2019-11-14 | Stop reason: RX

## 2019-11-14 ASSESSMENT — ENCOUNTER SYMPTOMS
BLOOD IN STOOL: 0
FREQUENCY: 0
CONSTIPATION: 0
NUMBNESS: 0
DYSURIA: 0
DIARRHEA: 0
WEAKNESS: 0
DIFFICULTY URINATING: 0
BACK PAIN: 1
FEVER: 0
HEMATURIA: 0

## 2019-11-14 NOTE — ED AVS SNAPSHOT
[facility] Emergency Department  [address]  [facility] [address]  Phone:  [phone]  Fax:  [phone]                                    Cristiano Casanova   MRN: [id number]    Department:  [facility] Emergency Department   Date of Visit:  11/14/2019           After Visit Summary Signature Page    I have received my discharge instructions, and my questions have been answered. I have discussed any challenges I see with this plan with the nurse or doctor.    ..........................................................................................................................................  Patient/Patient Representative Signature      ..........................................................................................................................................  Patient Representative Print Name and Relationship to Patient    ..................................................               ................................................  Date                                   Time    ..........................................................................................................................................  Reviewed by Signature/Title    ...................................................              ..............................................  Date                                               Time          22EPIC Rev 08/18

## 2019-11-14 NOTE — DISCHARGE INSTRUCTIONS
Discharge Instructions  Back Pain  You were seen today for back pain. Back pain can have many causes, but most will get better without surgery or other specific treatment. Sometimes there is a herniated ( slipped ) disc. We do not usually do MRI scans to look for these right away, since most herniated discs will get better on their own with time.  Today, we did not find any evidence that your back pain was caused by a serious condition. However, sometimes symptoms develop over time and cannot be found during an emergency visit, so it is very important that you follow up with your primary provider.  Generally, every Emergency Department visit should have a follow-up clinic visit with either a primary or a specialty clinic/provider. Please follow-up as instructed by your emergency provider today.    Return to the Emergency Department if:  You develop a fever with your back pain.   You have weakness or change in sensation in one or both legs.  You lose control of your bowels or bladder, or cannot empty your bladder (cannot pee).  Your pain gets much worse.     Follow-up with your provider:  Unless your pain has completely gone away, please make an appointment with your provider within one week. Most of the routine care for back pain is available in a clinic and not the Emergency Department. You may need further management of your back pain, such as more pain medication, imaging such as an X-ray or MRI, or physical therapy.    What can I do to help myself?  Remain Active -- People are often afraid that they will hurt their back further or delay recovery by remaining active, but this is one of the best things you can do for your back. In fact, staying in bed for a long time to rest is not recommended. Studies have shown that people with low back pain recover faster when they remain active. Movement helps to bring blood flow to the muscles and relieve muscle spasms as well as preventing loss of muscle strength.  Heat --  Using a heating pad can help with low back pain during the first few weeks. Do not sleep with a heating pad, as you can be burned.   Pain medications - You may take a pain medication such as Tylenol  (acetaminophen), Advil , Motrin  (ibuprofen) or Aleve  (naproxen).  If you were given a prescription for medicine here today, be sure to read all of the information (including the package insert) that comes with your prescription.  This will include important information about the medicine, its side effects, and any warnings that you need to know about.  The pharmacist who fills the prescription can provide more information and answer questions you may have about the medicine.  If you have questions or concerns that the pharmacist cannot address, please call or return to the Emergency Department.   Remember that you can always come back to the Emergency Department if you are not able to see your regular provider in the amount of time listed above, if you get any new symptoms, or if there is anything that worries you.  Discharge Instructions  Hypertension - High Blood Pressure    During you visit to the Emergency Department, your blood pressure was higher than the recommended blood pressure.  This may be related to stress, pain, medication or other temporary conditions. In these cases, your blood pressure may return to normal on its own. If you have a history of high blood pressure, you may need to have your provider adjust your medications. Sometimes, your high measurement here may indicate that you have developed high blood pressure that will stay high unless it is treated. As a general rule, high blood pressure causes problems over years rather than days, weeks, or months. So, while it is important to treat blood pressure, it is rarely important to treat blood pressure immediately. Occasionally we will begin a medication in the Emergency Department; more often we will recommend close follow-up for medications with a  primary doctor/clinic.    Generally, every Emergency Department visit should have a follow-up clinic visit with either a primary or a specialty clinic/provider. Please follow-up as instructed by your emergency provider today.    Return to the Emergency Department if you start to have:  A severe headache.  Chest pain.  Shortness of breath.  Weakness or numbness that affects one part of the body.  Confusion.  Vision changes.  Significant swelling of legs and/or eyes.  A reaction to any medication started in the Emergency Department.    What can I do to help myself?  Avoid alcohol.  Take any blood pressure medicine that you are prescribed.  Get a good night s sleep.  Lower your salt intake.  Exercise.  Lose weight.  Manage stress.  See your doctor regularly    If blood pressure medication was started in the Emergency Department:  The medicine may not have an immediate effect. The body and brain determine what blood pressure you have. The medicine s job is to retrain the body s  thermostat  to a lower blood pressure.  You will need to follow up with your provider to see how this medicine is working for you.  If you were given a prescription for medicine here today, be sure to read all of the information (including the package insert) that comes with your prescription.  This will include important information about the medicine, its side effects, and any warnings that you need to know about.  The pharmacist who fills the prescription can provide more information and answer questions you may have about the medicine.  If you have questions or concerns that the pharmacist cannot address, please call or return to the Emergency Department.   Remember that you can always come back to the Emergency Department if you are not able to see your regular provider in the amount of time listed above, if you get any new symptoms, or if there is anything that worries you.

## 2019-11-14 NOTE — ED TRIAGE NOTES
A&O x4, ABCs intact. Pt presents with bilateral lower back pain that started this morning. Pt denies numbness/tingling in legs, denies incontinence, and no recent injury.

## 2019-11-14 NOTE — ED PROVIDER NOTES
History     Chief Complaint:  Back Pain    HPI   Cristiano Casanova is a 29 year old male who presents with back pain. The patient reports that at work today, he started to have non-radiating lower back pain and spasms which got worse throughout the day as he was working. He notes that he started to feel lightheaded which he when he decided to come in. He denies weakness in his legs, numbness in his legs and anus, urinary symptoms, and bowel symptoms. The patient was hypertensive here but notes he has not taken his blood pressure medication today.    Allergies:  No known drug allergies    Medications:    Norvasc  Prinzide     Past Medical History:    Hypertension  Obesity  Sleep apnea    Past Surgical History:    Reconstruction anterior and posterior cruciate ligament (x2)  Eye surgery (x3)    Family History:    The patient is adopted    Social History:  Smoking status: Yes, 1.0 packs/day  Alcohol use: Yes 3-4 beers/week  Drug use: No  PCP: Kiel Georges  Presents to the ED with mother and father  Works in construction  Marital Status:  Single [1]    Review of Systems   Constitutional: Negative for fever.   Gastrointestinal: Negative for blood in stool, constipation and diarrhea.   Genitourinary: Negative for decreased urine volume, difficulty urinating, dysuria, frequency, hematuria and urgency.   Musculoskeletal: Positive for back pain.   Neurological: Negative for weakness and numbness.   All other systems reviewed and are negative.      Physical Exam     Patient Vitals for the past 24 hrs:  BP (!) 145/92   Temp 99.4  F (37.4  C) (Temporal)   Resp 14   SpO2 100%     Physical Exam  Constitutional: Alert, attentive, GCS 15  HENT:    Nose: Nose normal.    Mouth/Throat: Oropharynx is clear, mucous membranes are moist  Eyes: EOM are normal, anicteric, conjugate gaze  CV: regular rate and rhythm; no murmurs  Chest: Effort normal and breath sounds clear without wheezing or rales, symmetric bilaterally   GI:  non  tender. No distension. No guarding or rebound.    MSK: No LE edema, no tenderness to palpation of BLE.  Neurological: Alert, attentive, moving all extremities equally. 5/5 lower extremity strength.    Skin: Skin is warm and dry.  Back: Mild right paraspinal muscle tenderness    Emergency Department Course   Procedures:  None     Emergency Department Course:  Past medical records, nursing notes, and vitals reviewed.  1524: I performed an exam of the patient and obtained history, as documented above.    The patient's blood pressure was lower on recheck. Findings and plan explained to the Patient and mother and father. Patient discharged home with instructions regarding supportive care, medications, and reasons to return. The importance of close follow-up was reviewed.      Impression & Plan    Medical Decision Making:  Cristiano Casanova is a 29 year old male who presents for evaluation of back pain that started during work today.  He has no history of back pain in the past.  Pain has improved with interventions in the emergency department. The patient did not sustain any trauma, therefore x-rays are not necessary due to the low likelihood of fracture or subluxation.  No red flag symptoms to suggest CT and/or MRI is indicated at this point.  There is no clinical evidence of cauda equina syndrome, discitis, spinal/epidural space hematoma or epidural abscess or other worrisome etiology. The neurological exam is normal and the patient's symptoms seem consistent with musculoskeletal issues and significant muscle spasm.   The patient will be discharged with pain medications to use as directed. Ice or heat to the back and stretching exercises. No heavy lifting, bending or twisting. Return if increasing pain, numbness, weakness, or bowel or bladder dysfunction. He was advised to schedule follow-up with his primary doctor within 3 days to re-assess symptoms.    Diagnosis:    ICD-10-CM    1. Strain of lumbar region, initial  encounter S39.012A    2. Hypertension, unspecified type I10        Disposition:  discharged to home    Discharge Medications:  New Prescriptions    CYCLOBENZAPRINE (FLEXERIL) 10 MG TABLET    Take 1 tablet (10 mg) by mouth 3 times daily as needed for muscle spasms    LIDOCAINE (LIDODERM) 5 % PATCH    Place 1 patch onto the skin every 24 hours for 10 days     Jin Sanchez MD  [employer]  6:58 PM 11/14/19     Alfred Meredith  11/14/2019   [facility] EMERGENCY DEPARTMENT  I, Alfred Meredith, am serving as a scribe at 3:24 PM on 11/14/2019 to document services personally performed by Jin Sanchez MD based on my observations and the provider's statements to me.      Jin Sanchez MD  11/14/19 [id number]

## 2020-06-09 DIAGNOSIS — G47.33 OSA (OBSTRUCTIVE SLEEP APNEA): Primary | ICD-10-CM

## 2020-07-02 NOTE — PROGRESS NOTES
[facility]  [address] [id number] [phone]  Dept: [phone]    PRE-OP EVALUATION:  Today's date: 7/3/2020    Cristiano Casanova (: 1990) presents for pre-operative evaluation assessment as requested by Dr. Abdelrahman Kaur .  He requires evaluation and anesthesia risk assessment prior to undergoing surgery/procedure for treatment of right Knee  .    Proposed Surgery/ Procedure: arthroscopy knee repair  Date of Surgery/ Procedure: 2020  Time of Surgery/ Procedure: 6:15  Hospital/Surgical Facility: [facility]  Fax number for surgical facility: [phone]  Primary Physician: Kiel Georges  Type of Anesthesia Anticipated: General    Patient has a Health Care Directive or Living Will:  NO    1. NO - Do you have a history of heart attack, stroke, stent, bypass or surgery on an artery in the head, neck, heart or legs?  2. NO - Do you ever have any pain or discomfort in your chest?  3. NO - Do you have a history of  Heart Failure?  4. NO - Are you troubled by shortness of breath when: walking on the level, up a slight hill or at night?  5. NO - Do you currently have a cold, bronchitis or other respiratory infection?  6. NO - Do you have a cough, shortness of breath or wheezing?  7. NO - Do you sometimes get pains in the calves of your legs when you walk?  8. NO - Do you or anyone in your family have previous history of blood clots?  9. NO - Do you or does anyone in your family have a serious bleeding problem such as prolonged bleeding following surgeries or cuts?  10. NO - Have you ever had problems with anemia or been told to take iron pills?  11. NO - Have you had any abnormal blood loss such as black, tarry or bloody stools, or abnormal vaginal bleeding?  12. NO - Have you ever had a blood transfusion?  13. NO - Have you or any of your relatives ever had problems with anesthesia?  14. YES - Do you have sleep apnea, excessive snoring  or daytime drowsiness?  15. NO - Do you have any prosthetic heart valves?  16. NO - Do you have prosthetic joints?  17. NO - Is there any chance that you may be pregnant?      HPI:     HPI related to upcoming procedure: Cristiano had a new onset knee pain a few weeks ago without overt injury. Had imaging (XRAY/MRI) and showed bone spurring and loose bodies. He was experiencing knee instability and pain to the medial aspect. Did have ACL repair in 2009 and a right knee arthroscopy in 2017      HYPERTENSION - Patient has longstanding history of HTN , currently denies any symptoms referable to elevated blood pressure. Specifically denies chest pain, palpitations, dyspnea, orthopnea, PND or peripheral edema. Blood pressure readings have not been in normal range - though they are close. Current medication regimen is as listed below. Patient denies any side effects of medication.     SLEEP PROBLEM - Patient has a longstanding history of MERRICK. Has CPAP machine, will bring to surgery    MEDICAL HISTORY:     Patient Active Problem List    Diagnosis Date Noted     Morbid obesity (H) 12/08/2017     Priority: Medium     Acute pain of right knee 03/17/2017     Priority: Medium     Aftercare following surgery of the musculoskeletal system 03/17/2017     Priority: Medium     MERRICK (obstructive sleep apnea) 02/21/2017     Priority: Medium     CARDIOVASCULAR SCREENING; LDL GOAL LESS THAN 160 01/20/2012     Priority: Medium     Hypertension goal BP (blood pressure) < 140/90 09/06/2011     Priority: Medium     Obesity 12/28/2010     Priority: Medium     Pes planus 06/12/2006     Priority: Medium     Problem list name updated by automated process. Provider to review       Plantar fascial fibromatosis 06/12/2006     Priority: Medium     Pain in limb 06/12/2006     Priority: Medium      Past Medical History:   Diagnosis Date     Hypertension      Obesity      Sleep apnea      Past Surgical History:   Procedure Laterality Date     ARTHROSCOPIC  "RECONSTRUCTION ANTERIOR AND POSTERIOR CRUCIATE LIGAMENT, COMBINED  2009    x2     ARTHROSCOPY KNEE Right 3/1/2017    Procedure: ARTHROSCOPY KNEE;  Surgeon: Abdelrahman Cui MD;  Location: [facility] OR     EYE SURGERY      x3     Current Outpatient Medications   Medication Sig Dispense Refill     amLODIPine (NORVASC) 5 MG tablet Take 1 tablet (5 mg) by mouth At Bedtime 90 tablet 0     ibuprofen 200 MG capsule Take by mouth every 4 hours as needed        lisinopril-hydrochlorothiazide (ZESTORETIC) 20-25 MG tablet Take 1 tablet by mouth daily 90 tablet 0     OTC products: NSAIDS - reviewed today    Allergies   Allergen Reactions     No Known Drug Allergies       Latex Allergy: NO    Social History     Tobacco Use     Smoking status: Current Some Day Smoker     Packs/day: 1.00     Types: Cigarettes     Last attempt to quit: 2011     Years since quittin.5     Smokeless tobacco: Never Used     Tobacco comment: now smoking only 1/2 ppd   Substance Use Topics     Alcohol use: Yes     Alcohol/week: 0.0 standard drinks     Comment: 3-4 beers on weekends     History   Drug Use No       REVIEW OF SYSTEMS:   Constitutional, neuro, ENT, endocrine, pulmonary, cardiac, gastrointestinal, genitourinary, musculoskeletal, integument and psychiatric systems are negative, except as otherwise noted.    EXAM:   BP (!) 132/92 (BP Location: Right arm, Cuff Size: Adult Large)   Pulse 82   Temp 98.4  F (36.9  C) (Oral)   Resp 16   Ht 1.727 m (5' 8\")   Wt 140.8 kg (310 lb 8 oz)   SpO2 100%   BMI 47.21 kg/m      GENERAL APPEARANCE: healthy, alert and no distress     EYES: EOMI,  PERRL     HENT: ear canals and TM's normal and nose and mouth without ulcers or lesions     NECK: no adenopathy, no asymmetry, masses, or scars and thyroid normal to palpation     RESP: lungs clear to auscultation - no rales, rhonchi or wheezes     CV: regular rates and rhythm; normal s12; no s3     ABDOMEN: obese     MS: No peripheral edema; mild " edema to the right knee space     SKIN: no suspicious lesions or rashes in area of planned procedure     NEURO: Normal strength and tone, sensory exam grossly normal, mentation intact and speech normal     PSYCH: mentation appears normal. and affect normal/bright    DIAGNOSTICS:   EKG: appears normal, NSR, normal axis, normal intervals, no acute ST/T changes c/w ischemia, no LVH by voltage criteria, unchanged from previous tracings    Last Comprehensive Metabolic Panel:  Sodium   Date Value Ref Range Status   07/03/2020 135 133 - 144 mmol/L Final     Potassium   Date Value Ref Range Status   07/03/2020 4.0 3.4 - 5.3 mmol/L Final     Chloride   Date Value Ref Range Status   07/03/2020 103 94 - 109 mmol/L Final     Carbon Dioxide   Date Value Ref Range Status   07/03/2020 24 20 - 32 mmol/L Final     Anion Gap   Date Value Ref Range Status   07/03/2020 8 3 - 14 mmol/L Final     Glucose   Date Value Ref Range Status   07/03/2020 85 70 - 99 mg/dL Final     Urea Nitrogen   Date Value Ref Range Status   07/03/2020 12 7 - 30 mg/dL Final     Creatinine   Date Value Ref Range Status   07/03/2020 0.82 0.66 - 1.25 mg/dL Final     GFR Estimate   Date Value Ref Range Status   07/03/2020 >90 >60 mL/min/[1.73_m2] Final     Comment:     Non  GFR Calc  Starting 12/18/2018, serum creatinine based estimated GFR (eGFR) will be   calculated using the Chronic Kidney Disease Epidemiology Collaboration   (CKD-EPI) equation.       Calcium   Date Value Ref Range Status   07/03/2020 8.9 8.5 - 10.1 mg/dL Final     Hemoglobin   Date Value Ref Range Status   07/03/2020 14.7 13.3 - 17.7 g/dL Final   ]        IMPRESSION:   Reason for surgery/procedure: Right knee arthroscopy  Diagnosis/reason for consult: pre-operative exam    The proposed surgical procedure is considered INTERMEDIATE risk.    REVISED CARDIAC RISK INDEX  The patient has the following serious cardiovascular risks for perioperative complications such as (MI, PE, VFib  and 3  AV Block):  No serious cardiac risks  INTERPRETATION: 0 risks: Class I (very low risk - 0.4% complication rate)    The patient has the following additional risks for perioperative complications:  Morbid obesity      ICD-10-CM    1. Preop general physical exam  Z01.818 Basic metabolic panel     Hemoglobin     EKG 12-lead complete w/read - Clinics   2. Knee injury, right, initial encounter  S89.91XA Basic metabolic panel     Hemoglobin   3. Essential hypertension with goal blood pressure less than 140/90  I10 lisinopril-hydrochlorothiazide (ZESTORETIC) 20-25 MG tablet     amLODIPine (NORVASC) 5 MG tablet     EKG 12-lead complete w/read - Clinics     Albumin Random Urine Quantitative with Creat Ratio  We reviewed how to take medications prior to surgery. I again reviewed with him the importance of cardiac risk reduction including smoking cessation, HTN control and weight loss       RECOMMENDATIONS:     --Consult hospital rounder / IM to assist post-op medical management    Obstructive Sleep Apnea (or suspected sleep apnea)  Patient is to bring their home CPAP with them on the day of surgery      --Patient is to take all scheduled medications on the day of surgery EXCEPT for modifications listed below.    ACE Inhibitor or Angiotensin Receptor Blocker (ARB) Use  Ace inhibitor or Angiotensin Receptor Blocker (ARB) and will continue this medication due to the higher risk of uncontrolled perioperative hypertension   He will hold his amlodipine     APPROVAL GIVEN to proceed with proposed procedure, without further diagnostic evaluation       Signed Electronically by: Kiel Georges PA-C    Copy of this evaluation report is provided to requesting physician.    [place] Preop Guidelines    Revised Cardiac Risk Index

## 2020-07-03 ENCOUNTER — OFFICE VISIT (OUTPATIENT)
Dept: FAMILY MEDICINE | Facility: CLINIC | Age: 30
End: 2020-07-03
Payer: COMMERCIAL

## 2020-07-03 VITALS
SYSTOLIC BLOOD PRESSURE: 132 MMHG | RESPIRATION RATE: 16 BRPM | HEIGHT: 68 IN | BODY MASS INDEX: 47.06 KG/M2 | TEMPERATURE: 98.4 F | OXYGEN SATURATION: 100 % | WEIGHT: 310.5 LBS | HEART RATE: 82 BPM | DIASTOLIC BLOOD PRESSURE: 92 MMHG

## 2020-07-03 DIAGNOSIS — I10 ESSENTIAL HYPERTENSION WITH GOAL BLOOD PRESSURE LESS THAN 140/90: ICD-10-CM

## 2020-07-03 DIAGNOSIS — Z01.818 PREOP GENERAL PHYSICAL EXAM: Primary | ICD-10-CM

## 2020-07-03 DIAGNOSIS — S89.91XA KNEE INJURY, RIGHT, INITIAL ENCOUNTER: ICD-10-CM

## 2020-07-03 LAB — HGB BLD-MCNC: 14.7 G/DL (ref 13.3–17.7)

## 2020-07-03 PROCEDURE — 80048 BASIC METABOLIC PNL TOTAL CA: CPT | Performed by: PHYSICIAN ASSISTANT

## 2020-07-03 PROCEDURE — 36415 COLL VENOUS BLD VENIPUNCTURE: CPT | Performed by: PHYSICIAN ASSISTANT

## 2020-07-03 PROCEDURE — 99215 OFFICE O/P EST HI 40 MIN: CPT | Performed by: PHYSICIAN ASSISTANT

## 2020-07-03 PROCEDURE — 82043 UR ALBUMIN QUANTITATIVE: CPT | Performed by: PHYSICIAN ASSISTANT

## 2020-07-03 PROCEDURE — 85018 HEMOGLOBIN: CPT | Performed by: PHYSICIAN ASSISTANT

## 2020-07-03 PROCEDURE — 93000 ELECTROCARDIOGRAM COMPLETE: CPT | Performed by: PHYSICIAN ASSISTANT

## 2020-07-03 RX ORDER — AMLODIPINE BESYLATE 5 MG/1
5 TABLET ORAL AT BEDTIME
Qty: 90 TABLET | Refills: 0 | Status: SHIPPED | OUTPATIENT
Start: 2020-07-03 | End: 2022-02-26

## 2020-07-03 RX ORDER — LISINOPRIL AND HYDROCHLOROTHIAZIDE 20; 25 MG/1; MG/1
1 TABLET ORAL DAILY
Qty: 90 TABLET | Refills: 0 | Status: SHIPPED | OUTPATIENT
Start: 2020-07-03 | End: 2022-02-26

## 2020-07-03 ASSESSMENT — PATIENT HEALTH QUESTIONNAIRE - PHQ9
SUM OF ALL RESPONSES TO PHQ QUESTIONS 1-9: 12
5. POOR APPETITE OR OVEREATING: NOT AT ALL

## 2020-07-03 ASSESSMENT — ANXIETY QUESTIONNAIRES
6. BECOMING EASILY ANNOYED OR IRRITABLE: MORE THAN HALF THE DAYS
7. FEELING AFRAID AS IF SOMETHING AWFUL MIGHT HAPPEN: NOT AT ALL
5. BEING SO RESTLESS THAT IT IS HARD TO SIT STILL: NOT AT ALL
2. NOT BEING ABLE TO STOP OR CONTROL WORRYING: NOT AT ALL
3. WORRYING TOO MUCH ABOUT DIFFERENT THINGS: NOT AT ALL
1. FEELING NERVOUS, ANXIOUS, OR ON EDGE: SEVERAL DAYS
GAD7 TOTAL SCORE: 3
IF YOU CHECKED OFF ANY PROBLEMS ON THIS QUESTIONNAIRE, HOW DIFFICULT HAVE THESE PROBLEMS MADE IT FOR YOU TO DO YOUR WORK, TAKE CARE OF THINGS AT HOME, OR GET ALONG WITH OTHER PEOPLE: VERY DIFFICULT

## 2020-07-03 ASSESSMENT — MIFFLIN-ST. JEOR: SCORE: 2357.92

## 2020-07-04 LAB
ANION GAP SERPL CALCULATED.3IONS-SCNC: 8 MMOL/L (ref 3–14)
BUN SERPL-MCNC: 12 MG/DL (ref 7–30)
CALCIUM SERPL-MCNC: 8.9 MG/DL (ref 8.5–10.1)
CHLORIDE SERPL-SCNC: 103 MMOL/L (ref 94–109)
CO2 SERPL-SCNC: 24 MMOL/L (ref 20–32)
CREAT SERPL-MCNC: 0.82 MG/DL (ref 0.66–1.25)
GFR SERPL CREATININE-BSD FRML MDRD: >90 ML/MIN/{1.73_M2}
GLUCOSE SERPL-MCNC: 85 MG/DL (ref 70–99)
POTASSIUM SERPL-SCNC: 4 MMOL/L (ref 3.4–5.3)
SODIUM SERPL-SCNC: 135 MMOL/L (ref 133–144)

## 2020-07-04 ASSESSMENT — ANXIETY QUESTIONNAIRES: GAD7 TOTAL SCORE: 3

## 2020-07-05 LAB
CREAT UR-MCNC: 68 MG/DL
MICROALBUMIN UR-MCNC: 39 MG/L
MICROALBUMIN/CREAT UR: 57.75 MG/G CR (ref 0–17)

## 2021-01-15 ENCOUNTER — HEALTH MAINTENANCE LETTER (OUTPATIENT)
Age: 31
End: 2021-01-15

## 2021-09-04 ENCOUNTER — HEALTH MAINTENANCE LETTER (OUTPATIENT)
Age: 31
End: 2021-09-04

## 2022-02-19 ENCOUNTER — HEALTH MAINTENANCE LETTER (OUTPATIENT)
Age: 32
End: 2022-02-19

## 2022-02-26 ENCOUNTER — HOSPITAL ENCOUNTER (EMERGENCY)
Facility: CLINIC | Age: 32
Discharge: HOME | End: 2022-02-26
Attending: EMERGENCY MEDICINE | Admitting: EMERGENCY MEDICINE
Payer: COMMERCIAL

## 2022-02-26 VITALS
BODY MASS INDEX: 47.74 KG/M2 | OXYGEN SATURATION: 97 % | HEART RATE: 87 BPM | HEIGHT: 68 IN | TEMPERATURE: 98.2 F | WEIGHT: 315 LBS | RESPIRATION RATE: 16 BRPM | SYSTOLIC BLOOD PRESSURE: 169 MMHG | DIASTOLIC BLOOD PRESSURE: 102 MMHG

## 2022-02-26 DIAGNOSIS — I10 ESSENTIAL HYPERTENSION WITH GOAL BLOOD PRESSURE LESS THAN 140/90: ICD-10-CM

## 2022-02-26 DIAGNOSIS — I10 HYPERTENSION, UNSPECIFIED TYPE: ICD-10-CM

## 2022-02-26 DIAGNOSIS — R04.0 EPISTAXIS: ICD-10-CM

## 2022-02-26 LAB
ANION GAP SERPL CALCULATED.3IONS-SCNC: 4 MMOL/L (ref 3–14)
BASOPHILS # BLD AUTO: 0.1 10E3/UL (ref 0–0.2)
BASOPHILS NFR BLD AUTO: 1 %
BUN SERPL-MCNC: 11 MG/DL (ref 7–30)
CALCIUM SERPL-MCNC: 9.6 MG/DL (ref 8.5–10.1)
CHLORIDE BLD-SCNC: 105 MMOL/L (ref 94–109)
CO2 SERPL-SCNC: 29 MMOL/L (ref 20–32)
CREAT SERPL-MCNC: 0.73 MG/DL (ref 0.66–1.25)
EOSINOPHIL # BLD AUTO: 0.2 10E3/UL (ref 0–0.7)
EOSINOPHIL NFR BLD AUTO: 4 %
ERYTHROCYTE [DISTWIDTH] IN BLOOD BY AUTOMATED COUNT: 12.1 % (ref 10–15)
GFR SERPL CREATININE-BSD FRML MDRD: >90 ML/MIN/1.73M2
GLUCOSE BLD-MCNC: 96 MG/DL (ref 70–99)
HCT VFR BLD AUTO: 46 % (ref 40–53)
HGB BLD-MCNC: 15.5 G/DL (ref 13.3–17.7)
HOLD SPECIMEN: NORMAL
HOLD SPECIMEN: NORMAL
IMM GRANULOCYTES # BLD: 0 10E3/UL
IMM GRANULOCYTES NFR BLD: 1 %
LYMPHOCYTES # BLD AUTO: 1.8 10E3/UL (ref 0.8–5.3)
LYMPHOCYTES NFR BLD AUTO: 27 %
MCH RBC QN AUTO: 28.2 PG (ref 26.5–33)
MCHC RBC AUTO-ENTMCNC: 33.7 G/DL (ref 31.5–36.5)
MCV RBC AUTO: 84 FL (ref 78–100)
MONOCYTES # BLD AUTO: 0.5 10E3/UL (ref 0–1.3)
MONOCYTES NFR BLD AUTO: 8 %
NEUTROPHILS # BLD AUTO: 4 10E3/UL (ref 1.6–8.3)
NEUTROPHILS NFR BLD AUTO: 59 %
NRBC # BLD AUTO: 0 10E3/UL
NRBC BLD AUTO-RTO: 0 /100
PLATELET # BLD AUTO: 231 10E3/UL (ref 150–450)
POTASSIUM BLD-SCNC: 4.1 MMOL/L (ref 3.4–5.3)
RBC # BLD AUTO: 5.5 10E6/UL (ref 4.4–5.9)
SODIUM SERPL-SCNC: 138 MMOL/L (ref 133–144)
WBC # BLD AUTO: 6.6 10E3/UL (ref 4–11)

## 2022-02-26 PROCEDURE — 36415 COLL VENOUS BLD VENIPUNCTURE: CPT | Performed by: EMERGENCY MEDICINE

## 2022-02-26 PROCEDURE — 250N000009 HC RX 250: Performed by: EMERGENCY MEDICINE

## 2022-02-26 PROCEDURE — 99284 EMERGENCY DEPT VISIT MOD MDM: CPT | Mod: 25

## 2022-02-26 PROCEDURE — 30901 CONTROL OF NOSEBLEED: CPT | Mod: RT

## 2022-02-26 PROCEDURE — 82310 ASSAY OF CALCIUM: CPT | Performed by: EMERGENCY MEDICINE

## 2022-02-26 PROCEDURE — 85025 COMPLETE CBC W/AUTO DIFF WBC: CPT | Performed by: EMERGENCY MEDICINE

## 2022-02-26 RX ORDER — AMLODIPINE BESYLATE 5 MG/1
5 TABLET ORAL AT BEDTIME
Qty: 30 TABLET | Refills: 0 | Status: SHIPPED | OUTPATIENT
Start: 2022-02-26 | End: 2022-03-11

## 2022-02-26 RX ORDER — LISINOPRIL AND HYDROCHLOROTHIAZIDE 20; 25 MG/1; MG/1
1 TABLET ORAL DAILY
Qty: 30 TABLET | Refills: 0 | Status: SHIPPED | OUTPATIENT
Start: 2022-02-26 | End: 2022-03-11

## 2022-02-26 RX ADMIN — SILVER NITRATE APPLICATORS 1 APPLICATOR: 25; 75 STICK TOPICAL at 16:42

## 2022-02-26 ASSESSMENT — ENCOUNTER SYMPTOMS
FEVER: 0
COUGH: 0

## 2022-02-26 NOTE — ED PROVIDER NOTES
"  History   Chief Complaint:  Hypertension    The history is provided by the patient.      Cristiano Casanova is a 31 year old male with history of hypertension who presents with concern of hypertension. He was seen at urgent care in [place] earlier today for nosebleeds; he has about 4 a day. The nosebleed resolved on its own at urgent care but was recommended to the ED for his high blood pressure. He has been on blood pressure medications since high school and reports of currently taking them. No recent measurement of blood pressure. Denies fever and cough.    Review of Systems   Constitutional: Negative for fever.   HENT: Positive for nosebleeds.    Respiratory: Negative for cough.      Allergies:  No Known Drug Allergies    Medications:  Amlodipine  Zestoretic  Lisinopril    Past Medical History:     Hypertension  Morbid obesity  MERRICK  Pes planus  Varicella  Plantar fascial fibromatosis    Past Surgical History:    Arthroscopic reconstruction anterior and posterior cruciate ligament, combined x2  Arthroscopy knee, right  Eye surgery x3    Family History:    The patient is adopted.    Social History:  The patient presented alone.  The patient arrived in a private vehicle.    Physical Exam     Patient Vitals for the past 24 hrs:   BP Temp Temp src Pulse Resp SpO2 Height Weight   02/26/22 1645 -- -- -- -- -- 97 % -- --   02/26/22 1630 -- -- -- -- -- 97 % -- --   02/26/22 1615 -- -- -- -- -- 95 % -- --   02/26/22 1600 (!) 169/102 -- -- 87 -- 94 % -- --   02/26/22 1545 (!) 177/97 -- -- 74 -- 97 % -- --   02/26/22 1530 (!) 177/111 -- -- 82 -- 98 % -- --   02/26/22 1515 (!) 165/99 -- -- 78 -- 93 % -- --   02/26/22 1433 (!) 233/128 98.2  F (36.8  C) Temporal 82 16 99 % 1.727 m (5' 8\") 145.2 kg (320 lb)     Physical Exam  Gen: well appearing, in no acute distress  Oral : Mucous membranes moist,   Nose: No rhinorhea, bilateral anterior septum excoriations, no active bleeding  Ears: External near normal, without " drainage  Eyes: periorbital tissues and sclera normal   Neck: supple, no abnormal swelling  Lungs:  CTAB,  no resp distress, speaks full sentences  CV: Regular rate, regular rhythm  Abd: soft, nontender, nondistended, no rebound/guarding  Ext: no lower extremity edema  Skin: warm, dry, well perfused, no rashes/bruising/lesions on exposed skin  Neuro: alert, no gross motor or sensory deficits,   Psych: pleasant mood, normal affect    Emergency Department Course   ECG  ECG obtained at 1439, ECG read at 1449  Normal sinus rhythm. Normal ECG.    Rate 83 bpm. VA interval 148 ms. QRS duration 80 ms. QT/QTc 340/399 ms. P-R-T axes 57 35 5.     Laboratory:  Labs Ordered and Resulted from Time of ED Arrival to Time of ED Departure   BASIC METABOLIC PANEL - Normal       Result Value    Sodium 138      Potassium 4.1      Chloride 105      Carbon Dioxide (CO2) 29      Anion Gap 4      Urea Nitrogen 11      Creatinine 0.73      Calcium 9.6      Glucose 96      GFR Estimate >90     CBC WITH PLATELETS AND DIFFERENTIAL    WBC Count 6.6      RBC Count 5.50      Hemoglobin 15.5      Hematocrit 46.0      MCV 84      MCH 28.2      MCHC 33.7      RDW 12.1      Platelet Count 231      % Neutrophils 59      % Lymphocytes 27      % Monocytes 8      % Eosinophils 4      % Basophils 1      % Immature Granulocytes 1      NRBCs per 100 WBC 0      Absolute Neutrophils 4.0      Absolute Lymphocytes 1.8      Absolute Monocytes 0.5      Absolute Eosinophils 0.2      Absolute Basophils 0.1      Absolute Immature Granulocytes 0.0      Absolute NRBCs 0.0          Procedures    Silver Nitrate Nasal Cautery     PROCEDURE NOTE: .  The location of the patient's bleeding in the left ant septum was identified and cauterized with silver nitrate.  The patient tolerated the procedure well and there were no complications.  He was observed in the emergency department following the procedure and had no recurrence of his bleeding.         Emergency Department  Course:    Reviewed:  I reviewed nursing notes, vitals, past medical history, Care Everywhere and [facility].    Assessments:  1501 I obtained history and examined the patient as noted above.   1634 I rechecked the patient and explained findings.     Interventions:  1642 Silver nitrate 1 applicator Topical    Disposition:  The patient was discharged to home.     Impression & Plan     CMS Diagnoses: None      Medical Decision Making:  Patient presents to ED with concerns over blood pressure. He's been having problems with nosebleeds over the last few days. Went into urgent care, he had hypertension so he was referred here. He has a history of hypertension, he used to be on amlodipine but that was stopped, he is still on lisinopril and hydrochlorothiazide although I did ask for a refill today. He had some excoriations predominately on the left naris which I did cauterize with silver nitrate. Will recommend saline as well. With regards to the patient blood pressure, it did settle a fair amount without any intervention. We discussed things and the patient was wondering if he could go back on the Norvasc because he feels his blood pressure has been more elevated recently. I'll go ahead and get him restarted on that. He asked for a refill of his lisinopril which I provided as well. I'll recommend outpatient follow up with PCP in the couple weeks for a recheck.    Critical Care Time: None    Diagnosis:    ICD-10-CM    1. Hypertension, unspecified type  I10    2. Epistaxis  R04.0    3. Essential hypertension with goal blood pressure less than 140/90  I10 amLODIPine (NORVASC) 5 MG tablet     lisinopril-hydrochlorothiazide (ZESTORETIC) 20-25 MG tablet       Scribe Disclosure:  I, Milvia Vasquez, am serving as a scribe at 2:55 PM on 2/26/2022 to document services personally performed by Lew Colon MD based on my observations and the provider's statements to me.       Lew Colon MD  02/26/22 0289

## 2022-02-26 NOTE — ED TRIAGE NOTES
Pt was at [facility] for concerns of a nosebleed. Patient is not on blood thinners. Nose bleed resolved on its own. Patient was sent to the ER due to high blood pressure. Patient takes Lisinopril BID and has been taking his medications as ordered. States has been on blood pressure medications since high school. Has not had headaches or any other concerns.

## 2022-02-28 LAB
ATRIAL RATE - MUSE: 83 BPM
DIASTOLIC BLOOD PRESSURE - MUSE: NORMAL MMHG
INTERPRETATION ECG - MUSE: NORMAL
P AXIS - MUSE: 57 DEGREES
PR INTERVAL - MUSE: 148 MS
QRS DURATION - MUSE: 80 MS
QT - MUSE: 340 MS
QTC - MUSE: 399 MS
R AXIS - MUSE: 35 DEGREES
SYSTOLIC BLOOD PRESSURE - MUSE: NORMAL MMHG
T AXIS - MUSE: 5 DEGREES
VENTRICULAR RATE- MUSE: 83 BPM

## 2022-03-11 ENCOUNTER — OFFICE VISIT (OUTPATIENT)
Dept: FAMILY MEDICINE | Facility: CLINIC | Age: 32
End: 2022-03-11
Payer: COMMERCIAL

## 2022-03-11 VITALS
DIASTOLIC BLOOD PRESSURE: 88 MMHG | BODY MASS INDEX: 48.94 KG/M2 | HEART RATE: 92 BPM | OXYGEN SATURATION: 97 % | TEMPERATURE: 98.6 F | WEIGHT: 315 LBS | SYSTOLIC BLOOD PRESSURE: 128 MMHG | RESPIRATION RATE: 20 BRPM

## 2022-03-11 DIAGNOSIS — Z23 HIGH PRIORITY FOR 2019-NCOV VACCINE: ICD-10-CM

## 2022-03-11 DIAGNOSIS — I10 HYPERTENSION GOAL BP (BLOOD PRESSURE) < 140/90: Primary | ICD-10-CM

## 2022-03-11 DIAGNOSIS — E66.01 MORBID OBESITY (H): ICD-10-CM

## 2022-03-11 DIAGNOSIS — G47.33 OSA (OBSTRUCTIVE SLEEP APNEA): ICD-10-CM

## 2022-03-11 LAB
ANION GAP SERPL CALCULATED.3IONS-SCNC: 8 MMOL/L (ref 3–14)
BUN SERPL-MCNC: 15 MG/DL (ref 7–30)
CALCIUM SERPL-MCNC: 10 MG/DL (ref 8.5–10.1)
CHLORIDE BLD-SCNC: 98 MMOL/L (ref 94–109)
CO2 SERPL-SCNC: 28 MMOL/L (ref 20–32)
CREAT SERPL-MCNC: 0.89 MG/DL (ref 0.66–1.25)
CREAT UR-MCNC: 107 MG/DL
GFR SERPL CREATININE-BSD FRML MDRD: >90 ML/MIN/1.73M2
GLUCOSE BLD-MCNC: 108 MG/DL (ref 70–99)
MICROALBUMIN UR-MCNC: 134 MG/L
MICROALBUMIN/CREAT UR: 125.23 MG/G CR (ref 0–17)
POTASSIUM BLD-SCNC: 3.9 MMOL/L (ref 3.4–5.3)
SODIUM SERPL-SCNC: 134 MMOL/L (ref 133–144)

## 2022-03-11 PROCEDURE — 36415 COLL VENOUS BLD VENIPUNCTURE: CPT | Performed by: PHYSICIAN ASSISTANT

## 2022-03-11 PROCEDURE — 0064A COVID-19,PF,MODERNA (18+ YRS BOOSTER .25ML): CPT | Performed by: PHYSICIAN ASSISTANT

## 2022-03-11 PROCEDURE — 99214 OFFICE O/P EST MOD 30 MIN: CPT | Performed by: PHYSICIAN ASSISTANT

## 2022-03-11 PROCEDURE — 80048 BASIC METABOLIC PNL TOTAL CA: CPT | Performed by: PHYSICIAN ASSISTANT

## 2022-03-11 PROCEDURE — 82043 UR ALBUMIN QUANTITATIVE: CPT | Performed by: PHYSICIAN ASSISTANT

## 2022-03-11 PROCEDURE — 91306 COVID-19,PF,MODERNA (18+ YRS BOOSTER .25ML): CPT | Performed by: PHYSICIAN ASSISTANT

## 2022-03-11 RX ORDER — AMLODIPINE BESYLATE 5 MG/1
5 TABLET ORAL AT BEDTIME
Qty: 90 TABLET | Refills: 1 | Status: SHIPPED | OUTPATIENT
Start: 2022-03-11 | End: 2024-07-31

## 2022-03-11 RX ORDER — LISINOPRIL AND HYDROCHLOROTHIAZIDE 20; 25 MG/1; MG/1
1 TABLET ORAL DAILY
Qty: 90 TABLET | Refills: 1 | Status: SHIPPED | OUTPATIENT
Start: 2022-03-11 | End: 2024-07-31

## 2022-03-11 ASSESSMENT — PAIN SCALES - GENERAL: PAINLEVEL: NO PAIN (0)

## 2022-03-11 NOTE — NURSING NOTE
"Chief Complaint   Patient presents with     ER F/U     Initial /88 (BP Location: Right arm, Patient Position: Sitting, Cuff Size: Adult Large)   Pulse 92   Temp 98.6  F (37  C) (Oral)   Resp 20   Wt 146 kg (321 lb 14.4 oz)   SpO2 97%   BMI 48.94 kg/m   Estimated body mass index is 48.94 kg/m  as calculated from the following:    Height as of 2/26/22: 1.727 m (5' 8\").    Weight as of this encounter: 146 kg (321 lb 14.4 oz).  BP completed using cuff size large right arm    Lisa Magill, CMA    "

## 2022-03-11 NOTE — PROGRESS NOTES
"  Assessment & Plan     Hypertension goal BP (blood pressure) < 140/90  Controlled on medication. Continue present management.   - amLODIPine (NORVASC) 5 MG tablet; Take 1 tablet (5 mg) by mouth At Bedtime  - lisinopril-hydrochlorothiazide (ZESTORETIC) 20-25 MG tablet; Take 1 tablet by mouth daily  - Albumin Random Urine Quantitative with Creat Ratio; Future  - Basic metabolic panel  (Ca, Cl, CO2, Creat, Gluc, K, Na, BUN); Future    Morbid obesity (H)  Reviewed impact on general health and HTN    MERRICK (obstructive sleep apnea)  Not using CPAP. Recommended he restart and risks of not wearing    High priority for 2019-nCoV vaccine  - COVID-19,PF,MODERNA (18+ Yrs BOOSTER .25mL)       Tobacco Cessation:   reports that he has been smoking cigarettes. He has been smoking about 0.50 packs per day. He has never used smokeless tobacco.      BMI:   Estimated body mass index is 48.94 kg/m  as calculated from the following:    Height as of 2/26/22: 1.727 m (5' 8\").    Weight as of this encounter: 146 kg (321 lb 14.4 oz).       Return in about 6 months (around 9/11/2022) for Follow up in 6 months for Medication Check.    Kiel Georges PA-C  [facility] DIANA Quinonez is a 31 year old who presents for the following health issues     HPI     ED/ Followup:    Facility:  [facility]   Date of visit: 02/26/2022  Reason for visit: NOSE BLEEDS   Current Status: NO CONCERNS.      Cristiano Casanova is a 31 year old male who presents today for BP check  Hed been off of medications for over a year   Was getting a lot of nose bleeds and finally seen at ED  Restarted former BP medications-- tolerated  No further nose bleeds  He has NOT been wearing his CPAP;       Review of Systems   Constitutional, HEENT, cardiovascular, pulmonary, gi and gu systems are negative, except as otherwise noted.      Objective    /88 (BP Location: Right arm, Patient Position: Sitting, Cuff Size: Adult Large)   " Pulse 92   Temp 98.6  F (37  C) (Oral)   Resp 20   Wt 146 kg (321 lb 14.4 oz)   SpO2 97%   BMI 48.94 kg/m    Body mass index is 48.94 kg/m .  Physical Exam   GENERAL: healthy, alert and no distress  EYES: Eyes grossly normal to inspection, PERRL and conjunctivae and sclerae normal  HENT: ear canals and TM's normal, nose and mouth without ulcers or lesions  RESP: lungs clear to auscultation - no rales, rhonchi or wheezes  CV: regular rates and rhythm, normal S1 S2, no S3 or S4 and no murmur, click or rub  MS: No peripheral edema   PSYCH: mentation appears normal, affect normal/bright

## 2022-03-14 ASSESSMENT — ANXIETY QUESTIONNAIRES
6. BECOMING EASILY ANNOYED OR IRRITABLE: MORE THAN HALF THE DAYS
7. FEELING AFRAID AS IF SOMETHING AWFUL MIGHT HAPPEN: NOT AT ALL
2. NOT BEING ABLE TO STOP OR CONTROL WORRYING: NOT AT ALL
5. BEING SO RESTLESS THAT IT IS HARD TO SIT STILL: NOT AT ALL
IF YOU CHECKED OFF ANY PROBLEMS ON THIS QUESTIONNAIRE, HOW DIFFICULT HAVE THESE PROBLEMS MADE IT FOR YOU TO DO YOUR WORK, TAKE CARE OF THINGS AT HOME, OR GET ALONG WITH OTHER PEOPLE: SOMEWHAT DIFFICULT
1. FEELING NERVOUS, ANXIOUS, OR ON EDGE: NOT AT ALL
3. WORRYING TOO MUCH ABOUT DIFFERENT THINGS: SEVERAL DAYS
GAD7 TOTAL SCORE: 3

## 2022-03-14 ASSESSMENT — PATIENT HEALTH QUESTIONNAIRE - PHQ9
5. POOR APPETITE OR OVEREATING: NOT AT ALL
SUM OF ALL RESPONSES TO PHQ QUESTIONS 1-9: 4

## 2022-03-15 ASSESSMENT — ANXIETY QUESTIONNAIRES: GAD7 TOTAL SCORE: 3

## 2022-10-16 ENCOUNTER — HEALTH MAINTENANCE LETTER (OUTPATIENT)
Age: 32
End: 2022-10-16

## 2023-06-01 ENCOUNTER — HEALTH MAINTENANCE LETTER (OUTPATIENT)
Age: 33
End: 2023-06-01

## 2023-06-06 ENCOUNTER — TRANSFERRED RECORDS (OUTPATIENT)
Dept: HEALTH INFORMATION MANAGEMENT | Facility: CLINIC | Age: 33
End: 2023-06-06
Payer: COMMERCIAL

## 2023-06-23 NOTE — PROGRESS NOTES
"Physical Therapy Evaluation   Time in: 1130  Time out: 1148  Total evaluation time: 18 minutes    Patient's Name: Georgiana Castro    Admitting Diagnosis  Altered mental state [R41 82]  Pneumonia [J18 9]  Weakness [R53 1]  Acute respiratory failure with hypoxia ([address] ) [J96 01]  Sepsis with acute hypoxic respiratory failure without septic shock, due to unspecified organism ([address] ) [A41 9, R65 20, J96 01]    Problem List  Patient Active Problem List   Diagnosis    SIRS (systemic inflammatory response syndrome) ([address] )    MONA (acute kidney injury) ([address] )    Hypokalemia    Hyperglycemia    Chronic respiratory failure with hypoxia (HCC)    Encephalopathy       Past Medical History  Past Medical History:   Diagnosis Date    Atrial fibrillation ([address] )     Diabetes mellitus ([address] )        Past Surgical History  History reviewed  No pertinent surgical history  PT performed at least 2 patient identifiers during session: Name and wristband  06/23/23 1148   PT Last Visit   PT Visit Date 06/23/23   Note Type   Note type Evaluation   Pain Assessment   Pain Assessment Tool 0-10   Pain Score No Pain   Restrictions/Precautions   Weight Bearing Precautions Per Order No   Other Precautions Chair Alarm; Fall Risk;O2  (3L start of session -> 1 L O2 via NC @ completion of session)   Home Living   Type of [address]  (2 CHINO from garage no HR (typically enters this way) or 1 from front porch)   [address] level;Performs ADLs on one level; Able to live on main level with bedroom/bathroom; Laundry in basement   [employer] Grab bars in shower; Shower chair   Home Equipment Walker;Quad cane  (did not use PTA although Pt reports \"i need to start using it\")   Additional Comments Pt reports living in a 1 story home with 2 or 1 CHINO in [place] [address]  Reports he is going to be visiting here until [facility]  Pt ambulates with no AD at baseline     Prior Function   Level of [place] " Patient contacted regarding PAP usage that has either dropped off below an average of 20 minutes per night or device has stopped reporting into Epic or vendor site.       Patient still has device : Yes    Last date device called in 8/19/2019    Last usage date 7/12/2019     Subjective measures:   Pt has been traveling and not using his device.  He acknowledges the importance of using the machine and does feel benefit from therapy when he is using it.  He does not feel any changes could be made at this time to improve is usage.        Current reporting of device:    Objective measures: 14 day rolling measures         Compliance  0 %        Average number of minutes 0      Action plan:  patient to restart machine         "Independent with ADLs; Independent with functional mobility   Lives With Alone   Receives Help From Family  (\"my sister and other family help alot\")   IADLs Independent with meal prep; Independent with driving; Independent with medication management  (\"i make a lot of microwave meals\")   Falls in the last 6 months 1 to 4  (1 fall)   General   Family/Caregiver Present Yes  (pt's sister)   Cognition   Overall Cognitive Status WFL   Arousal/Participation Alert   Attention Attends with cues to redirect   Orientation Level Oriented X4   Memory Within functional limits   Following Commands Follows one step commands without difficulty   Comments pt agreeable to PT session   Subjective   Subjective \"I can try and go for a little walk\"   RLE Assessment   RLE Assessment X   Strength RLE   RLE Overall Strength 4-/5   LLE Assessment   LLE Assessment X   Strength LLE   LLE Overall Strength 4-/5   Coordination   Movements are Fluid and Coordinated 0   Coordination and Movement Description + tremors noted in B UEs   Sensation WFL   Bed Mobility   Supine to Sit Unable to assess  (pt received OOB in recliner upon arrival)   Transfers   Sit to Stand   (CGA)   Additional items Assist x 1; Increased time required;Armrests; Verbal cues  (max VCs for hand placement)   Stand to Sit   (CGA)   Additional items Assist x 1; Increased time required   Ambulation/Elevation   Gait pattern Improper Weight shift; Wide HIRO; Forward Flexion;Decreased foot clearance; Step to;Excessively slow  (significant forward trunk flexion, pt requires max VCs for improving posture & maintaining safe distance away from walker during functional activity)   Gait Assistance   (CGA)   Additional items Assist x 1;Verbal cues; Tactile cues   Assistive Device Bariatric Rolling walker   Distance 12 ft x 2 with seated rest period btwn for rest d/t fatigue and SOB   Spo2 on RA during level surface amb = 91%   Stair Management Assistance Not tested   Balance   Static Sitting Fair + " "  Dynamic Sitting Fair +   Static Standing Fair   Dynamic Standing Fair -   Ambulatory Fair -   Endurance Deficit   Endurance Deficit Yes   Endurance Deficit Description +SOB/SAM, pt quickly fatigues   Activity Tolerance   Activity Tolerance Patient limited by fatigue   Medical Staff Made Aware coordination of care provided with OT [address] Yes, RN made aware of outcomes/recs   Assessment   Prognosis Fair   Problem List Decreased strength;Decreased endurance; Impaired balance;Decreased mobility; Impaired judgement;Decreased safety awareness   Assessment Pt is 69 y o  male seen for high-complexity PT evaluation on 6/23/2023 s/p admit to [address] on 6/22/2023 w/ \"feeling off\" for several days with + SOB and weakness  PT was consulted to assess pt's functional mobility and d/c needs  Order placed for PT eval and tx, w/ up and OOB as tolerated and ambulate patient orders  PTA, pt lives alone in [place], [place] s AD at baseline, (I) ADL performance  At time of eval, pt requires CG for transfers and amb x 12 ft x 2 with sitting rest period btwn  Upon evaluation, pt presenting with impaired functional mobility d/t decreased strength, decreased endurance, impaired balance, decreased mobility, impaired judgement and decreased safety awareness  Pertinent PMHx and current co-morbidities affecting pt's physical performance at time of assessment include: SIRS, MONA, hypokalemia, hyperglycemia, encephalopathy  Personal factors affecting pt at time of eval include: positive fall history, limited insight into impairments and increased age  The following objective measures performed on IE also reveal limitations: AM-PAC 6-Clicks: 15/46  Pt's clinical presentation is currently unstable/unpredictable seen in pt's presentation of need for input for task focus and mobility technique, ongoing medical assessment and supplemental O2 requirement when pt does not typically required O2 at baseline   " "Overall, pt's rehab potential and prognosis to return to PLOF is fair as impacted by objective findings, warranting pt to receive further skilled PT interventions to address identified impairments, activity limitation(s), and participation restriction(s)  Pt to benefit from continued PT tx to address deficits as defined above and maximize level of functional independent mobility and consistency in order for pt to improve activity tolerance  From PT/mobility standpoint, recommendation at time of d/c would be post acute rehabilitation services vs HHPT, pending progress in order to facilitate return to PLOF  Barriers to Discharge Inaccessible home environment;Decreased caregiver support   Goals   Patient Goals \"to feel better\"   STG Expiration Date 07/03/23   Short Term Goal #1 In 7-10 days: Increase bilateral LE strength 1/2 grade to facilitate independent mobility, Perform all bed mobility tasks modified independent to decrease caregiver burden, Perform all transfers modified independent to improve independence, Ambulate > 150 ft  with least restrictive assistive device modified independent w/o LOB and w/ normalized gait pattern 100% of the time, Navigate 1-2 stairs with close S with unilateral handrail to facilitate return to previous living environment, Increase all balance 1/2 grade to decrease risk for falls, Complete exercise program independently and Tolerate 4 hr OOB to faciliate upright tolerance   PT Treatment Day 0   Plan   Treatment/Interventions Functional transfer training;LE strengthening/ROM; Elevations; Therapeutic exercise; Endurance training;Patient/family training;Equipment eval/education; Bed mobility;Gait training;Spoke to nursing;Spoke to case management   PT Frequency 3-5x/wk   Recommendation   PT Discharge Recommendation Post acute rehabilitation services  (vs HHPT, pending progress)   Equipment Recommended Walker  (pt to benefit from continued RW use)   Additional Comments Pt's raw score on the " AM-PAC Basic Mobility inpatient short form is 17, standardized score is 39 67  Patients at this level are likely to benefit from DC to [facility], however, please refer to therapist recommendation for safe DC planning  AM-PAC Basic Mobility Inpatient   Turning in Flat Bed Without Bedrails 3   Lying on Back to Sitting on Edge of Flat Bed Without Bedrails 3   Moving Bed to Chair 3   Standing Up From Chair Using Arms 3   Walk in Room 3   Climb 3-5 Stairs With Railing 2   Basic Mobility Inpatient Raw Score 17   Basic Mobility Standardized Score 39 67   Highest Level Of Mobility   JH-HLM Goal 5: Stand one or more mins   JH-HLM Achieved 6: Walk 10 steps or more   End of Consult   Patient Position at End of Consult Bedside chair;Bed/Chair alarm activated; All needs within reach  (pt's sister visiting at bedside)       Gómez Calloway, PT, DPT

## 2023-07-26 ENCOUNTER — TRANSFERRED RECORDS (OUTPATIENT)
Dept: HEALTH INFORMATION MANAGEMENT | Facility: CLINIC | Age: 33
End: 2023-07-26

## 2024-06-14 ENCOUNTER — OFFICE VISIT (OUTPATIENT)
Dept: FAMILY MEDICINE | Facility: CLINIC | Age: 34
End: 2024-06-14
Payer: COMMERCIAL

## 2024-06-14 VITALS
WEIGHT: 315 LBS | OXYGEN SATURATION: 98 % | DIASTOLIC BLOOD PRESSURE: 119 MMHG | HEIGHT: 68 IN | SYSTOLIC BLOOD PRESSURE: 180 MMHG | BODY MASS INDEX: 47.74 KG/M2 | RESPIRATION RATE: 27 BRPM | HEART RATE: 96 BPM | TEMPERATURE: 98.1 F

## 2024-06-14 DIAGNOSIS — I10 HYPERTENSION GOAL BP (BLOOD PRESSURE) < 140/90: Primary | ICD-10-CM

## 2024-06-14 DIAGNOSIS — F43.23 ADJUSTMENT DISORDER WITH MIXED ANXIETY AND DEPRESSED MOOD: ICD-10-CM

## 2024-06-14 DIAGNOSIS — E66.01 MORBID OBESITY (H): ICD-10-CM

## 2024-06-14 DIAGNOSIS — Z23 NEED FOR VACCINATION: ICD-10-CM

## 2024-06-14 DIAGNOSIS — G47.33 OSA (OBSTRUCTIVE SLEEP APNEA): ICD-10-CM

## 2024-06-14 LAB
ANION GAP SERPL CALCULATED.3IONS-SCNC: 9 MMOL/L (ref 7–15)
BUN SERPL-MCNC: 12 MG/DL (ref 6–20)
CALCIUM SERPL-MCNC: 9.7 MG/DL (ref 8.6–10)
CHLORIDE SERPL-SCNC: 100 MMOL/L (ref 98–107)
CREAT SERPL-MCNC: 0.9 MG/DL (ref 0.67–1.17)
DEPRECATED HCO3 PLAS-SCNC: 29 MMOL/L (ref 22–29)
EGFRCR SERPLBLD CKD-EPI 2021: >90 ML/MIN/1.73M2
GLUCOSE SERPL-MCNC: 110 MG/DL (ref 70–99)
POTASSIUM SERPL-SCNC: 4.8 MMOL/L (ref 3.4–5.3)
SODIUM SERPL-SCNC: 138 MMOL/L (ref 135–145)

## 2024-06-14 PROCEDURE — 90715 TDAP VACCINE 7 YRS/> IM: CPT | Performed by: PHYSICIAN ASSISTANT

## 2024-06-14 PROCEDURE — 36415 COLL VENOUS BLD VENIPUNCTURE: CPT | Performed by: PHYSICIAN ASSISTANT

## 2024-06-14 PROCEDURE — 80048 BASIC METABOLIC PNL TOTAL CA: CPT | Performed by: PHYSICIAN ASSISTANT

## 2024-06-14 PROCEDURE — 90471 IMMUNIZATION ADMIN: CPT | Performed by: PHYSICIAN ASSISTANT

## 2024-06-14 PROCEDURE — 99214 OFFICE O/P EST MOD 30 MIN: CPT | Mod: 25 | Performed by: PHYSICIAN ASSISTANT

## 2024-06-14 RX ORDER — LISINOPRIL AND HYDROCHLOROTHIAZIDE 20; 25 MG/1; MG/1
2 TABLET ORAL DAILY
Qty: 180 TABLET | Refills: 0 | Status: SHIPPED | OUTPATIENT
Start: 2024-06-14

## 2024-06-14 RX ORDER — AMLODIPINE BESYLATE 5 MG/1
5 TABLET ORAL AT BEDTIME
Qty: 90 TABLET | Refills: 1 | Status: CANCELLED | OUTPATIENT
Start: 2024-06-14

## 2024-06-14 RX ORDER — LISINOPRIL AND HYDROCHLOROTHIAZIDE 20; 25 MG/1; MG/1
1 TABLET ORAL DAILY
Qty: 90 TABLET | Refills: 1 | Status: CANCELLED | OUTPATIENT
Start: 2024-06-14

## 2024-06-14 ASSESSMENT — PATIENT HEALTH QUESTIONNAIRE - PHQ9
SUM OF ALL RESPONSES TO PHQ QUESTIONS 1-9: 18
SUM OF ALL RESPONSES TO PHQ QUESTIONS 1-9: 18
10. IF YOU CHECKED OFF ANY PROBLEMS, HOW DIFFICULT HAVE THESE PROBLEMS MADE IT FOR YOU TO DO YOUR WORK, TAKE CARE OF THINGS AT HOME, OR GET ALONG WITH OTHER PEOPLE: VERY DIFFICULT

## 2024-06-14 NOTE — PROGRESS NOTES
"  Assessment & Plan     Hypertension goal BP (blood pressure) < 140/90  Not at all controlled. He has not historically been compliant. Thankfully he has quit smoking on Jan 1st. There are a lot of lifestyle changes to be make. We're going to restart below with close follow up. Will need repeat labs  - lisinopril-hydrochlorothiazide (ZESTORETIC) 20-25 MG tablet; Take 2 tablets by mouth daily  - Basic metabolic panel  (Ca, Cl, CO2, Creat, Gluc, K, Na, BUN); Future  - Basic metabolic panel  (Ca, Cl, CO2, Creat, Gluc, K, Na, BUN)    Adjustment disorder with mixed anxiety and depressed mood  Starting below. Discussed r/b/se  - FLUoxetine (PROZAC) 20 MG capsule; Take 1 capsule (20 mg) by mouth daily    MERRICK (obstructive sleep apnea)  Recommend recheck and discuss mask etc. I suspect he would feel a lot better if  his MERRICK were well treated  - Adult Sleep Eval & Management  Referral; Future    Morbid obesity (H)  Discussed lifestyle changes to help BP, MERRICK etc. We can consider medication as well but id like to first see that he will be diligent about returning for care. He will start to bring lunch instead of fast food, try to monitor portions and begin light activity    Need for vaccination  - TDAP 7+ (ADACEL,BOOSTRIX)          BMI  Estimated body mass index is 53.52 kg/m  as calculated from the following:    Height as of this encounter: 1.727 m (5' 7.99\").    Weight as of this encounter: 159.6 kg (351 lb 14.4 oz).       Depression Screening Follow Up        6/14/2024     9:10 AM   PHQ   PHQ-9 Total Score 18   Q9: Thoughts of better off dead/self-harm past 2 weeks Not at all           Subjective   Cristiano is a 33 year old, presenting for the following health issues:  Hypertension        6/14/2024     9:23 AM   Additional Questions   Roomed by JULIA BERRIOS   Accompanied by Self         6/14/2024     9:23 AM   Patient Reported Additional Medications   Patient reports taking the following new medications None     History of " "Present Illness       Hypertension: He presents for follow up of hypertension.  He does not check blood pressure  regularly outside of the clinic. Outside blood pressures have been over 140/90. He does not follow a low salt diet.     He eats 0-1 servings of fruits and vegetables daily.He consumes 2 sweetened beverage(s) daily.He exercises with enough effort to increase his heart rate 9 or less minutes per day.  He exercises with enough effort to increase his heart rate 3 or less days per week. He is missing 5 dose(s) of medications per week.     Cristiano Casanova is a 33 year old male who presents today for BP check  He recently discussed his health with his parents and his GF and felt like he needs to make some major changes  He mentions taking BP med only infrequently up until a few weeks ago   -just took the zestoretic and NOT amlodipine  Does NOT have any chest pains or HA  Some shortness of breath but only with physical activity  Denies vision changes  Urination is ok; no blood    Mental health  Mentions that for awhile now has really low motivation  Feels tired all of the time  After work he feels like there is a lack of desire to do things but lays on the couch  When he looks in the mirror he feels like he hates himself; denies SI  Rare ETOH now  No cannabis  Quit smoking January 1st of this year  Open to anything to get better    Using CPAP - trying every night; will come off in middle of night            Review of Systems  Constitutional, HEENT, cardiovascular, pulmonary, gi and gu systems are negative, except as otherwise noted.      Objective    BP (!) 180/119 (BP Location: Right arm, Patient Position: Sitting, Cuff Size: Adult Large)   Pulse 96   Temp 98.1  F (36.7  C) (Oral)   Resp 27   Ht 1.727 m (5' 7.99\")   Wt (!) 159.6 kg (351 lb 14.4 oz)   SpO2 98%   BMI 53.52 kg/m    Body mass index is 53.52 kg/m .  Physical Exam   GENERAL: alert and no distress  RESP: lungs clear to auscultation - no rales, " rhonchi or wheezes  CV: regular rate and rhythm, normal S1 S2, no S3 or S4, no murmur, click or rub, no peripheral edema  MS: No peripheral edema   PSYCH: mentation appears normal, affect normal/bright    Diagnostics: Updating today        Signed Electronically by: Kiel Georges PA-C

## 2024-06-14 NOTE — PATIENT INSTRUCTIONS
Schedule an appointment to recheck sleep apnea; Consider, if interested, going to a medical supply store to look at masks?    2.    Make a movement goal - what is an actual goal you think is possible for getting more active? (20 minute walk after you eat?)    3.    Monitor portions instead of content for diet; BRING YOUR LUNCH TO WORK    4.    Start the NEW BP dose    5.    See me again in 4-6 weeks

## 2024-07-30 NOTE — PROGRESS NOTES
Outpatient Sleep Medicine Consultation:      Name: Cristiano Casanova MRN# [id number]   Age: 33 year old YOB: 1990     Date of Consultation: July 30, 2024  Consultation is requested by: Kiel Georges PA-C  [id number] [place] [id number] Kiel Georges  Primary care provider: Kiel Georges       Reason for Sleep Consult:     Cristiano Casanova is sent by Kiel Georges for a sleep consultation regarding MERRICK.    Patient s Reason for visit  Cristiano Casanova main reason for visit: check up  Patient states problem(s) started:    Cristiano Casanova's goals for this visit:             Assessment and Plan:     Summary Sleep Diagnoses and Recommendations:  (G47.33) MERRICK (obstructive sleep apnea)  (primary encounter diagnosis)  Comment: Cristiano has been using CPAP 10-17 cm for moderate MERRICK. His compliance is low. He sometimes just does not put it on at bedtime. He denies any barriers to use. He does feel better when he uses it. His mask leak is very high (109 lpm). He has not replaced the mask in 6 months. He feels the pressure sometimes blows the mask off of his face. He did not bring his mask for us to test the fit. He has gained 60# since his last visit 5 years ago. His download shows his apnea is fairly well-controlled when he uses CPAP, but the high leak may make that unreliable. There is some risk for hypoxemia/hypoventilation given his BMI of 53. He denies morning headaches, has a daytime SpO2 of 95% and CO2 on metabolic panel of 29, so there is not a clear sign of hypoventilation. He may be interested in trying a nasal mask.   Plan: Comprehensive DME        Continue auto CPAP 10-17 cm. A prescription was written for new supplies. We reviewed recommendations for cleaning and replacing supplies.  He was encouraged to use the mask fit check function before going to bed. He was shown the AirFit N20 mask and a couple of full face masks to consider if he can't get a better seal. I asked him to  contact me once he has a better mask fit and I will order oximetry on CPAP. He was encouraged to increase his CPAP use. We reviewed health effects of untreated apnea. He will continue working with his primary provider on weight management.      (I10) Hypertension goal BP (blood pressure) < 140/90  Comment: 174/130 was the last reading today. He denies any headache, shortness of breath or chest pain. He states his pressures are never that high at home. He has a visit with his primary in 2 weeks.   Plan: I encouraged him to go to the ED if he has any of the above symptoms and continue tracking at home.       Comorbid Diagnoses:  BMI 53, HTN,     Summary Counseling:    Sleep Testing Reviewed  Obstructive Sleep Apnea Reviewed  Complications of Untreated Sleep Apnea Reviewed      Patient will follow up in 6 months (my next available).  Bennett Goltz, PA-C      Total time spent reviewing medical records, history and physical examination, review of previous testing and interpretation as well as documentation on this date:56 min    CC: Kiel Georges          History of Present Illness:     Cristiano Casanova presents to re-establish care for MERRICK.   He was last seen in 2018 by Dr. Chua. He has been on a ResMed Airsense 10 auto CPAP 10-17 cm (obtained 12/2017). He was encouraged by his primary to establish care again. He tries to use CPAP as much as he can. It makes a huge difference. He uses a full face mask, [employer]. He last replaced his mask about 6 months ago. He uses [employer]. The pressure sometimes feels high. It pushes the mask off of his face. He does notice mask leak. The pillow may push on the mask. He is not aware of snoring with CPAP. He sometimes gets dry mouth in the morning. He does use the humidifier. It does not run out overnight. He has gained 60# since his last visit in 2018.     The compliance data shows that the patient used the CPAP for 22/30 nights, 57% of nights for >4 hours.  The  95th% pressure is 15 cm.  The 95th% leak is 109.8 lpm.  The average nightly usage is 4:57.  The average AHI is 5.7/hr (3.3/hr are unknown).     Past Sleep Evaluations: 8/17/2010 [facility] in [place]. AHI of 21 events per hour, RDI of 38.6 events per hour, and SpO2 zayra of 80%. In 6/19/2012 (Wt: 261#), the patient had a PSG titration study and CPAP 12 cm was effective.    SLEEP-WAKE SCHEDULE:     Work/School Days: Patient goes to school/work: Yes   Usually gets into bed at  10-11 PM  Takes patient about ten minutes to fall asleep  Has trouble falling asleep 1 nights per week  Wakes up in the middle of the night 0 times, with CPAP.  Wakes up due to Use the bathroom  He has trouble falling back asleep   times a week.   It usually takes   to get back to sleep  Patient is usually up at 5 or 6  Uses alarm: Yes    Weekends/Non-work Days/All Other Days:  Usually gets into bed at 12 AM   Takes patient about 10 minutes to fall asleep  Patient is usually up at 8 AM  Uses alarm:      Sleep Need  Patient gets  6 hours sleep on average   Patient thinks he needs about 7 sleep    Cristiano Casanova prefers to sleep in this position(s): Side;Stomach   Patient states they do the following activities in bed: Read;Use phone, computer, or tablet (30 min)    Naps  Patient takes a purposeful nap 2-3  times a week and naps are usually 20 in duration, after work  He feels better after a nap: Yes  He dozes off unintentionally 0 days per week  Patient has had a driving accident or near-miss due to sleepiness/drowsiness: No      SLEEP DISRUPTIONS:    Breathing/Snoring  Patient snores:Yes without CPAP  Other people complain about his snoring: Yes  Patient has been told he stops breathing in his sleep:Yes  He has issues with the following:  . no morning headaches. no nocturnal heartburn or reflux. no nasal congestion at night.    Movement:  Patient gets pain, discomfort, with an urge to move:  No restless legs symptoms  It happens when he is resting:   No  It happens more at night:  No  Patient has been told he kicks his legs at night:  No     Behaviors in Sleep:  Cristiano Casanova has experienced the following behaviors while sleeping:    Pt denies bruxism, sleep talking, sleep walking, and dream enactment behavior. Pt denies sleep paralysis, hypnagogue and cataplexy.       Is there anything else you would like your sleep provider to know:        CAFFEINE AND OTHER SUBSTANCES:    Patient consumes caffeinated beverages per day:  0  Last caffeine use is usually:    List of any prescribed or over the counter stimulants that patient takes:    List of any prescribed or over the counter sleep medication patient takes:    List of previous sleep medications that patient has tried:    Patient drinks alcohol to help them sleep: No  Patient drinks alcohol near bedtime: No    Family History:  Patient has a family member been diagnosed with a sleep disorder:      Adopted, unknown    Social History:  He works in [employer] sheet [employer]  He lives with a roommate    SCALES:    EPWORTH SLEEPINESS SCALE         7/31/2024    12:51 PM    [place] Sleepiness Scale ( CYNTHIA Rendon  [id number]<br>ESS - [place]/English - Final version - 21 Nov 07 - [facility] [place].)   Sitting and reading Would never doze   Watching TV Slight chance of dozing   Sitting, inactive in a public place (e.g. a theatre or a meeting) Would never doze   As a passenger in a car for an hour without a break Slight chance of dozing   Lying down to rest in the afternoon when circumstances permit Slight chance of dozing   Sitting and talking to someone Would never doze   Sitting quietly after a lunch without alcohol Would never doze   In a car, while stopped for a few minutes in traffic Would never doze   [place] Score (MC) 3   [place] Score (Sleep) 3         INSOMNIA SEVERITY INDEX (KRYSTIAN)          7/31/2024    12:46 PM   Insomnia Severity Index (KRYSTIAN)   Difficulty falling asleep 1   Difficulty staying asleep 1  "  Problems waking up too early 0   How SATISFIED/DISSATISFIED are you with your CURRENT sleep pattern? 2   How NOTICEABLE to others do you think your sleep problem is in terms of impairing the quality of your life? 2   How WORRIED/DISTRESSED are you about your current sleep problem? 2   To what extent do you consider your sleep problem to INTERFERE with your daily functioning (e.g. daytime fatigue, mood, ability to function at work/daily chores, concentration, memory, mood, etc.) CURRENTLY? 1   KRYSTIAN Total Score 9       Guidelines for Scoring/Interpretation:  Total score categories:  0-7 = No clinically significant insomnia   8-14 = Subthreshold insomnia   15-21 = Clinical insomnia (moderate severity)  22-28 = Clinical insomnia (severe)  Used via courtesy of www.Swarmth.va.gov with permission from Josiah Blanchard PhD., [facility]      STOP BANG         7/31/2024     1:10 PM   STOP BANG Questionnaire (  2008, the American Society of Anesthesiologists, Inc. [employer].)   [facility]: 189/125         GAD7        3/14/2022     7:18 AM   NENA-7    1. Feeling nervous, anxious, or on edge 0   2. Not being able to stop or control worrying 0   3. Worrying too much about different things 1   4. Trouble relaxing 0   5. Being so restless that it is hard to sit still 0   6. Becoming easily annoyed or irritable 2   7. Feeling afraid, as if something awful might happen 0   NENA-7 Total Score 3   If you checked any problems, how difficult have they made it for you to do your work, take care of things at home, or get along with other people? Somewhat difficult         CAGE-AID         No data to display                CAGE-AID reprinted with permission from the [place] Medical Journal, RICH Davidson. and RENÉE Bee, \"Conjoint screening questionnaires for alcohol and drug abuse\" [place] Medical Journal 94: 135-140, 1995.      PATIENT HEALTH QUESTIONNAIRE-9 (PHQ - 9)        6/14/2024     9:10 AM   PHQ-9 " ([employer])   1.  Little interest or pleasure in doing things 3   2.  Feeling down, depressed, or hopeless 2   3.  Trouble falling or staying asleep, or sleeping too much 3   4.  Feeling tired or having little energy 3   5.  Poor appetite or overeating 3   6.  Feeling bad about yourself - or that you are a failure or have let yourself or your family down 2   7.  Trouble concentrating on things, such as reading the newspaper or watching television 1   8.  Moving or speaking so slowly that other people could have noticed. Or the opposite - being so fidgety or restless that you have been moving around a lot more than usual 1   9.  Thoughts that you would be better off dead, or of hurting yourself in some way 0   PHQ-9 Total Score 18   6.  Feeling bad about yourself 2   7.  Trouble concentrating 1   8.  Moving slowly or restless 1   9.  Suicidal or self-harm thoughts 0   1.  Little interest or pleasure in doing things Nearly every day   2.  Feeling down, depressed, or hopeless More than half the days   3.  Trouble falling or staying asleep, or sleeping too much Nearly every day   4.  Feeling tired or having little energy Nearly every day   5.  Poor appetite or overeating Nearly every day   6.  Feeling bad about yourself More than half the days   7.  Trouble concentrating Several days   8.  Moving slowly or restless Several days   9.  Suicidal or self-harm thoughts Not at all   PHQ-9 via [facility] TOTAL SCORE-----> 18 (Moderately severe depression)   Difficulty at work, home, or with people Very difficult       Developed by Arlene Campoverde, Amanda Redman, Jeramie Porter and colleagues, with an educational jeffy from [employer]. No permission required to reproduce, translate, display or distribute.        Allergies:    Allergies   Allergen Reactions    No Known Drug Allergy        Medications:    Current Outpatient Medications   Medication Sig Dispense Refill    FLUoxetine (PROZAC) 20 MG capsule Take 1 capsule (20  mg) by mouth daily 90 capsule 0    lisinopril-hydrochlorothiazide (ZESTORETIC) 20-25 MG tablet Take 2 tablets by mouth daily 180 tablet 0       Problem List:  Patient Active Problem List    Diagnosis Date Noted    Morbid obesity (H) 2017     Priority: Medium    Acute pain of right knee 2017     Priority: Medium    Aftercare following surgery of the musculoskeletal system 2017     Priority: Medium    MERRICK (obstructive sleep apnea) 2017     Priority: Medium    CARDIOVASCULAR SCREENING; LDL GOAL LESS THAN 160 2012     Priority: Medium    Hypertension goal BP (blood pressure) < 140/90 2011     Priority: Medium    Obesity 2010     Priority: Medium    Pes planus 2006     Priority: Medium     Problem list name updated by automated process. Provider to review      Plantar fascial fibromatosis 2006     Priority: Medium    Pain in limb 2006     Priority: Medium        Past Medical/Surgical History:  Past Medical History:   Diagnosis Date    Hypertension     Obesity     Sleep apnea      Past Surgical History:   Procedure Laterality Date    ARTHROSCOPIC RECONSTRUCTION ANTERIOR AND POSTERIOR CRUCIATE LIGAMENT, COMBINED  2009    x2    ARTHROSCOPY KNEE Right 3/1/2017    Procedure: ARTHROSCOPY KNEE;  Surgeon: Abdelrahman Cui MD;  Location: [facility] OR    EYE SURGERY      x3       Social History:  Social History     Socioeconomic History    Marital status: Single     Spouse name: Not on file    Number of children: Not on file    Years of education: Not on file    Highest education level: Not on file   Occupational History     Employer: [employer]UDENT     Comment: [employer]    Occupation: [employer]     Employer: [employer]   Tobacco Use    Smoking status: Former     Current packs/day: 0.00     Types: Cigarettes     Quit date: 2024     Years since quittin.5    Smokeless tobacco: Never    Tobacco comments:     now smoking only 1/2 ppd   Vaping Use    Vaping  "status: Never Used   Substance and Sexual Activity    Alcohol use: Yes     Alcohol/week: 0.0 standard drinks of alcohol     Comment: 1-2 beers every couple of weeks.     Drug use: No    Sexual activity: Never   Other Topics Concern    Parent/sibling w/ CABG, MI or angioplasty before 65F 55M? No   Social History Narrative    Not on file     Social Determinants of Health     Financial Resource Strain: Not on file   Food Insecurity: Not on file   Transportation Needs: Not on file   Physical Activity: Not on file   Stress: Not on file   Social Connections: Not on file   Interpersonal Safety: Low Risk  (6/14/2024)    Interpersonal Safety     Do you feel physically and emotionally safe where you currently live?: Yes     Within the past 12 months, have you been hit, slapped, kicked or otherwise physically hurt by someone?: No     Within the past 12 months, have you been humiliated or emotionally abused in other ways by your partner or ex-partner?: No   Housing Stability: Not on file       Family History:  Family History   Adopted: Yes   Family history unknown: Yes       Review of Systems:  A complete review of systems reviewed by me is negative with the exeption of what has been mentioned in the history of present illness.        Physical Examination:  Vitals: BP (!) 189/125   Pulse 109   Ht 1.727 m (5' 8\")   Wt (!) 159.7 kg (352 lb)   SpO2 95%   BMI 53.52 kg/m    BMI= Body mass index is 53.52 kg/m .    Neck Cir (cm): 55 cm      GENERAL APPEARANCE: healthy, alert, no distress, and cooperative  EYES: Eyes grossly normal to inspection, PERRL, conjunctivae and sclerae normal, and lids and lashes normal  HENT: oropharynx crowded, tongue base enlarged, and tonsillar hypertrophy  NECK: no adenopathy, no asymmetry, masses, or scars, thyroid normal to palpation, and trachea midline and normal to palpation  RESP: lungs clear to auscultation - no rales, rhonchi or wheezes  CV: regular rates and rhythm, no murmur, click or rub, " "and no irregular beats  LYMPHATICS: no cervical adenopathy  MS: pitting 1+ lower extremity edema bilaterally  NEURO: Normal strength and tone, mentation intact, and speech normal  Mallampati Class: IV.  Tonsillar Stage: 3  extending beyond pillars.         Data: All pertinent previous laboratory data reviewed     Recent Labs   Lab Test 06/14/24  1016 03/11/22  0957    134   POTASSIUM 4.8 3.9   CHLORIDE 100 98   CO2 29 28   ANIONGAP 9 8   * 108*   BUN 12.0 15   CR 0.90 0.89   BRINA 9.7 10.0       Recent Labs   Lab Test 02/26/22  1506   WBC 6.6   RBC 5.50   HGB 15.5   HCT 46.0   MCV 84   MCH 28.2   MCHC 33.7   RDW 12.1          No results for input(s): \"PROTTOTAL\", \"ALBUMIN\", \"BILITOTAL\", \"ALKPHOS\", \"AST\", \"ALT\", \"BILIDIRECT\" in the last 52677 hours.    TSH   Date Value   09/06/2011 0.68 mU/L   02/24/2009 0.56 mcU/mL       No results found for: \"UAMP\", \"UBARB\", \"BENZODIAZEUR\", \"UCANN\", \"UCOC\", \"OPIT\", \"UPCP\"    No results found for: \"IRONSAT\", \"LX95547\", \"HOOD\"    No results found for: \"PH\", \"PHARTERIAL\", \"PO2\", \"FN6VYMBJDDC\", \"SAT\", \"PCO2\", \"HCO3\", \"BASEEXCESS\", \"KIKE\", \"BEB\"    @LABRCNTIPR(phv:4,pco2v:4,po2v:4,hco3v:4,neri:4,o2per:4)@    Echocardiology: No results found for this or any previous visit (from the past 4320 hour(s)).    Chest x-ray: No results found for this or any previous visit from the past 365 days.      Chest CT: No results found for this or any previous visit from the past 365 days.      PFT: Most Recent [facility] Pulmonary Function Testing    No results found for: \"20001\"        Bennett Ezra Goltz, PA-C, [place] 7/30/2024          "

## 2024-07-31 ENCOUNTER — OFFICE VISIT (OUTPATIENT)
Dept: SLEEP MEDICINE | Facility: CLINIC | Age: 34
End: 2024-07-31
Attending: PHYSICIAN ASSISTANT
Payer: COMMERCIAL

## 2024-07-31 VITALS
HEIGHT: 68 IN | WEIGHT: 315 LBS | SYSTOLIC BLOOD PRESSURE: 174 MMHG | OXYGEN SATURATION: 95 % | DIASTOLIC BLOOD PRESSURE: 130 MMHG | HEART RATE: 109 BPM | BODY MASS INDEX: 47.74 KG/M2

## 2024-07-31 DIAGNOSIS — G47.33 OSA (OBSTRUCTIVE SLEEP APNEA): Primary | ICD-10-CM

## 2024-07-31 DIAGNOSIS — I10 HYPERTENSION GOAL BP (BLOOD PRESSURE) < 140/90: ICD-10-CM

## 2024-07-31 PROCEDURE — 99204 OFFICE O/P NEW MOD 45 MIN: CPT | Performed by: PHYSICIAN ASSISTANT

## 2024-07-31 PROCEDURE — G2211 COMPLEX E/M VISIT ADD ON: HCPCS | Performed by: PHYSICIAN ASSISTANT

## 2024-07-31 ASSESSMENT — SLEEP AND FATIGUE QUESTIONNAIRES
HOW LIKELY ARE YOU TO NOD OFF OR FALL ASLEEP WHILE LYING DOWN TO REST IN THE AFTERNOON WHEN CIRCUMSTANCES PERMIT: SLIGHT CHANCE OF DOZING
HOW LIKELY ARE YOU TO NOD OFF OR FALL ASLEEP WHILE SITTING QUIETLY AFTER LUNCH WITHOUT ALCOHOL: WOULD NEVER DOZE
HOW LIKELY ARE YOU TO NOD OFF OR FALL ASLEEP WHILE SITTING AND TALKING TO SOMEONE: WOULD NEVER DOZE
HOW LIKELY ARE YOU TO NOD OFF OR FALL ASLEEP WHILE SITTING INACTIVE IN A PUBLIC PLACE: WOULD NEVER DOZE
HOW LIKELY ARE YOU TO NOD OFF OR FALL ASLEEP WHEN YOU ARE A PASSENGER IN A CAR FOR AN HOUR WITHOUT A BREAK: SLIGHT CHANCE OF DOZING
HOW LIKELY ARE YOU TO NOD OFF OR FALL ASLEEP IN A CAR, WHILE STOPPED FOR A FEW MINUTES IN TRAFFIC: WOULD NEVER DOZE
HOW LIKELY ARE YOU TO NOD OFF OR FALL ASLEEP WHILE WATCHING TV: SLIGHT CHANCE OF DOZING
HOW LIKELY ARE YOU TO NOD OFF OR FALL ASLEEP WHILE SITTING AND READING: WOULD NEVER DOZE

## 2024-07-31 NOTE — NURSING NOTE
"Chief Complaint   Patient presents with    Sleep Problem     Reestablish care       Initial BP (!) 177/127   Pulse 109   Ht 1.727 m (5' 8\")   Wt (!) 159.7 kg (352 lb)   SpO2 95%   BMI 53.52 kg/m   Estimated body mass index is 53.52 kg/m  as calculated from the following:    Height as of this encounter: 1.727 m (5' 8\").    Weight as of this encounter: 159.7 kg (352 lb).    Medication Reconciliation: complete  ESS 3  Neck circumference: 55 centimeters.  Aimee Christy MA   "
Self

## 2024-08-10 ENCOUNTER — HEALTH MAINTENANCE LETTER (OUTPATIENT)
Age: 34
End: 2024-08-10

## 2024-11-14 ENCOUNTER — OFFICE VISIT (OUTPATIENT)
Dept: FAMILY MEDICINE | Facility: CLINIC | Age: 34
End: 2024-11-14
Payer: COMMERCIAL

## 2024-11-14 ENCOUNTER — MYC MEDICAL ADVICE (OUTPATIENT)
Dept: FAMILY MEDICINE | Facility: CLINIC | Age: 34
End: 2024-11-14

## 2024-11-14 VITALS
WEIGHT: 315 LBS | SYSTOLIC BLOOD PRESSURE: 172 MMHG | BODY MASS INDEX: 47.74 KG/M2 | RESPIRATION RATE: 15 BRPM | DIASTOLIC BLOOD PRESSURE: 121 MMHG | TEMPERATURE: 98 F | HEIGHT: 68 IN | OXYGEN SATURATION: 98 % | HEART RATE: 97 BPM

## 2024-11-14 DIAGNOSIS — I10 HYPERTENSION GOAL BP (BLOOD PRESSURE) < 140/90: Primary | ICD-10-CM

## 2024-11-14 DIAGNOSIS — F43.23 ADJUSTMENT DISORDER WITH MIXED ANXIETY AND DEPRESSED MOOD: ICD-10-CM

## 2024-11-14 LAB
ANION GAP SERPL CALCULATED.3IONS-SCNC: 10 MMOL/L (ref 7–15)
BUN SERPL-MCNC: 11.2 MG/DL (ref 6–20)
CALCIUM SERPL-MCNC: 9.9 MG/DL (ref 8.8–10.4)
CHLORIDE SERPL-SCNC: 100 MMOL/L (ref 98–107)
CREAT SERPL-MCNC: 0.88 MG/DL (ref 0.67–1.17)
EGFRCR SERPLBLD CKD-EPI 2021: >90 ML/MIN/1.73M2
GLUCOSE SERPL-MCNC: 104 MG/DL (ref 70–99)
HCO3 SERPL-SCNC: 27 MMOL/L (ref 22–29)
POTASSIUM SERPL-SCNC: 4.5 MMOL/L (ref 3.4–5.3)
SODIUM SERPL-SCNC: 137 MMOL/L (ref 135–145)

## 2024-11-14 PROCEDURE — 36415 COLL VENOUS BLD VENIPUNCTURE: CPT | Performed by: PHYSICIAN ASSISTANT

## 2024-11-14 PROCEDURE — 80048 BASIC METABOLIC PNL TOTAL CA: CPT | Performed by: PHYSICIAN ASSISTANT

## 2024-11-14 PROCEDURE — G2211 COMPLEX E/M VISIT ADD ON: HCPCS | Performed by: PHYSICIAN ASSISTANT

## 2024-11-14 PROCEDURE — 99214 OFFICE O/P EST MOD 30 MIN: CPT | Performed by: PHYSICIAN ASSISTANT

## 2024-11-14 RX ORDER — LISINOPRIL AND HYDROCHLOROTHIAZIDE 20; 25 MG/1; MG/1
2 TABLET ORAL DAILY
Qty: 180 TABLET | Refills: 0 | Status: SHIPPED | OUTPATIENT
Start: 2024-11-14

## 2024-11-14 ASSESSMENT — PAIN SCALES - GENERAL: PAINLEVEL_OUTOF10: NO PAIN (0)

## 2024-11-14 NOTE — PROGRESS NOTES
"  Assessment & Plan     Hypertension goal BP (blood pressure) < 140/90  Adjustment disorder with mixed anxiety and depressed mood  UNfortunately once again Cristiano did not return after restarting medication so we have no idea of the BP was controlled of if there were renal/lyte changes. He does think mood was improved on fluoxetine. We're going to again start below and ill send him a mychart note in 3 weeks to schedule follow up nurse BP/lab draw. Thankfully he is getting more compliant with his CPAP and that should help as well  - Echocardiogram Complete; Future  - lisinopril-hydrochlorothiazide (ZESTORETIC) 20-25 MG tablet; Take 2 tablets by mouth daily.  - Basic metabolic panel  (Ca, Cl, CO2, Creat, Gluc, K, Na, BUN); Future  - FLUoxetine (PROZAC) 20 MG capsule; Take 1 capsule (20 mg) by mouth daily.    The longitudinal plan of care for the diagnosis(es)/condition(s) as documented were addressed during this visit. Due to the added complexity in care, I will continue to support Cristiano in the subsequent management and with ongoing continuity of care.        BMI  Estimated body mass index is 52.46 kg/m  as calculated from the following:    Height as of this encounter: 1.727 m (5' 8\").    Weight as of this encounter: 156.5 kg (345 lb).           Subjective   Cristiano is a 34 year old, presenting for the following health issues:  Follow Up        11/14/2024     6:43 AM   Additional Questions   Roomed by MR   Accompanied by ARMEN         11/14/2024     6:43 AM   Patient Reported Additional Medications   Patient reports taking the following new medications NA     History of Present Illness       Hypertension: He presents for follow up of hypertension.  He does not check blood pressure  regularly outside of the clinic. Outpatient blood pressures have not been over 140/90. He does not follow a low salt diet.     He eats 2-3 servings of fruits and vegetables daily.He consumes 0 sweetened beverage(s) daily.He exercises with enough " "effort to increase his heart rate 20 to 29 minutes per day.  He exercises with enough effort to increase his heart rate 4 days per week. He is missing 2 dose(s) of medications per week.       Depression and Anxiety   How are you doing with your depression since your last visit? Stable  How are you doing with your anxiety since your last visit?  stable  Are you having other symptoms that might be associated with depression or anxiety? No  Have you had a significant life event? No   Do you have any concerns with your use of alcohol or other drugs? No    Social History     Tobacco Use    Smoking status: Former     Current packs/day: 0.00     Types: Cigarettes     Quit date: 2024     Years since quittin.8    Smokeless tobacco: Never    Tobacco comments:     Smoked about 12 years   Vaping Use    Vaping status: Never Used   Substance Use Topics    Alcohol use: Yes     Alcohol/week: 0.0 standard drinks of alcohol     Comment: 1-2 beers every couple of weeks.     Drug use: No         7/3/2020     4:24 PM 3/14/2022     7:18 AM 2024     9:10 AM   PHQ   PHQ-9 Total Score 12 4 18   Q9: Thoughts of better off dead/self-harm past 2 weeks Not at all Not at all Not at all        Patient-reported         7/3/2020     4:24 PM 3/14/2022     7:18 AM   NENA-7 SCORE   Total Score 3 3     Cristiano Casanova is a 34 year old male who presents today for HTN/medication follow up    He has recently changed to a nasal mask for CPAP and this is helping quite a bit with compliance  Now wearing most nights; still taking off at night on occasion    Fluoxetine has been helpful but he never returned for follow up and so just stopped  When he was taking he felt much less irritable; much better mood  Would be interested restarting    He has not been on blood pressure medication for \"a few weeks\"  No side effects when we went back on the medication however  Never returned for labs or BP recheck until now, 5 months later  Denies any chest pain or " "shortness of breath; no vision change  Occasional VANEGAS    He cannot say why he doesn't come back for med check      Review of Systems  Constitutional, HEENT, cardiovascular, pulmonary, gi and gu systems are negative, except as otherwise noted.      Objective    BP (!) 172/121 (BP Location: Right arm, Patient Position: Sitting, Cuff Size: Adult Large)   Pulse 97   Temp 98  F (36.7  C) (Oral)   Resp 15   Ht 1.727 m (5' 8\")   Wt (!) 156.5 kg (345 lb)   SpO2 98%   BMI 52.46 kg/m    Body mass index is 52.46 kg/m .  Physical Exam   GENERAL: alert and no distress  EYES: Eyes grossly normal to inspection, PERRL and conjunctivae and sclerae normal  RESP: lungs clear to auscultation - no rales, rhonchi or wheezes  CV: regular rate and rhythm, normal S1 S2, no S3 or S4, no murmur, click or rub, no peripheral edema  MS: no peripheral edema  PSYCH: mentation appears normal, affect normal/bright            Signed Electronically by: Kiel Georges PA-C    "

## 2024-12-09 NOTE — TELEPHONE ENCOUNTER
Notification received that patient has not read [employer] message.     Called patient, left voicemail, and asked patient to call back. Attempt # 1.     Zena Colbert RN 12/9/2024  [facility]

## 2024-12-09 NOTE — TELEPHONE ENCOUNTER
Pt returning our call. Says that he is out of town for 2 weeks, so requesting appointment in the week on 12/23. Also, requesting appointment at [place] location.    Scheduled nurse-only appointment on 12/23 at 4 pm & lab-only appointment at 4:30 pm.     Huey - please sign the needed labs that you would like to recheck. Thanks.    Artem CHAVEZ  Clinic RN  [facility]

## 2024-12-23 ENCOUNTER — LAB (OUTPATIENT)
Dept: LAB | Facility: CLINIC | Age: 34
End: 2024-12-23
Payer: COMMERCIAL

## 2024-12-23 DIAGNOSIS — Z11.59 NEED FOR HEPATITIS C SCREENING TEST: ICD-10-CM

## 2024-12-23 DIAGNOSIS — Z11.4 SCREENING FOR HIV (HUMAN IMMUNODEFICIENCY VIRUS): Primary | ICD-10-CM

## 2025-01-02 ENCOUNTER — LAB (OUTPATIENT)
Dept: LAB | Facility: CLINIC | Age: 35
End: 2025-01-02
Payer: COMMERCIAL

## 2025-01-02 ENCOUNTER — ALLIED HEALTH/NURSE VISIT (OUTPATIENT)
Dept: PEDIATRICS | Facility: CLINIC | Age: 35
End: 2025-01-02
Payer: COMMERCIAL

## 2025-01-02 VITALS — SYSTOLIC BLOOD PRESSURE: 149 MMHG | HEART RATE: 85 BPM | DIASTOLIC BLOOD PRESSURE: 94 MMHG | OXYGEN SATURATION: 95 %

## 2025-01-02 DIAGNOSIS — I10 HYPERTENSION GOAL BP (BLOOD PRESSURE) < 140/90: ICD-10-CM

## 2025-01-02 DIAGNOSIS — Z11.59 NEED FOR HEPATITIS C SCREENING TEST: ICD-10-CM

## 2025-01-02 DIAGNOSIS — Z01.30 BP CHECK: Primary | ICD-10-CM

## 2025-01-02 DIAGNOSIS — Z11.4 SCREENING FOR HIV (HUMAN IMMUNODEFICIENCY VIRUS): ICD-10-CM

## 2025-01-02 LAB
ANION GAP SERPL CALCULATED.3IONS-SCNC: 11 MMOL/L (ref 7–15)
BUN SERPL-MCNC: 15.9 MG/DL (ref 6–20)
CALCIUM SERPL-MCNC: 10 MG/DL (ref 8.8–10.4)
CHLORIDE SERPL-SCNC: 99 MMOL/L (ref 98–107)
CREAT SERPL-MCNC: 1 MG/DL (ref 0.67–1.17)
EGFRCR SERPLBLD CKD-EPI 2021: >90 ML/MIN/1.73M2
GLUCOSE SERPL-MCNC: 102 MG/DL (ref 70–99)
HCO3 SERPL-SCNC: 28 MMOL/L (ref 22–29)
HCV AB SERPL QL IA: NONREACTIVE
POTASSIUM SERPL-SCNC: 4.2 MMOL/L (ref 3.4–5.3)
SODIUM SERPL-SCNC: 138 MMOL/L (ref 135–145)

## 2025-01-02 RX ORDER — AMLODIPINE BESYLATE 5 MG/1
5 TABLET ORAL DAILY
Qty: 30 TABLET | Refills: 0 | Status: SHIPPED | OUTPATIENT
Start: 2025-01-02

## 2025-01-02 NOTE — PROGRESS NOTES
BP not at goal. Adding 5mg amlodipine daily (he's been on this previously). Please help schedule follow up OV with me in one month

## 2025-01-02 NOTE — PROGRESS NOTES
Cristiano Casanova is a 34 year old patient who comes in today for a Blood Pressure check.  Initial BP:  BP (!) 149/94 (BP Location: Right arm, Patient Position: Sitting, Cuff Size: Adult Large)   Pulse 85   SpO2 95%      Data Unavailable  Disposition: results routed to provider

## 2025-01-02 NOTE — PROGRESS NOTES
Pt has been scheduled for a Med Check w/ [facility] in February as requested.     Maria Elena Hammer   [facility]

## 2025-01-16 ENCOUNTER — TELEPHONE (OUTPATIENT)
Dept: FAMILY MEDICINE | Facility: CLINIC | Age: 35
End: 2025-01-16
Payer: COMMERCIAL

## 2025-01-16 NOTE — TELEPHONE ENCOUNTER
1st attempt- Left voicemail for the patient to call back and schedule the following:    Appointment type:  Testing only- No clinic visit  Provider:  Kiel Georges PA-C  Return date:  next available    Specialty phone number: [phone]

## 2025-02-13 ENCOUNTER — HOSPITAL ENCOUNTER (OUTPATIENT)
Dept: CARDIOLOGY | Facility: CLINIC | Age: 35
Discharge: HOME | End: 2025-02-13
Attending: PHYSICIAN ASSISTANT
Payer: COMMERCIAL

## 2025-02-13 DIAGNOSIS — I10 HYPERTENSION GOAL BP (BLOOD PRESSURE) < 140/90: ICD-10-CM

## 2025-02-13 LAB — LVEF ECHO: NORMAL

## 2025-02-13 PROCEDURE — 255N000002 HC RX 255 OP 636: Performed by: PHYSICIAN ASSISTANT

## 2025-02-13 PROCEDURE — 999N000208 ECHOCARDIOGRAM COMPLETE

## 2025-02-13 RX ADMIN — HUMAN ALBUMIN MICROSPHERES AND PERFLUTREN 6 ML: 10; .22 INJECTION, SOLUTION INTRAVENOUS at 09:15

## 2025-02-27 ENCOUNTER — OFFICE VISIT (OUTPATIENT)
Dept: FAMILY MEDICINE | Facility: CLINIC | Age: 35
End: 2025-02-27
Payer: COMMERCIAL

## 2025-02-27 VITALS
TEMPERATURE: 98 F | WEIGHT: 309 LBS | BODY MASS INDEX: 46.83 KG/M2 | HEART RATE: 89 BPM | RESPIRATION RATE: 16 BRPM | DIASTOLIC BLOOD PRESSURE: 97 MMHG | HEIGHT: 68 IN | OXYGEN SATURATION: 100 % | SYSTOLIC BLOOD PRESSURE: 154 MMHG

## 2025-02-27 DIAGNOSIS — I10 HYPERTENSION GOAL BP (BLOOD PRESSURE) < 140/90: Primary | ICD-10-CM

## 2025-02-27 DIAGNOSIS — I51.7 CONCENTRIC LEFT VENTRICULAR HYPERTROPHY: ICD-10-CM

## 2025-02-27 RX ORDER — AMLODIPINE BESYLATE 10 MG/1
10 TABLET ORAL DAILY
Qty: 90 TABLET | Refills: 0 | Status: SHIPPED | OUTPATIENT
Start: 2025-02-27

## 2025-02-27 ASSESSMENT — ANXIETY QUESTIONNAIRES
7. FEELING AFRAID AS IF SOMETHING AWFUL MIGHT HAPPEN: NOT AT ALL
6. BECOMING EASILY ANNOYED OR IRRITABLE: NOT AT ALL
GAD7 TOTAL SCORE: 0
IF YOU CHECKED OFF ANY PROBLEMS ON THIS QUESTIONNAIRE, HOW DIFFICULT HAVE THESE PROBLEMS MADE IT FOR YOU TO DO YOUR WORK, TAKE CARE OF THINGS AT HOME, OR GET ALONG WITH OTHER PEOPLE: NOT DIFFICULT AT ALL
1. FEELING NERVOUS, ANXIOUS, OR ON EDGE: NOT AT ALL
GAD7 TOTAL SCORE: 0
GAD7 TOTAL SCORE: 0
5. BEING SO RESTLESS THAT IT IS HARD TO SIT STILL: NOT AT ALL
3. WORRYING TOO MUCH ABOUT DIFFERENT THINGS: NOT AT ALL
7. FEELING AFRAID AS IF SOMETHING AWFUL MIGHT HAPPEN: NOT AT ALL
2. NOT BEING ABLE TO STOP OR CONTROL WORRYING: NOT AT ALL
8. IF YOU CHECKED OFF ANY PROBLEMS, HOW DIFFICULT HAVE THESE MADE IT FOR YOU TO DO YOUR WORK, TAKE CARE OF THINGS AT HOME, OR GET ALONG WITH OTHER PEOPLE?: NOT DIFFICULT AT ALL
4. TROUBLE RELAXING: NOT AT ALL

## 2025-02-27 ASSESSMENT — PAIN SCALES - GENERAL: PAINLEVEL_OUTOF10: NO PAIN (0)

## 2025-02-27 ASSESSMENT — PATIENT HEALTH QUESTIONNAIRE - PHQ9
SUM OF ALL RESPONSES TO PHQ QUESTIONS 1-9: 1
10. IF YOU CHECKED OFF ANY PROBLEMS, HOW DIFFICULT HAVE THESE PROBLEMS MADE IT FOR YOU TO DO YOUR WORK, TAKE CARE OF THINGS AT HOME, OR GET ALONG WITH OTHER PEOPLE: NOT DIFFICULT AT ALL
SUM OF ALL RESPONSES TO PHQ QUESTIONS 1-9: 1

## 2025-02-27 NOTE — PATIENT INSTRUCTIONS
[email or web address] -- look under the home cuff page     Lets check the BP again with a nurse visit in 2 weeks

## 2025-02-27 NOTE — PROGRESS NOTES
"  Assessment & Plan     Hypertension goal BP (blood pressure) < 140/90  Continued HTN; see below re echo findings. Likely some early diastolic abnormality as well. Discussed importance of control. We'll increase amlodipine and screen below. Follow up nurse check in 2 weeks  - REVIEW OF HEALTH MAINTENANCE PROTOCOL ORDERS  - Aldosterone; Future  - Renin activity; Future  - Aldosterone Renin Ratio; Future  - amLODIPine (NORVASC) 10 MG tablet; Take 1 tablet (10 mg) by mouth daily.  - Aldosterone Renin Ratio    Body mass index (BMI) 45.0-49.9, adult (H)  Did lose a significant amount with diet changes. Continue focus on portions and activity    Concentric left ventricular hypertrophy  Noted on echo. Consider BB down the road if needing to add    The longitudinal plan of care for the diagnosis(es)/condition(s) as documented were addressed during this visit. Due to the added complexity in care, I will continue to support Cristiano in the subsequent management and with ongoing continuity of care.      BMI  Estimated body mass index is 46.98 kg/m  as calculated from the following:    Height as of this encounter: 1.727 m (5' 8\").    Weight as of this encounter: 140.2 kg (309 lb).           Subjective   Cristiano is a 34 year old, presenting for the following health issues:  Medication Follow-up, MH Follow Up, Hypertension, and Results (Echo done 2/13/2025)        2/27/2025     2:14 PM   Additional Questions   Roomed by cecelia     History of Present Illness       Hypertension: He presents for follow up of hypertension.  He does not check blood pressure  regularly outside of the clinic. Outside blood pressures have been over 140/90. He does not follow a low salt diet.     He eats 2-3 servings of fruits and vegetables daily.He consumes 0 sweetened beverage(s) daily.He exercises with enough effort to increase his heart rate 20 to 29 minutes per day.  He exercises with enough effort to increase his heart rate 3 or less days per week. He is " "missing 2 dose(s) of medications per week.      Cristiano Casanova is a 34 year old male who presents today for medication/bp check  We restarted amlodipine 5mg in January after he did not have enough control   Denies any side effects   Denies any chest pains, shortness of breath or HA  Using CPAP regularly   Active at work but no formal exercise regimen    Still finding fluoxetine very helpful for anxiety  Dose is good          Review of Systems  Constitutional, HEENT, cardiovascular, pulmonary, gi and gu systems are negative, except as otherwise noted.      Objective    BP (!) 154/97   Pulse 89   Temp 98  F (36.7  C)   Resp 16   Ht 1.727 m (5' 8\")   Wt (!) 140.2 kg (309 lb)   SpO2 100%   BMI 46.98 kg/m    Body mass index is 46.98 kg/m .  Physical Exam   GENERAL: alert and no distress  RESP: lungs clear to auscultation - no rales, rhonchi or wheezes  CV: regular rate and rhythm, normal S1 S2, no S3 or S4, no murmur, click or rub, no peripheral edema  MS: No peripheral edema           Signed Electronically by: Kiel Georges PA-C    "

## 2025-03-01 LAB — RENIN PLAS-CCNC: 5.4 NG/ML/HR

## 2025-03-03 LAB — ALDOST/RENIN PLAS-RTO: 1.1 {RATIO} (ref 0–25)

## 2025-03-13 ENCOUNTER — MYC MEDICAL ADVICE (OUTPATIENT)
Dept: FAMILY MEDICINE | Facility: CLINIC | Age: 35
End: 2025-03-13
Payer: COMMERCIAL

## 2025-03-19 NOTE — TELEPHONE ENCOUNTER
Patient not reading [employer] messages. Can we call him and have him set up a BP check (at any clinic) nurse only visit?

## 2025-03-19 NOTE — TELEPHONE ENCOUNTER
Pt has been scheduled at the  location for his BP Check.     Maria Elena Hammer   [facility]     Left flank pain x 2 hours awoke pt from sleep- hx of kidney stones

## 2025-03-21 ENCOUNTER — NURSE TRIAGE (OUTPATIENT)
Dept: FAMILY MEDICINE | Facility: CLINIC | Age: 35
End: 2025-03-21

## 2025-03-21 NOTE — TELEPHONE ENCOUNTER
Please call Cristiano - the final BP of his checks today was actually within range. Lets try a recheck in 1 month. Please help him set up an appt in [place]

## 2025-03-21 NOTE — TELEPHONE ENCOUNTER
Cristiano Casanova is a 34 year old year old patient who comes in today for a Blood Pressure check because of ongoing blood pressure monitoring.  Vital Signs as repeated by /84  Patient is taking medication as prescribed  Patient is tolerating medications well.  Patient is monitoring Blood Pressure at home.    Current complaints: none  Disposition:  patient instructed to continue to monitor BP at home. Will route encounter to provider.     Pt in clinic for a BP check. When first checked BP was elevated at 174/92. Bp continues to lower after rechecks. When RN took manual BP was 132/84. Pt reports no missed doses of current medications. Pt denies sx.     Pt was advised to continue to monitor BP at home, and continue taking medications as prescribed. Pt reports getting nervous when coming to clinic. Please advise if pt to continue current medications.    Reason for Disposition   Systolic BP >= 130 OR Diastolic >= 80, and is taking BP medications    Additional Information   Negative: Sounds like a life-threatening emergency to the triager   Negative: Symptom is main concern (e.g., headache, chest pain)   Negative: Low blood pressure is main concern   Negative: Systolic BP >= 160 OR Diastolic >= 100, and any cardiac (e.g., breathing difficulty, chest pain) or neurologic symptoms (e.g., new-onset blurred or double vision)   Negative: Pregnant 20 or more weeks (or postpartum < 6 weeks) with new hand or face swelling   Negative: Pregnant 20 or more weeks (or postpartum < 6 weeks) and Systolic BP >= 160 OR Diastolic >= 110   Negative: Patient sounds very sick or weak to the triager   Negative: Systolic BP >= 200 OR Diastolic >= 120 and having NO cardiac or neurologic symptoms   Negative: Pregnant 20 or more weeks (or postpartum < 6 weeks) with Systolic BP >= 140 OR Diastolic >= 90   Negative: Systolic BP >= 160 OR Diastolic >= 100   Negative: Systolic BP >= 130 OR Diastolic >= 80, and pregnant   Negative: Systolic BP >= 180  "OR Diastolic >= 110   Negative: Patient wants to be seen   Negative: Ran out of BP medications   Negative: Taking BP medications and feels is having side effects (e.g., impotence, cough, dizziness)   Negative: Systolic BP >= 180 OR Diastolic >= 110, and missed most recent dose of blood pressure medication    Answer Assessment - Initial Assessment Questions  1. BLOOD PRESSURE: \"What is your blood pressure?\" \"Did you take at least two measurements 5 minutes apart?\"      174/92, 138/94    2. ONSET: \"When did you take your blood pressure?\"      In the clinic during BP check     3. HOW: \"How did you take your blood pressure?\" (e.g., automatic home BP monitor, visiting nurse)      In clinic manual cuff     4. HISTORY: \"Do you have a history of high blood pressure?\"      Yes    5. MEDICINES: \"Are you taking any medicines for blood pressure?\" \"Have you missed any doses recently?\"      Yes-no missed doses    6. OTHER SYMPTOMS: \"Do you have any symptoms?\" (e.g., blurred vision, chest pain, difficulty breathing, headache, weakness)      Denies any sx    7. PREGNANCY: \"Is there any chance you are pregnant?\" \"When was your last menstrual period?\"      NA    Protocols used: Blood Pressure - High-A-OH    "

## 2025-08-16 ENCOUNTER — HEALTH MAINTENANCE LETTER (OUTPATIENT)
Age: 35
End: 2025-08-16

## (undated) DEVICE — PAD FOAM RING KNEE 7209458

## (undated) DEVICE — GLOVE PROTEXIS BLUE W/NEU-THERA 8.0  2D73EB80

## (undated) DEVICE — MANIFOLD NEPTUNE 4 PORT 700-20

## (undated) DEVICE — DRSG GAUZE 4X4" 8044

## (undated) DEVICE — GLOVE PROTEXIS POWDER FREE SMT 8.0  2D72PT80X

## (undated) DEVICE — BAG CLEAR TRASH 1.3M 39X33" P4040C

## (undated) DEVICE — PACK ARTHROSCOPY KNEE

## (undated) DEVICE — LINEN FULL SHEET 5511

## (undated) DEVICE — DRSG ADAPTIC 3X8" 6113

## (undated) DEVICE — GOWN IMPERVIOUS SPECIALTY XLG/XLONG 32474

## (undated) DEVICE — CAST PADDING 6" STERILE 9046S

## (undated) DEVICE — NDL BLUNT 18GA 1" W/O FILTER 305181

## (undated) DEVICE — TUBING ARTHRO CONMED/LINVATEC PUMP BLUE INFLOW 10K100

## (undated) DEVICE — SYR 10ML SLIP TIP W/O NDL

## (undated) DEVICE — GLOVE PROTEXIS BLUE W/NEU-THERA 7.0  2D73EB70

## (undated) DEVICE — SU ETHILON 3-0 PS-2 18" 1669H

## (undated) DEVICE — GLOVE PROTEXIS POWDER FREE 7.0 ORTHOPEDIC 2D73ET70

## (undated) DEVICE — BLADE SHAVER ARTHRO 4.2MM CUDA C9254

## (undated) DEVICE — PREP CHLORAPREP 26ML TINTED ORANGE  260815

## (undated) DEVICE — LINEN DRAPE 54X72" 5467

## (undated) DEVICE — SOL NACL 0.9% IRRIG 3000ML BAG 2B7477

## (undated) DEVICE — LINEN HALF SHEET 5512

## (undated) DEVICE — GLOVE PROTEXIS POWDER FREE 7.5 ORTHOPEDIC 2D73ET75

## (undated) DEVICE — TOURNIQUET CUFF 34" STERILE

## (undated) RX ORDER — BUPIVACAINE HYDROCHLORIDE 2.5 MG/ML
INJECTION, SOLUTION EPIDURAL; INFILTRATION; INTRACAUDAL
Status: DISPENSED
Start: 2017-03-01

## (undated) RX ORDER — LIDOCAINE HYDROCHLORIDE 10 MG/ML
INJECTION, SOLUTION EPIDURAL; INFILTRATION; INTRACAUDAL; PERINEURAL
Status: DISPENSED
Start: 2017-03-01

## (undated) RX ORDER — CELECOXIB 200 MG/1
CAPSULE ORAL
Status: DISPENSED
Start: 2017-03-01

## (undated) RX ORDER — GLYCOPYRROLATE 0.2 MG/ML
INJECTION INTRAMUSCULAR; INTRAVENOUS
Status: DISPENSED
Start: 2017-03-01

## (undated) RX ORDER — PROPOFOL 10 MG/ML
INJECTION, EMULSION INTRAVENOUS
Status: DISPENSED
Start: 2017-03-01

## (undated) RX ORDER — HYDROCODONE BITARTRATE AND ACETAMINOPHEN 5; 325 MG/1; MG/1
TABLET ORAL
Status: DISPENSED
Start: 2017-03-01

## (undated) RX ORDER — DEXAMETHASONE SODIUM PHOSPHATE 4 MG/ML
INJECTION, SOLUTION INTRA-ARTICULAR; INTRALESIONAL; INTRAMUSCULAR; INTRAVENOUS; SOFT TISSUE
Status: DISPENSED
Start: 2017-03-01

## (undated) RX ORDER — ACETAMINOPHEN 325 MG/1
TABLET ORAL
Status: DISPENSED
Start: 2017-03-01

## (undated) RX ORDER — ONDANSETRON 2 MG/ML
INJECTION INTRAMUSCULAR; INTRAVENOUS
Status: DISPENSED
Start: 2017-03-01

## (undated) RX ORDER — CEFAZOLIN SODIUM 1 G/50ML
SOLUTION INTRAVENOUS
Status: DISPENSED
Start: 2017-03-01

## (undated) RX ORDER — MORPHINE SULFATE 4 MG/ML
INJECTION, SOLUTION INTRAMUSCULAR; INTRAVENOUS
Status: DISPENSED
Start: 2017-03-01